# Patient Record
Sex: FEMALE | Race: ASIAN | NOT HISPANIC OR LATINO | Employment: FULL TIME | URBAN - METROPOLITAN AREA
[De-identification: names, ages, dates, MRNs, and addresses within clinical notes are randomized per-mention and may not be internally consistent; named-entity substitution may affect disease eponyms.]

---

## 2017-01-30 ENCOUNTER — HOSPITAL ENCOUNTER (OUTPATIENT)
Dept: LAB | Facility: MEDICAL CENTER | Age: 42
End: 2017-01-30
Attending: FAMILY MEDICINE
Payer: COMMERCIAL

## 2017-01-30 LAB
CHOLEST SERPL-MCNC: 209 MG/DL (ref 100–199)
HDLC SERPL-MCNC: 27 MG/DL
LDLC SERPL CALC-MCNC: ABNORMAL MG/DL
TRIGL SERPL-MCNC: 421 MG/DL (ref 0–149)

## 2017-01-30 PROCEDURE — 36415 COLL VENOUS BLD VENIPUNCTURE: CPT

## 2017-01-30 PROCEDURE — 80061 LIPID PANEL: CPT

## 2017-05-15 ENCOUNTER — HOSPITAL ENCOUNTER (OUTPATIENT)
Dept: LAB | Facility: MEDICAL CENTER | Age: 42
End: 2017-05-15
Attending: FAMILY MEDICINE
Payer: COMMERCIAL

## 2017-05-15 LAB
CHOLEST SERPL-MCNC: 320 MG/DL (ref 100–199)
HDLC SERPL-MCNC: ABNORMAL MG/DL
LDLC SERPL CALC-MCNC: ABNORMAL MG/DL
TRIGL SERPL-MCNC: 1339 MG/DL (ref 0–149)

## 2017-05-15 PROCEDURE — 36415 COLL VENOUS BLD VENIPUNCTURE: CPT

## 2017-05-15 PROCEDURE — 80061 LIPID PANEL: CPT

## 2017-05-18 ENCOUNTER — HOSPITAL ENCOUNTER (OUTPATIENT)
Dept: LAB | Facility: MEDICAL CENTER | Age: 42
End: 2017-05-18
Attending: FAMILY MEDICINE
Payer: COMMERCIAL

## 2017-05-18 LAB
EST. AVERAGE GLUCOSE BLD GHB EST-MCNC: 151 MG/DL
GLUCOSE SERPL-MCNC: 162 MG/DL (ref 65–99)
HBA1C MFR BLD: 6.9 % (ref 0–5.6)
TSH SERPL DL<=0.005 MIU/L-ACNC: 1.51 UIU/ML (ref 0.3–3.7)

## 2017-05-18 PROCEDURE — 82947 ASSAY GLUCOSE BLOOD QUANT: CPT

## 2017-05-18 PROCEDURE — 36415 COLL VENOUS BLD VENIPUNCTURE: CPT

## 2017-05-18 PROCEDURE — 83036 HEMOGLOBIN GLYCOSYLATED A1C: CPT

## 2017-05-18 PROCEDURE — 84443 ASSAY THYROID STIM HORMONE: CPT

## 2017-06-14 ENCOUNTER — OFFICE VISIT (OUTPATIENT)
Dept: URGENT CARE | Facility: PHYSICIAN GROUP | Age: 42
End: 2017-06-14
Payer: COMMERCIAL

## 2017-06-14 VITALS
WEIGHT: 144 LBS | TEMPERATURE: 98.4 F | OXYGEN SATURATION: 96 % | HEART RATE: 89 BPM | SYSTOLIC BLOOD PRESSURE: 122 MMHG | RESPIRATION RATE: 16 BRPM | DIASTOLIC BLOOD PRESSURE: 84 MMHG | BODY MASS INDEX: 23.96 KG/M2

## 2017-06-14 DIAGNOSIS — Z72.0 TOBACCO USE: ICD-10-CM

## 2017-06-14 DIAGNOSIS — J20.9 ACUTE BRONCHITIS, UNSPECIFIED ORGANISM: ICD-10-CM

## 2017-06-14 PROCEDURE — 99214 OFFICE O/P EST MOD 30 MIN: CPT | Performed by: NURSE PRACTITIONER

## 2017-06-14 RX ORDER — AZITHROMYCIN 250 MG/1
TABLET, FILM COATED ORAL
Qty: 6 TAB | Refills: 0 | Status: SHIPPED | OUTPATIENT
Start: 2017-06-14 | End: 2017-11-28

## 2017-06-14 RX ORDER — PROMETHAZINE HYDROCHLORIDE AND CODEINE PHOSPHATE 6.25; 1 MG/5ML; MG/5ML
5-10 SYRUP ORAL
Qty: 120 ML | Refills: 0 | Status: SHIPPED | OUTPATIENT
Start: 2017-06-14 | End: 2017-11-28

## 2017-06-14 ASSESSMENT — ENCOUNTER SYMPTOMS
CHILLS: 1
COUGH: 1
SORE THROAT: 1
MYALGIAS: 1
RHINORRHEA: 1
FEVER: 0

## 2017-06-14 NOTE — Clinical Note
June 14, 2017        Ita ANDRADE Alex  [address] [place] [address]        Ms. Johnson was seen in our clinic today and she is excused from work for today and tomorrow. Thank you.  If you have any questions or concerns, please don't hesitate to call.        Sincerely,        KAI Cartwright.    Electronically Signed

## 2017-06-14 NOTE — MR AVS SNAPSHOT
Ita Johnson   2017 9:15 AM   Office Visit   MRN: [id number]    Department:  [facility]   Dept Phone:  [phone]    Description:  Female : 1975   Provider:  VALENTINE Cartwright           Reason for Visit     Cough x4 days. Cough, fever, body aches, lack of appetite      Allergies as of 2017     No Known Allergies      You were diagnosed with     Acute bronchitis, unspecified organism   [7204249]       Tobacco use   [[id number]]         Vital Signs     Blood Pressure Pulse Temperature Respirations Weight Oxygen Saturation    122/84 mmHg 89 36.9 °C (98.4 °F) 16 65.318 kg (144 lb) 96%    Last Menstrual Period Breastfeeding? Smoking Status             2017 No Current Every Day Smoker         Basic Information     Date Of Birth Sex Race Ethnicity Preferred Language    1975 Female  Non- English      Your appointments     Aug 21, 2017  8:20 AM   New Patient with Raquel Bolanos M.D.   [facility] (--)    [address]  [address]  [facility] [address] [phone]           Please bring Photo ID, Insurance Cards, All Medication Bottles and copies of any legal documents (such as Living Will, Power of ) If speaking a language besides English please bring an adult . Please arrive 30 minutes prior for check in and registration. You will be receiving a confirmation call a few days before your appointment from our automated call confirmation system.              Health Maintenance        Date Due Completion Dates    IMM DTaP/Tdap/Td Vaccine (1 - Tdap) 1995 ---    PAP SMEAR 1997 ---    MAMMOGRAM 2016 ---            Current Immunizations     No immunizations on file.      Below and/or attached are the medications your provider expects you to take. Review all of your home medications and newly ordered medications with your provider and/or pharmacist. Follow medication instructions as directed by your provider and/or  pharmacist. Please keep your medication list with you and share with your provider. Update the information when medications are discontinued, doses are changed, or new medications (including over-the-counter products) are added; and carry medication information at all times in the event of emergency situations     Allergies:  No Known Allergies          Medications  Valid as of: June 14, 2017 -  9:31 AM    Generic Name Brand Name Tablet Size Instructions for use    Azithromycin (Tab) ZITHROMAX 250 MG Take as directed        Azithromycin (Tab) ZITHROMAX 250 MG Take as directed        Guaifenesin-Codeine (Solution) ROBITUSSIN -10 mg/5mL Take 5 mL by mouth every four hours as needed for Cough.        Promethazine-Codeine (Syrup) PHENERGAN-CODEINE 6.25-10 MG/5ML Take 5-10 mL by mouth every bedtime.        .                 Medicines prescribed today were sent to:     [facility] [address] [place] [address], [place] - [address] [facility] [employer]    [address] [place] [address]    Phone: [phone] Fax: [phone]    Open 24 Hours?: No      Medication refill instructions:       If your prescription bottle indicates you have medication refills left, it is not necessary to call your provider’s office. Please contact your pharmacy and they will refill your medication.    If your prescription bottle indicates you do not have any refills left, you may request refills at any time through one of the following ways: The online [employer] system (except Urgent Care), by calling your provider’s office, or by asking your pharmacy to contact your provider’s office with a refill request. Medication refills are processed only during regular business hours and may not be available until the next business day. Your provider may request additional information or to have a follow-up visit with you prior to refilling your medication.   *Please Note: Medication refills are assigned a new Rx number when refilled  electronically. Your pharmacy may indicate that no refills were authorized even though a new prescription for the same medication is available at the pharmacy. Please request the medicine by name with the pharmacy before contacting your provider for a refill.           MyChart Access Code: Activation code not generated  Current MyChart Status: Active          Quit Tobacco Information     Do you want to quit using tobacco?    Quitting tobacco decreases risks of cancer, heart and lung disease, increases life expectancy, improves sense of taste and smell, and increases spending money, among other benefits.    If you are thinking about quitting, we can help.  • [facility] Quit Tobacco Program: [phone]  o Program occurs weekly for four weeks and includes pharmacist consultation on products to support quitting smoking or chewing tobacco. A provider referral is needed for pharmacist consultation.  • Tobacco Users Help Hotline: 4-833-QUIT-NOW (499-7689) or https://[place].quitlogix.org/  o Free, confidential telephone and online coaching for [place] residents. Sessions are designed on a schedule that is convenient for you. Eligible clients receive free nicotine replacement therapy.  • Nationally: www.smokefree.gov  o Information and professional assistance to support both immediate and long-term needs as you become, and remain, a non-smoker. Smokefree.gov allows you to choose the help that best fits your needs.

## 2017-06-14 NOTE — PROGRESS NOTES
Subjective:      Ita Johnson is a 41 y.o. female who presents with Cough            Cough  This is a new problem. The current episode started in the past 7 days. The problem has been unchanged. The cough is non-productive. Associated symptoms include chills, myalgias, nasal congestion, postnasal drip, rhinorrhea and a sore throat. Pertinent negatives include no fever. She has tried OTC cough suppressant for the symptoms. The treatment provided mild relief. There is no history of asthma.   Allergies, medications and history reviewed by me today  Current every day smoker.    Review of Systems   Constitutional: Positive for chills. Negative for fever.   HENT: Positive for postnasal drip, rhinorrhea and sore throat.    Respiratory: Positive for cough.    Musculoskeletal: Positive for myalgias.          Objective:     /84 mmHg  Pulse 89  Temp(Src) 36.9 °C (98.4 °F)  Resp 16  Wt 65.318 kg (144 lb)  SpO2 96%  LMP 05/14/2017  Breastfeeding? No     Physical Exam   Constitutional: She is oriented to person, place, and time. She appears well-developed and well-nourished. No distress.   HENT:   Head: Normocephalic and atraumatic.   Right Ear: External ear and ear canal normal. Tympanic membrane is not injected and not perforated. No middle ear effusion.   Left Ear: External ear and ear canal normal. Tympanic membrane is not injected and not perforated.  No middle ear effusion.   Nose: Mucosal edema present.   Mouth/Throat: Posterior oropharyngeal erythema present. No oropharyngeal exudate.   Eyes: Conjunctivae are normal. Right eye exhibits no discharge. Left eye exhibits no discharge.   Neck: Normal range of motion. Neck supple.   Cardiovascular: Normal rate, regular rhythm and normal heart sounds.    No murmur heard.  Pulmonary/Chest: Effort normal and breath sounds normal. No respiratory distress. She has no wheezes. She has no rales.   Musculoskeletal: Normal range of motion.   Normal movement of all 4  extremities.   Lymphadenopathy:     She has no cervical adenopathy.        Right: No supraclavicular adenopathy present.        Left: No supraclavicular adenopathy present.   Neurological: She is alert and oriented to person, place, and time. Gait normal.   Skin: Skin is warm and dry.   Psychiatric: She has a normal mood and affect. Her behavior is normal. Thought content normal.   Nursing note and vitals reviewed.              Assessment/Plan:     1. Acute bronchitis, unspecified organism  azithromycin (ZITHROMAX) 250 MG Tab    promethazine-codeine (PHENERGAN-CODEINE) 6.25-10 MG/5ML Syrup   2. Tobacco use       Patient is cautioned on sedation potential of narcotic medication; no drinking, driving or operating heavy machinery while on this medication.  [place]  reviewed, no risk factors or red flags.  Differential diagnosis, natural history, supportive care, and indications for immediate follow-up discussed at length.

## 2017-08-18 ENCOUNTER — HOSPITAL ENCOUNTER (OUTPATIENT)
Dept: LAB | Facility: MEDICAL CENTER | Age: 42
End: 2017-08-18
Attending: FAMILY MEDICINE
Payer: COMMERCIAL

## 2017-08-18 LAB
EST. AVERAGE GLUCOSE BLD GHB EST-MCNC: 128 MG/DL
HBA1C MFR BLD: 6.1 % (ref 0–5.6)

## 2017-08-18 PROCEDURE — 36415 COLL VENOUS BLD VENIPUNCTURE: CPT

## 2017-08-18 PROCEDURE — 80061 LIPID PANEL: CPT

## 2017-08-18 PROCEDURE — 83036 HEMOGLOBIN GLYCOSYLATED A1C: CPT

## 2017-08-19 LAB
CHOLEST SERPL-MCNC: 69 MG/DL (ref 100–199)
HDLC SERPL-MCNC: 29 MG/DL
LDLC SERPL CALC-MCNC: 7 MG/DL
TRIGL SERPL-MCNC: 167 MG/DL (ref 0–149)

## 2017-08-21 ENCOUNTER — OFFICE VISIT (OUTPATIENT)
Dept: MEDICAL GROUP | Facility: PHYSICIAN GROUP | Age: 42
End: 2017-08-21
Payer: COMMERCIAL

## 2017-08-21 VITALS
HEART RATE: 80 BPM | SYSTOLIC BLOOD PRESSURE: 126 MMHG | WEIGHT: 144 LBS | HEIGHT: 65 IN | TEMPERATURE: 98.3 F | BODY MASS INDEX: 23.99 KG/M2 | OXYGEN SATURATION: 97 % | RESPIRATION RATE: 16 BRPM | DIASTOLIC BLOOD PRESSURE: 86 MMHG

## 2017-08-21 DIAGNOSIS — E11.9 DIABETES MELLITUS WITHOUT COMPLICATION (HCC): ICD-10-CM

## 2017-08-21 DIAGNOSIS — E55.9 VITAMIN D DEFICIENCY: ICD-10-CM

## 2017-08-21 DIAGNOSIS — F17.200 TOBACCO DEPENDENCE: ICD-10-CM

## 2017-08-21 DIAGNOSIS — E78.5 DYSLIPIDEMIA: ICD-10-CM

## 2017-08-21 DIAGNOSIS — Z23 NEED FOR PNEUMOCOCCAL VACCINATION: ICD-10-CM

## 2017-08-21 DIAGNOSIS — I10 ESSENTIAL HYPERTENSION: ICD-10-CM

## 2017-08-21 PROCEDURE — 90471 IMMUNIZATION ADMIN: CPT | Performed by: FAMILY MEDICINE

## 2017-08-21 PROCEDURE — 99214 OFFICE O/P EST MOD 30 MIN: CPT | Mod: 25 | Performed by: FAMILY MEDICINE

## 2017-08-21 PROCEDURE — 90732 PPSV23 VACC 2 YRS+ SUBQ/IM: CPT | Performed by: FAMILY MEDICINE

## 2017-08-21 RX ORDER — FENOFIBRATE 145 MG/1
145 TABLET, COATED ORAL DAILY
Qty: 30 TAB | Refills: 5 | Status: SHIPPED | OUTPATIENT
Start: 2017-08-21 | End: 2018-02-22 | Stop reason: SDUPTHER

## 2017-08-21 RX ORDER — METFORMIN HYDROCHLORIDE 500 MG/1
500 TABLET, EXTENDED RELEASE ORAL DAILY
Qty: 30 TAB | Refills: 5 | Status: SHIPPED | OUTPATIENT
Start: 2017-08-21 | End: 2018-03-26 | Stop reason: SDUPTHER

## 2017-08-21 RX ORDER — ATORVASTATIN CALCIUM 20 MG/1
20 TABLET, FILM COATED ORAL NIGHTLY
COMMUNITY
End: 2017-11-28

## 2017-08-21 RX ORDER — LOSARTAN POTASSIUM AND HYDROCHLOROTHIAZIDE 12.5; 1 MG/1; MG/1
1 TABLET ORAL DAILY
Qty: 30 TAB | Refills: 5 | Status: SHIPPED | OUTPATIENT
Start: 2017-08-21 | End: 2018-02-22 | Stop reason: SDUPTHER

## 2017-08-21 RX ORDER — LOSARTAN POTASSIUM AND HYDROCHLOROTHIAZIDE 12.5; 1 MG/1; MG/1
1 TABLET ORAL DAILY
COMMUNITY
End: 2017-08-21 | Stop reason: SDUPTHER

## 2017-08-21 RX ORDER — METFORMIN HYDROCHLORIDE 500 MG/1
500 TABLET, EXTENDED RELEASE ORAL DAILY
COMMUNITY
End: 2017-08-21 | Stop reason: SDUPTHER

## 2017-08-21 ASSESSMENT — PATIENT HEALTH QUESTIONNAIRE - PHQ9: CLINICAL INTERPRETATION OF PHQ2 SCORE: 0

## 2017-08-21 ASSESSMENT — PAIN SCALES - GENERAL: PAINLEVEL: NO PAIN

## 2017-08-21 NOTE — MR AVS SNAPSHOT
"Ita Johnson   2017 8:20 AM   Office Visit   MRN: [id number]    Department:  [facility]   Dept Phone:  [phone]    Description:  Female : 1975   Provider:  Raquel Bolanos M.D.           Reason for Visit     Diabetes Found out 3 months ago from Dr. Cantrell, Blood sugar 165 this morning    Establish Care     Results Labs      Allergies as of 2017     No Known Allergies      You were diagnosed with     Diabetes mellitus without complication (CMS-[facility])   [265106]       Dyslipidemia   [583141]       Essential hypertension   [8678757]       Vitamin D deficiency   [5157839]       Need for pneumococcal vaccination   [193096]         Vital Signs     Blood Pressure Pulse Temperature Respirations Height Weight    126/86 mmHg 80 36.8 °C (98.3 °F) 16 1.651 m (5' 5\") 65.318 kg (144 lb)    Body Mass Index Oxygen Saturation Last Menstrual Period Breastfeeding? Smoking Status       23.96 kg/m2 97% 07/10/2017 No Current Every Day Smoker       Basic Information     Date Of Birth Sex Race Ethnicity Preferred Language    1975 Female  Non- English      Your appointments     Sep 25, 2017  9:20 AM   Established Patient with Raquel Bolanos M.D.   [facility] (--)    [address]  [address]  [facility] [id number] [phone]           You will be receiving a confirmation call a few days before your appointment from our automated call confirmation system.            2017  9:20 AM   Established Patient with Raquel Bolanos M.D.   [facility] (--)    [address]  [address]  [facility] [address] [phone]           You will be receiving a confirmation call a few days before your appointment from our automated call confirmation system.              Problem List              ICD-10-CM Priority Class Noted - Resolved    DM type 2 (diabetes mellitus, type 2) (CMS-[facility]) E11.9   Unknown - Present    Dyslipidemia E78.5   Unknown - Present    HTN (hypertension) " I10   Unknown - Present    Vitamin D deficiency E55.9   Unknown - Present      Health Maintenance        Date Due Completion Dates    PAP SMEAR 8/21/1997 ---    MAMMOGRAM 8/21/2016 ---    IMM INFLUENZA (1) 9/1/2017 ---    IMM DTaP/Tdap/Td Vaccine (1 - Tdap) 8/21/2018 (Originally 8/21/1995) ---            Current Immunizations     Pneumococcal polysaccharide vaccine (PPSV-23)  Incomplete      Below and/or attached are the medications your provider expects you to take. Review all of your home medications and newly ordered medications with your provider and/or pharmacist. Follow medication instructions as directed by your provider and/or pharmacist. Please keep your medication list with you and share with your provider. Update the information when medications are discontinued, doses are changed, or new medications (including over-the-counter products) are added; and carry medication information at all times in the event of emergency situations     Allergies:  No Known Allergies          Medications  Valid as of: August 21, 2017 -  9:08 AM    Generic Name Brand Name Tablet Size Instructions for use    Atorvastatin Calcium (Tab) LIPITOR 20 MG Take 20 mg by mouth every evening.        Azithromycin (Tab) ZITHROMAX 250 MG Take as directed        Azithromycin (Tab) ZITHROMAX 250 MG Take as directed        Fenofibrate (Tab) TRICOR 145 MG Take 1 Tab by mouth every day.        Guaifenesin-Codeine (Solution) ROBITUSSIN -10 mg/5mL Take 5 mL by mouth every four hours as needed for Cough.        Losartan Potassium-HCTZ (Tab) HYZAAR 100-12.5 MG Take 1 Tab by mouth every day.        MetFORMIN HCl (TABLET SR 24 HR) GLUCOPHAGE  MG Take 1 Tab by mouth every day.        Promethazine-Codeine (Syrup) PHENERGAN-CODEINE 6.25-10 MG/5ML Take 5-10 mL by mouth every bedtime.        .                 Medicines prescribed today were sent to:     [facility], [place] - [address] [facility]     [address] [place] [address] [address] [place] [address]    Phone: [phone] Fax: [phone]    Open 24 Hours?: No      Medication refill instructions:       If your prescription bottle indicates you have medication refills left, it is not necessary to call your provider’s office. Please contact your pharmacy and they will refill your medication.    If your prescription bottle indicates you do not have any refills left, you may request refills at any time through one of the following ways: The online [employer] system (except Urgent Care), by calling your provider’s office, or by asking your pharmacy to contact your provider’s office with a refill request. Medication refills are processed only during regular business hours and may not be available until the next business day. Your provider may request additional information or to have a follow-up visit with you prior to refilling your medication.   *Please Note: Medication refills are assigned a new Rx number when refilled electronically. Your pharmacy may indicate that no refills were authorized even though a new prescription for the same medication is available at the pharmacy. Please request the medicine by name with the pharmacy before contacting your provider for a refill.        Your To Do List     Future Labs/Procedures Complete By Expires    CBC WITH DIFFERENTIAL  As directed 8/22/2018    COMP METABOLIC PANEL  As directed 8/22/2018    HEMOGLOBIN A1C  As directed 8/22/2018    LIPID PROFILE  As directed 8/22/2018    MICROALBUMIN CREAT RATIO URINE  As directed 8/22/2018    VITAMIN D,25 HYDROXY  As directed 8/22/2018         [employer] Access Code: Activation code not generated  Current [employer] Status: Active          Quit Tobacco Information     Do you want to quit using tobacco?    Quitting tobacco decreases risks of cancer, heart and lung disease, increases life expectancy, improves sense of taste and smell, and increases spending money, among other benefits.    If you are thinking  about quitting, we can help.  • [facility] Quit Tobacco Program: 772-118-1015  o Program occurs weekly for four weeks and includes pharmacist consultation on products to support quitting smoking or chewing tobacco. A provider referral is needed for pharmacist consultation.  • Tobacco Users Help Hotline: 0-800-QUIT-NOW (188-1004) or https://[place].quitlogix.org/  o Free, confidential telephone and online coaching for [place] residents. Sessions are designed on a schedule that is convenient for you. Eligible clients receive free nicotine replacement therapy.  • Nationally: www.smokefree.gov  o Information and professional assistance to support both immediate and long-term needs as you become, and remain, a non-smoker. Smokefree.gov allows you to choose the help that best fits your needs.

## 2017-08-21 NOTE — Clinical Note
[facility]  Raquel Bolanos M.D.  [address]  [place] [address]  Fax: [phone]   Authorization for Release/Disclosure of   Protected Health Information   Name: ITA JOHNSON : 1975 SSN: [id number]   Address: [address] [place] [address] [place] [address] Phone:    [phone] (home)    I authorize the entity listed below to release/disclose the PHI below to:   [facility]/Raquel Bolanos M.D. and Raquel Bolanos M.D.   Provider or Entity Name:    [facility]   Address   City, State, [facility]   Phone:      Fax:     Reason for request: continuity of care   Information to be released:    [  ] LAST COLONOSCOPY,  including any PATH REPORT and follow-up  [  ] LAST FIT/COLOGUARD RESULT [  ] LAST DEXA  [  ] LAST MAMMOGRAM  [  ] LAST PAP  [  ] LAST LABS [x  ] RETINA EXAM REPORT  [  ] IMMUNIZATION RECORDS  [  ] Release all info      [  ] Check here and initial the line next to each item to release ALL health information INCLUDING  _____ Care and treatment for drug and / or alcohol abuse  _____ HIV testing, infection status, or AIDS  _____ Genetic Testing    DATES OF SERVICE OR TIME PERIOD TO BE DISCLOSED: _____________  I understand and acknowledge that:  * This Authorization may be revoked at any time by you in writing, except if your health information has already been used or disclosed.  * Your health information that will be used or disclosed as a result of you signing this authorization could be re-disclosed by the recipient. If this occurs, your re-disclosed health information may no longer be protected by State or Federal laws.  * You may refuse to sign this Authorization. Your refusal will not affect your ability to obtain treatment.  * This Authorization becomes effective upon signing and will  on (date) __________.      If no date is indicated, this Authorization will  one (1) year from the signature date.    Name: Ita Johnson    Signature:   Date:     2017       PLEASE FAX  REQUESTED RECORDS BACK TO: [phone]

## 2017-08-22 NOTE — PROGRESS NOTES
Subjective:      Ita Johnson is a 42 y.o. female who presents with Diabetes; Establish Care; and Results            Diabetes        This is a 42-year-old [place] female who is here as a new patient to get established. She was previously under the care of Dr. Cantrell.    She said she was recently diagnosed with diabetes mellitus type 2 3 months ago and was started on metformin  mg 4 tablets daily which she has been taking regularly. She denies any hypoglycemia. Blood work was done before his visit. She says she has been watchful of her diet. She said she just had a retinal exam at [facility] with normal exam.    She is on atorvastatin 20 mg daily which she takes for dyslipidemia. Follow-up lab work was done before this visit.     She has hypertension for which she takes losartan/HCTZ with good control of her blood pressure.    She has history of vitamin D deficiency which was very low in 2014 and 11. She said she does not take any vitamin D supplementation.    She smokes one pack per day for the last 26 years.       I reviewed the following    Past Medical History   Diagnosis Date   • DM type 2 (diabetes mellitus, type 2) (CMS-HCC)    • Dyslipidemia    • HTN (hypertension)    • Vitamin D deficiency         History reviewed. No pertinent past surgical history.    No Known Allergies    Current Outpatient Prescriptions   Medication Sig Dispense Refill   • atorvastatin (LIPITOR) 20 MG Tab Take 20 mg by mouth every evening.     • fenofibrate (TRICOR) 145 MG Tab Take 1 Tab by mouth every day. 30 Tab 5   • metformin ER (GLUCOPHAGE XR) 500 MG TABLET SR 24 HR Take 1 Tab by mouth every day. 30 Tab 5   • losartan-hydrochlorothiazide (HYZAAR) 100-12.5 MG per tablet Take 1 Tab by mouth every day. 30 Tab 5   • azithromycin (ZITHROMAX) 250 MG Tab Take as directed 6 Tab 0   • promethazine-codeine (PHENERGAN-CODEINE) 6.25-10 MG/5ML Syrup Take 5-10 mL by mouth every bedtime. 120 mL 0   • azithromycin (ZITHROMAX) 250 MG Tab  Take as directed 6 Tab 0   • guaifenesin-codeine (CHERATUSSIN AC) Solution Take 5 mL by mouth every four hours as needed for Cough. 120 mL 0     No current facility-administered medications for this visit.        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   HEENT: Negative for ear pain, nosebleeds, congestion, sore throat and neck pain.    Eyes: Negative for blurred vision.   Respiratory: Negative for cough, sputum production, shortness of breath and wheezing.    Cardiovascular: Negative for chest pain, palpitations, orthopnea and leg swelling.   Gastrointestinal: Negative for heartburn, nausea, vomiting and abdominal pain.   Genitourinary: Negative for dysuria, urgency and frequency.   Musculoskeletal: Negative for myalgias, back pain and joint pain.   Skin: Negative for rash and itching.   Neurological: Negative for dizziness, tingling, tremors, sensory change, focal weakness and  "headaches.   Endo/Heme/Allergies: Does not bruise/bleed easily.   Psychiatric/Behavioral: Negative for depression, anxiety, or memory loss.     All other systems reviewed and are negative except as in HPI.       Objective:     /86 mmHg  Pulse 80  Temp(Src) 36.8 °C (98.3 °F)  Resp 16  Ht 1.651 m (5' 5\")  Wt 65.318 kg (144 lb)  BMI 23.96 kg/m2  SpO2 97%  LMP 07/10/2017  Breastfeeding? No     Physical Exam   Constitutional: She is oriented to person, place, and time. She appears well-developed and well-nourished. No distress.   HENT:   Head: Normocephalic and atraumatic.   Right Ear: External ear normal.   Left Ear: External ear normal.   Nose: Nose normal.   Mouth/Throat: Oropharynx is clear and moist. No oropharyngeal exudate.   Eyes: Conjunctivae and EOM are normal. Pupils are equal, round, and reactive to light. Right eye exhibits no discharge. Left eye exhibits no discharge. No scleral icterus.   Neck: Normal range of motion. Neck supple. No JVD present. No tracheal deviation present. No thyromegaly present.   Cardiovascular: Normal rate, regular rhythm, normal heart sounds and intact distal pulses.  Exam reveals no gallop and no friction rub.    No murmur heard.  Pulmonary/Chest: Effort normal and breath sounds normal. No stridor. No respiratory distress. She has no wheezes. She has no rales. She exhibits no tenderness.   Abdominal: Soft. Bowel sounds are normal. She exhibits no distension and no mass. There is no tenderness. There is no rebound and no guarding. No hernia.   Musculoskeletal: Normal range of motion. She exhibits no edema, tenderness or deformity.   Lymphadenopathy:     She has no cervical adenopathy.   Neurological: She is alert and oriented to person, place, and time. She has normal reflexes. She displays normal reflexes. No cranial nerve deficit. She exhibits normal muscle tone. Coordination normal.   Skin: Skin is warm and dry. No rash noted. She is not diaphoretic. No erythema. " No pallor.   Psychiatric: She has a normal mood and affect. Her behavior is normal. Judgment and thought content normal.   Nursing note and vitals reviewed.     ]Monofilament testing with a 10 gram force: sensation intact: intact bilaterally  Visual Inspection: Feet without maceration, ulcers, fissures.  Pedal pulses: intact bilaterally    Hospital Outpatient Visit on 08/18/2017   Component Date Value   • Cholesterol,Tot 08/18/2017 69*previously 320    • Triglycerides 08/18/2017 167*previously 1339    • HDL 08/18/2017 29*   • LDL 08/18/2017 7    • Glycohemoglobin 08/18/2017 6.1*previously 6.9    • Est Avg Glucose 08/18/2017 128              Assessment/Plan:     1. Diabetes mellitus without complication (CMS-[facility])  To start with her hemoglobin A1c when she was diagnosed with diabetes was 6.9. Hemoglobin A1c dropped to 6.1 on metformin XR total dose of 2000 mg daily. I don't think she needs this much of a dose. I will decrease the dose to 500 mg at night. With her diet, exercise and weight loss I think she can get this diabetes controlled even without the need for medication. I will reevaluate in 3 months. We will include urine micro-albumin with the next blood work.  - LIPID PROFILE; Future  - COMP METABOLIC PANEL; Future  - HEMOGLOBIN A1C; Future  - VITAMIN D,25 HYDROXY; Future  - MICROALBUMIN CREAT RATIO URINE; Future  - CBC WITH DIFFERENTIAL; Future  - metformin ER (GLUCOPHAGE XR) 500 MG TABLET SR 24 HR; Take 1 Tab by mouth every day.  Dispense: 30 Tab; Refill: 5    2. Dyslipidemia  Her total cholesterol dropped to very low level of 69 with LDL very low at 7. Triglycerides significantly improved from 1339 to 167. I will discontinue atorvastatin. I will put her on TriCor to managed dyslipidemia. If the LDL cholesterol increases to 70 or higher then we will restart atorvastatin.  - fenofibrate (TRICOR) 145 MG Tab; Take 1 Tab by mouth every day.  Dispense: 30 Tab; Refill: 5  - LIPID PROFILE; Future  - COMP METABOLIC  PANEL; Future  - HEMOGLOBIN A1C; Future  - VITAMIN D,25 HYDROXY; Future  - MICROALBUMIN CREAT RATIO URINE; Future  - CBC WITH DIFFERENTIAL; Future    3. Essential hypertension  Blood pressure under control and losartan/HCTZ.  - LIPID PROFILE; Future  - COMP METABOLIC PANEL; Future  - HEMOGLOBIN A1C; Future  - VITAMIN D,25 HYDROXY; Future  - MICROALBUMIN CREAT RATIO URINE; Future  - CBC WITH DIFFERENTIAL; Future  - losartan-hydrochlorothiazide (HYZAAR) 100-12.5 MG per tablet; Take 1 Tab by mouth every day.  Dispense: 30 Tab; Refill: 5    4. Vitamin D deficiency  I will check vitamin D level. We will treat depending on the level.  - LIPID PROFILE; Future  - COMP METABOLIC PANEL; Future  - HEMOGLOBIN A1C; Future  - VITAMIN D,25 HYDROXY; Future  - MICROALBUMIN CREAT RATIO URINE; Future  - CBC WITH DIFFERENTIAL; Future    5. Need for pneumococcal vaccination  We gave her Pneumovax this visit. She will need flu shot in the fall.  - PNEUMOCOCCAL POLYSACCHARIDE VACCINE 23-VALENT =>1YO SQ/IM    6. Tobacco dependence  Discussed complete cessation of cigarettes. Discussed her increased risk for heart disease with her multiple risk factors including diabetes, hypertension, dyslipidemia and cigarette smoking. She will work hard in quitting cold turkey. I will reevaluate next visit.      Please note that this dictation was created using voice recognition software. I have worked with consultants from the vendor as well as technical experts from [employer] to optimize the interface. I have made every reasonable attempt to correct obvious errors, but I expect that there are errors of grammar and possibly content I did not discover before finalizing the note.

## 2017-09-25 ENCOUNTER — HOSPITAL ENCOUNTER (OUTPATIENT)
Facility: MEDICAL CENTER | Age: 42
End: 2017-09-25
Attending: FAMILY MEDICINE
Payer: COMMERCIAL

## 2017-09-25 ENCOUNTER — OFFICE VISIT (OUTPATIENT)
Dept: MEDICAL GROUP | Facility: PHYSICIAN GROUP | Age: 42
End: 2017-09-25
Payer: COMMERCIAL

## 2017-09-25 VITALS
WEIGHT: 145.5 LBS | DIASTOLIC BLOOD PRESSURE: 90 MMHG | RESPIRATION RATE: 16 BRPM | HEART RATE: 91 BPM | OXYGEN SATURATION: 100 % | TEMPERATURE: 98.4 F | BODY MASS INDEX: 24.24 KG/M2 | SYSTOLIC BLOOD PRESSURE: 120 MMHG | HEIGHT: 65 IN

## 2017-09-25 DIAGNOSIS — Z01.419 ENCOUNTER FOR ROUTINE GYNECOLOGICAL EXAMINATION WITH PAPANICOLAOU SMEAR OF CERVIX: ICD-10-CM

## 2017-09-25 DIAGNOSIS — Z11.51 SCREENING FOR HUMAN PAPILLOMAVIRUS (HPV): ICD-10-CM

## 2017-09-25 DIAGNOSIS — Z23 NEED FOR IMMUNIZATION AGAINST INFLUENZA: ICD-10-CM

## 2017-09-25 PROCEDURE — 88175 CYTOPATH C/V AUTO FLUID REDO: CPT

## 2017-09-25 PROCEDURE — 99396 PREV VISIT EST AGE 40-64: CPT | Mod: 25 | Performed by: FAMILY MEDICINE

## 2017-09-25 PROCEDURE — 90686 IIV4 VACC NO PRSV 0.5 ML IM: CPT | Performed by: FAMILY MEDICINE

## 2017-09-25 PROCEDURE — 87624 HPV HI-RISK TYP POOLED RSLT: CPT

## 2017-09-25 PROCEDURE — 99000 SPECIMEN HANDLING OFFICE-LAB: CPT | Performed by: FAMILY MEDICINE

## 2017-09-25 PROCEDURE — 90471 IMMUNIZATION ADMIN: CPT | Performed by: FAMILY MEDICINE

## 2017-09-25 NOTE — LETTER
[facility]  Raquel Bolanos M.D.  [address]  [place] [address]  Fax: [phone]   Authorization for Release/Disclosure of   Protected Health Information   Name: ITACOMPA JOHNSON : 1975 SSN: [id number]   Address: [address] [place] [address] [place] [address] Phone:    [phone] (home)    I authorize the entity listed below to release/disclose the PHI below to:   [facility]/Raquel Bolanos M.D. and Raquel Bolanos M.D.   Provider or Entity Name:  [facility]   Address   [facility], [facility], [facility]   Phone:  [phone]    Fax:  [phone]   Reason for request: continuity of care   Information to be released:    [  ] LAST COLONOSCOPY,  including any PATH REPORT and follow-up  [  ] LAST FIT/COLOGUARD RESULT [  ] LAST DEXA  [ x ] LAST MAMMOGRAM  [  ] LAST PAP  [  ] LAST LABS [  ] RETINA EXAM REPORT  [  ] IMMUNIZATION RECORDS  [  ] Release all info      [  ] Check here and initial the line next to each item to release ALL health information INCLUDING  _____ Care and treatment for drug and / or alcohol abuse  _____ HIV testing, infection status, or AIDS  _____ Genetic Testing    DATES OF SERVICE OR TIME PERIOD TO BE DISCLOSED: _____________  I understand and acknowledge that:  * This Authorization may be revoked at any time by you in writing, except if your health information has already been used or disclosed.  * Your health information that will be used or disclosed as a result of you signing this authorization could be re-disclosed by the recipient. If this occurs, your re-disclosed health information may no longer be protected by State or Federal laws.  * You may refuse to sign this Authorization. Your refusal will not affect your ability to obtain treatment.  * This Authorization becomes effective upon signing and will  on (date) __________.      If no date is indicated, this Authorization will  one (1) year from the signature date.    Name: Itacompa Johnson    Signature:   Date:     2017       PLEASE  FAX REQUESTED RECORDS BACK TO: [phone]

## 2017-09-26 LAB
CYTOLOGY REG CYTOL: NORMAL
HPV HR 12 DNA CVX QL NAA+PROBE: NEGATIVE
HPV16 DNA SPEC QL NAA+PROBE: NEGATIVE
HPV18 DNA SPEC QL NAA+PROBE: NEGATIVE
SPECIMEN SOURCE: NORMAL

## 2017-09-26 NOTE — PROGRESS NOTES
Subjective:      Ita Johnson is a 42 y.o. female who presents with Gynecologic Exam            HPI     Patient is here for routine GYN exam/Pap smear. Her last Pap smear was 2 years ago done by previous physicians Dr. Cantrell. No history of abnormal Pap smear. No history of STDs. LMP 8/29/17. She had Pneumovax in 8/17. She just had a mammogram this month at [facility]. She got the letter that says it was normal. We don't have the official report.    I reviewed the following    Past Medical History:   Diagnosis Date   • DM type 2 (diabetes mellitus, type 2) (CMS-[facility])    • Dyslipidemia    • HTN (hypertension)    • Vitamin D deficiency         No past surgical history on file.    No Known Allergies    Current Outpatient Prescriptions   Medication Sig Dispense Refill   • fenofibrate (TRICOR) 145 MG Tab Take 1 Tab by mouth every day. 30 Tab 5   • metformin ER (GLUCOPHAGE XR) 500 MG TABLET SR 24 HR Take 1 Tab by mouth every day. 30 Tab 5   • losartan-hydrochlorothiazide (HYZAAR) 100-12.5 MG per tablet Take 1 Tab by mouth every day. 30 Tab 5   • atorvastatin (LIPITOR) 20 MG Tab Take 20 mg by mouth every evening.     • azithromycin (ZITHROMAX) 250 MG Tab Take as directed 6 Tab 0   • promethazine-codeine (PHENERGAN-CODEINE) 6.25-10 MG/5ML Syrup Take 5-10 mL by mouth every bedtime. 120 mL 0   • azithromycin (ZITHROMAX) 250 MG Tab Take as directed 6 Tab 0   • guaifenesin-codeine (CHERATUSSIN AC) Solution Take 5 mL by mouth every four hours as needed for Cough. 120 mL 0     No current facility-administered medications for this visit.         Family History   Problem Relation Age of Onset   • Stroke Mother    • Hypertension Mother    • Diabetes Father    • Hypertension Father    • Prostate cancer Father      with mets   • Hyperlipidemia Father    • Heart Failure Father    • Hypertension Sister    • Diabetes Sister    • Hyperlipidemia Sister    • Arthritis Sister      autoimmune   • Diabetes Brother    • Hypertension  "Brother        Social History     Social History   • Marital status:      Spouse name: N/A   • Number of children: 0   • Years of education: N/A     Occupational History   • Executive Host - [employer]       Social History Main Topics   • Smoking status: Current Every Day Smoker     Packs/day: 1.00     Years: 26.00     Types: Cigarettes   • Smokeless tobacco: Never Used   • Alcohol use 0.0 oz/week      Comment: occasional   • Drug use: No   • Sexual activity: Yes     Partners: Male     Other Topics Concern   • Not on file     Social History Narrative   • No narrative on file            ROS     Review of systems as per history of present illness, the rest are negative.       Objective:     /90   Pulse 91   Temp 36.9 °C (98.4 °F)   Resp 16   Ht 1.651 m (5' 5\")   Wt 66 kg (145 lb 8.1 oz)   LMP 09/03/2017   SpO2 100%   BMI 24.21 kg/m²      Physical Exam   Constitutional: She is oriented to person, place, and time. She appears well-developed and well-nourished. No distress.   HENT:   Head: Normocephalic and atraumatic.   Right Ear: External ear normal.   Left Ear: External ear normal.   Nose: Nose normal.   Mouth/Throat: Oropharynx is clear and moist. No oropharyngeal exudate.   Eyes: Conjunctivae and EOM are normal. Pupils are equal, round, and reactive to light. Right eye exhibits no discharge. Left eye exhibits no discharge. No scleral icterus.   Neck: Normal range of motion. Neck supple. No tracheal deviation present. No thyromegaly present.   Cardiovascular: Normal rate, regular rhythm and normal heart sounds.  Exam reveals no gallop.    No murmur heard.  Pulmonary/Chest: Effort normal and breath sounds normal. No stridor. No respiratory distress. She has no wheezes. She has no rales. Right breast exhibits no inverted nipple, no mass, no nipple discharge, no skin change and no tenderness. Left breast exhibits no inverted nipple, no mass, no nipple discharge, no skin change and no tenderness. " Breasts are symmetrical.   Abdominal: Soft. Bowel sounds are normal. She exhibits no distension and no mass. There is no tenderness. There is no rebound and no guarding. Hernia confirmed negative in the right inguinal area and confirmed negative in the left inguinal area.   Genitourinary: Vagina normal and uterus normal. No breast tenderness, discharge or bleeding. Pelvic exam was performed with patient supine. No labial fusion. There is no rash, tenderness, lesion or injury on the right labia. There is no rash, tenderness, lesion or injury on the left labia. Uterus is not deviated, not enlarged, not fixed and not tender. Cervix exhibits no motion tenderness, no discharge and no friability. Right adnexum displays no mass, no tenderness and no fullness. Left adnexum displays no mass, no tenderness and no fullness. No erythema, tenderness or bleeding in the vagina. No foreign body in the vagina. No signs of injury around the vagina. No vaginal discharge found.   Musculoskeletal: Normal range of motion. She exhibits no edema or tenderness.   Lymphadenopathy:     She has no cervical adenopathy.        Right: No inguinal adenopathy present.        Left: No inguinal adenopathy present.   Neurological: She is alert and oriented to person, place, and time. She displays normal reflexes. No cranial nerve deficit. She exhibits normal muscle tone. Coordination normal.   Skin: Skin is warm. No rash noted. She is not diaphoretic. No erythema. No pallor.   Psychiatric: She has a normal mood and affect. Her behavior is normal. Thought content normal.                    Assessment/Plan:     1. Encounter for routine gynecological examination with Papanicolaou smear of cervix  Pap smear was done. Complete physical exam was done. Specimen was packaged and sent to the lab. We will get official report of the mammogram that was done at [facility].  - THINPREP PAP WITH HPV; Future    2. Screening for human papillomavirus  (HPV)  Screening for HPV was done.  - THINPREP PAP WITH HPV; Future    3. Need for immunization against influenza  Flu shot was given.  - Flu Quad Inj >3 Year Pre-Filled PF    She will return for follow-up visit in November.      Please note that this dictation was created using voice recognition software. I have worked with consultants from the vendor as well as technical experts from [facility] to optimize the interface. I have made every reasonable attempt to correct obvious errors, but I expect that there are errors of grammar and possibly content I did not discover before finalizing the note.

## 2017-11-20 ENCOUNTER — HOSPITAL ENCOUNTER (OUTPATIENT)
Dept: LAB | Facility: MEDICAL CENTER | Age: 42
End: 2017-11-20
Attending: FAMILY MEDICINE
Payer: COMMERCIAL

## 2017-11-20 DIAGNOSIS — E55.9 VITAMIN D DEFICIENCY: ICD-10-CM

## 2017-11-20 DIAGNOSIS — E11.9 DIABETES MELLITUS WITHOUT COMPLICATION (HCC): ICD-10-CM

## 2017-11-20 DIAGNOSIS — I10 ESSENTIAL HYPERTENSION: ICD-10-CM

## 2017-11-20 DIAGNOSIS — E78.5 DYSLIPIDEMIA: ICD-10-CM

## 2017-11-20 LAB
25(OH)D3 SERPL-MCNC: 21 NG/ML (ref 30–100)
ALBUMIN SERPL BCP-MCNC: 4.1 G/DL (ref 3.2–4.9)
ALBUMIN/GLOB SERPL: 1.2 G/DL
ALP SERPL-CCNC: 38 U/L (ref 30–99)
ALT SERPL-CCNC: 24 U/L (ref 2–50)
ANION GAP SERPL CALC-SCNC: 10 MMOL/L (ref 0–11.9)
AST SERPL-CCNC: 21 U/L (ref 12–45)
BASOPHILS # BLD AUTO: 0.5 % (ref 0–1.8)
BASOPHILS # BLD: 0.04 K/UL (ref 0–0.12)
BILIRUB SERPL-MCNC: 0.3 MG/DL (ref 0.1–1.5)
BUN SERPL-MCNC: 20 MG/DL (ref 8–22)
CALCIUM SERPL-MCNC: 9.1 MG/DL (ref 8.5–10.5)
CHLORIDE SERPL-SCNC: 101 MMOL/L (ref 96–112)
CHOLEST SERPL-MCNC: 132 MG/DL (ref 100–199)
CO2 SERPL-SCNC: 25 MMOL/L (ref 20–33)
CREAT SERPL-MCNC: 0.82 MG/DL (ref 0.5–1.4)
CREAT UR-MCNC: 145.4 MG/DL
EOSINOPHIL # BLD AUTO: 0.28 K/UL (ref 0–0.51)
EOSINOPHIL NFR BLD: 3.7 % (ref 0–6.9)
ERYTHROCYTE [DISTWIDTH] IN BLOOD BY AUTOMATED COUNT: 48.8 FL (ref 35.9–50)
EST. AVERAGE GLUCOSE BLD GHB EST-MCNC: 137 MG/DL
GFR SERPL CREATININE-BSD FRML MDRD: >60 ML/MIN/1.73 M 2
GLOBULIN SER CALC-MCNC: 3.5 G/DL (ref 1.9–3.5)
GLUCOSE SERPL-MCNC: 107 MG/DL (ref 65–99)
HBA1C MFR BLD: 6.4 % (ref 0–5.6)
HCT VFR BLD AUTO: 44.5 % (ref 37–47)
HDLC SERPL-MCNC: 24 MG/DL
HGB BLD-MCNC: 14.4 G/DL (ref 12–16)
IMM GRANULOCYTES # BLD AUTO: 0.03 K/UL (ref 0–0.11)
IMM GRANULOCYTES NFR BLD AUTO: 0.4 % (ref 0–0.9)
LDLC SERPL CALC-MCNC: 52 MG/DL
LYMPHOCYTES # BLD AUTO: 3.85 K/UL (ref 1–4.8)
LYMPHOCYTES NFR BLD: 50.5 % (ref 22–41)
MCH RBC QN AUTO: 29.3 PG (ref 27–33)
MCHC RBC AUTO-ENTMCNC: 32.4 G/DL (ref 33.6–35)
MCV RBC AUTO: 90.4 FL (ref 81.4–97.8)
MICROALBUMIN UR-MCNC: 0.8 MG/DL
MICROALBUMIN/CREAT UR: 6 MG/G (ref 0–30)
MONOCYTES # BLD AUTO: 0.53 K/UL (ref 0–0.85)
MONOCYTES NFR BLD AUTO: 7 % (ref 0–13.4)
NEUTROPHILS # BLD AUTO: 2.89 K/UL (ref 2–7.15)
NEUTROPHILS NFR BLD: 37.9 % (ref 44–72)
NRBC # BLD AUTO: 0 K/UL
NRBC BLD AUTO-RTO: 0 /100 WBC
PLATELET # BLD AUTO: 417 K/UL (ref 164–446)
PMV BLD AUTO: 8.9 FL (ref 9–12.9)
POTASSIUM SERPL-SCNC: 3.6 MMOL/L (ref 3.6–5.5)
PROT SERPL-MCNC: 7.6 G/DL (ref 6–8.2)
RBC # BLD AUTO: 4.92 M/UL (ref 4.2–5.4)
SODIUM SERPL-SCNC: 136 MMOL/L (ref 135–145)
TRIGL SERPL-MCNC: 281 MG/DL (ref 0–149)
WBC # BLD AUTO: 7.6 K/UL (ref 4.8–10.8)

## 2017-11-20 PROCEDURE — 82043 UR ALBUMIN QUANTITATIVE: CPT

## 2017-11-20 PROCEDURE — 85025 COMPLETE CBC W/AUTO DIFF WBC: CPT

## 2017-11-20 PROCEDURE — 82306 VITAMIN D 25 HYDROXY: CPT

## 2017-11-20 PROCEDURE — 83036 HEMOGLOBIN GLYCOSYLATED A1C: CPT

## 2017-11-20 PROCEDURE — 80053 COMPREHEN METABOLIC PANEL: CPT

## 2017-11-20 PROCEDURE — 82570 ASSAY OF URINE CREATININE: CPT

## 2017-11-20 PROCEDURE — 36415 COLL VENOUS BLD VENIPUNCTURE: CPT

## 2017-11-20 PROCEDURE — 80061 LIPID PANEL: CPT

## 2017-11-27 ENCOUNTER — OFFICE VISIT (OUTPATIENT)
Dept: MEDICAL GROUP | Facility: PHYSICIAN GROUP | Age: 42
End: 2017-11-27
Payer: COMMERCIAL

## 2017-11-27 VITALS
HEART RATE: 79 BPM | OXYGEN SATURATION: 98 % | DIASTOLIC BLOOD PRESSURE: 80 MMHG | TEMPERATURE: 98.1 F | WEIGHT: 150.13 LBS | BODY MASS INDEX: 25.01 KG/M2 | HEIGHT: 65 IN | SYSTOLIC BLOOD PRESSURE: 130 MMHG

## 2017-11-27 DIAGNOSIS — E55.9 VITAMIN D DEFICIENCY: ICD-10-CM

## 2017-11-27 DIAGNOSIS — E78.5 DYSLIPIDEMIA: ICD-10-CM

## 2017-11-27 DIAGNOSIS — E11.9 DIABETES MELLITUS WITHOUT COMPLICATION (HCC): ICD-10-CM

## 2017-11-27 DIAGNOSIS — I10 ESSENTIAL HYPERTENSION: ICD-10-CM

## 2017-11-27 PROCEDURE — 99214 OFFICE O/P EST MOD 30 MIN: CPT | Performed by: FAMILY MEDICINE

## 2017-11-28 NOTE — PROGRESS NOTES
"Subjective:      Ita Mays is a 42 y.o. female who presents with Follow-Up (arms numbness and ibis)            HPI     Patient returns for follow-up of her medical problems.    In terms of her diabetes mellitus type 2 she is taking metformin  mg one tablet daily only with dinner. Blood work was done before this visit.    For her dyslipidemia, we discontinued atorvastatin because her LDL cholesterol was already very low at single digit at 7. She is only on fenofibrate. Blood work was done before his visit.    She continues to take losartan/hydrochlorothiazide for hypertension with good control of her blood pressure.    For her vitamin D deficiency, she takes vitamin D supplementation 1000 international units daily.    Past medical history, past surgical history, family history reviewed-no changes    Social history reviewed-no changes    Allergies reviewed-no changes    Medications reviewed-no changes    ROS     Review of systems as per history of present illness, the rest are negative.       Objective:     /80   Pulse 79   Temp 36.7 °C (98.1 °F)   Ht 1.651 m (5' 5\")   Wt 68.1 kg (150 lb 2.1 oz)   SpO2 98%   BMI 24.98 kg/m²      Physical Exam      Examined alert, awake, oriented, not in distress    Neck-supple, no lymphadenopathy, no thyromegaly  Lungs-clear to auscultation, no rales, no wheezes  Heart-regular rate and rhythm, no murmur  Extremities-no edema, clubbing, cyanosis  Monofilament testing with a 10 gram force: sensation intact: intact bilaterally  Visual Inspection: Feet without maceration, ulcers, fissures.  Pedal pulses: intact bilaterally        Results for ITA MAYS (MRN [id number]) as of 11/28/2017 06:38   Ref. Range 8/18/2017 07:07 11/20/2017 07:27 11/20/2017 07:28   Sodium Latest Ref Range: 135 - 145 mmol/L   136   Potassium Latest Ref Range: 3.6 - 5.5 mmol/L   3.6   Chloride Latest Ref Range: 96 - 112 mmol/L   101   Co2 Latest Ref Range: 20 - 33 mmol/L   25   Anion Gap " Latest Ref Range: 0.0 - 11.9    10.0   Glucose Latest Ref Range: 65 - 99 mg/dL   107 (H)   Bun Latest Ref Range: 8 - 22 mg/dL   20   Creatinine Latest Ref Range: 0.50 - 1.40 mg/dL   0.82   GFR If  Latest Ref Range: >60 mL/min/1.73 m 2   >60   GFR If Non  Latest Ref Range: >60 mL/min/1.73 m 2   >60   Calcium Latest Ref Range: 8.5 - 10.5 mg/dL   9.1   AST(SGOT) Latest Ref Range: 12 - 45 U/L   21   ALT(SGPT) Latest Ref Range: 2 - 50 U/L   24   Alkaline Phosphatase Latest Ref Range: 30 - 99 U/L   38   Total Bilirubin Latest Ref Range: 0.1 - 1.5 mg/dL   0.3   Albumin Latest Ref Range: 3.2 - 4.9 g/dL   4.1   Total Protein Latest Ref Range: 6.0 - 8.2 g/dL   7.6   Globulin Latest Ref Range: 1.9 - 3.5 g/dL   3.5   A-G Ratio Latest Units: g/dL   1.2   Glycohemoglobin Latest Ref Range: 0.0 - 5.6 % 6.1 (H)  6.4 (H)   Estim. Avg Glu Latest Units: mg/dL 128  137   Cholesterol,Tot Latest Ref Range: 100 - 199 mg/dL 69 (L)  132   Triglycerides Latest Ref Range: 0 - 149 mg/dL 167 (H)  281 (H)   HDL Latest Ref Range: >=40 mg/dL 29 (A)  24 (A)   LDL Latest Ref Range: <100 mg/dL 7  52   Micro Alb Creat Ratio Latest Ref Range: 0 - 30 mg/g  6    Creatinine, Urine Latest Units: mg/dL  145.40    Microalbumin, Urine Random Latest Units: mg/dL  0.8    Results for MICHAEL MAYS (MRN [id number]) as of 11/28/2017 06:38   Ref. Range 7/20/2016 06:55 11/20/2017 07:28   WBC Latest Ref Range: 4.8 - 10.8 K/uL 8.2 7.6   RBC Latest Ref Range: 4.20 - 5.40 M/uL 5.15 4.92   Hemoglobin Latest Ref Range: 12.0 - 16.0 g/dL 15.1 14.4   Hematocrit Latest Ref Range: 37.0 - 47.0 % 46.8 44.5   MCV Latest Ref Range: 81.4 - 97.8 fL 90.9 90.4   MCH Latest Ref Range: 27.0 - 33.0 pg 29.3 29.3   MCHC Latest Ref Range: 33.6 - 35.0 g/dL 32.3 (L) 32.4 (L)   RDW Latest Ref Range: 35.9 - 50.0 fL 45.9 48.8   Platelet Count Latest Ref Range: 164 - 446 K/uL 327 417   MPV Latest Ref Range: 9.0 - 12.9 fL 9.4 8.9 (L)   Neutrophils-Polys Latest Ref  Range: 44.00 - 72.00 % 48.90 37.90 (L)   Neutrophils (Absolute) Latest Ref Range: 2.00 - 7.15 K/uL 4.02 2.89   Lymphocytes Latest Ref Range: 22.00 - 41.00 % 39.20 50.50 (H)   Lymphs (Absolute) Latest Ref Range: 1.00 - 4.80 K/uL 3.23 3.85   Monocytes Latest Ref Range: 0.00 - 13.40 % 7.90 7.00   Monos (Absolute) Latest Ref Range: 0.00 - 0.85 K/uL 0.65 0.53   Eosinophils Latest Ref Range: 0.00 - 6.90 % 3.00 3.70   Eos (Absolute) Latest Ref Range: 0.00 - 0.51 K/uL 0.25 0.28   Basophils Latest Ref Range: 0.00 - 1.80 % 0.60 0.50   Baso (Absolute) Latest Ref Range: 0.00 - 0.12 K/uL 0.05 0.04   Immature Granulocytes Latest Ref Range: 0.00 - 0.90 % 0.40 0.40   Immature Granulocytes (abs) Latest Ref Range: 0.00 - 0.11 K/uL 0.03 0.03   Nucleated RBC Latest Units: /100 WBC 0.00 0.00   NRBC (Absolute) Latest Units: K/uL 0.00 0.00   Results for MICHAEL MAYS (MRN [id number]) as of 11/28/2017 06:38   Ref. Range 11/20/2017 07:28   25-Hydroxy   Vitamin D 25 Latest Ref Range: 30 - 100 ng/mL 21 (L)     Assessment/Plan:     1. Diabetes mellitus without complication (CMS-[facility])  This is well controlled on low-dose metformin.  - Diabetic Monofilament Lower Extremity Exam  - LIPID PROFILE; Future  - COMP METABOLIC PANEL; Future  - HEMOGLOBIN A1C; Future  - VITAMIN D,25 HYDROXY; Future    2. Dyslipidemia  LDL is not very low anymore without the atorvastatin. Triglycerides increased from before. Advised to watch the carbohydrates and sweets. She still has low HDL and she needs to increase her activity with cardio exercises to make this better. Continue fenofibrate.  - LIPID PROFILE; Future  - COMP METABOLIC PANEL; Future  - HEMOGLOBIN A1C; Future  - VITAMIN D,25 HYDROXY; Future    3. Essential hypertension  Controlled on her medication.  - LIPID PROFILE; Future  - COMP METABOLIC PANEL; Future  - HEMOGLOBIN A1C; Future  - VITAMIN D,25 HYDROXY; Future    4. Vitamin D deficiency  Not adequately replaced. Increase vitamin D supplementation to 2000  international units daily. Recheck vitamin D level next visit.  - LIPID PROFILE; Future  - COMP METABOLIC PANEL; Future  - HEMOGLOBIN A1C; Future  - VITAMIN D,25 HYDROXY; Future      Please note that this dictation was created using voice recognition software. I have worked with consultants from the vendor as well as technical experts from [facility]  [employer] to optimize the interface. I have made every reasonable attempt to correct obvious errors, but I expect that there are errors of grammar and possibly content I did not discover before finalizing the note.

## 2018-02-22 DIAGNOSIS — I10 ESSENTIAL HYPERTENSION: ICD-10-CM

## 2018-02-22 DIAGNOSIS — E78.5 DYSLIPIDEMIA: ICD-10-CM

## 2018-02-22 RX ORDER — FENOFIBRATE 145 MG/1
TABLET, COATED ORAL
Qty: 30 TAB | Refills: 5 | Status: SHIPPED | OUTPATIENT
Start: 2018-02-22 | End: 2018-08-13 | Stop reason: SDUPTHER

## 2018-02-22 RX ORDER — LOSARTAN POTASSIUM AND HYDROCHLOROTHIAZIDE 12.5; 1 MG/1; MG/1
TABLET ORAL
Qty: 30 TAB | Refills: 5 | Status: SHIPPED | OUTPATIENT
Start: 2018-02-22 | End: 2018-08-13 | Stop reason: SDUPTHER

## 2018-03-19 ENCOUNTER — APPOINTMENT (OUTPATIENT)
Dept: MEDICAL GROUP | Facility: PHYSICIAN GROUP | Age: 43
End: 2018-03-19
Payer: COMMERCIAL

## 2018-03-26 DIAGNOSIS — E11.9 DIABETES MELLITUS WITHOUT COMPLICATION (HCC): ICD-10-CM

## 2018-03-26 RX ORDER — METFORMIN HYDROCHLORIDE 500 MG/1
TABLET, EXTENDED RELEASE ORAL
Qty: 30 TAB | Refills: 11 | Status: SHIPPED | OUTPATIENT
Start: 2018-03-26 | End: 2019-03-25 | Stop reason: SDUPTHER

## 2018-04-09 ENCOUNTER — HOSPITAL ENCOUNTER (OUTPATIENT)
Dept: LAB | Facility: MEDICAL CENTER | Age: 43
End: 2018-04-09
Attending: FAMILY MEDICINE
Payer: COMMERCIAL

## 2018-04-09 DIAGNOSIS — E78.5 DYSLIPIDEMIA: ICD-10-CM

## 2018-04-09 DIAGNOSIS — E55.9 VITAMIN D DEFICIENCY: ICD-10-CM

## 2018-04-09 DIAGNOSIS — E11.9 DIABETES MELLITUS WITHOUT COMPLICATION (HCC): ICD-10-CM

## 2018-04-09 DIAGNOSIS — I10 ESSENTIAL HYPERTENSION: ICD-10-CM

## 2018-04-09 LAB
ALBUMIN SERPL BCP-MCNC: 4.3 G/DL (ref 3.2–4.9)
ALBUMIN/GLOB SERPL: 1.2 G/DL
ALP SERPL-CCNC: 42 U/L (ref 30–99)
ALT SERPL-CCNC: 25 U/L (ref 2–50)
ANION GAP SERPL CALC-SCNC: 6 MMOL/L (ref 0–11.9)
AST SERPL-CCNC: 18 U/L (ref 12–45)
BILIRUB SERPL-MCNC: 0.4 MG/DL (ref 0.1–1.5)
BUN SERPL-MCNC: 17 MG/DL (ref 8–22)
CALCIUM SERPL-MCNC: 9.2 MG/DL (ref 8.5–10.5)
CHLORIDE SERPL-SCNC: 103 MMOL/L (ref 96–112)
CHOLEST SERPL-MCNC: 134 MG/DL (ref 100–199)
CO2 SERPL-SCNC: 25 MMOL/L (ref 20–33)
CREAT SERPL-MCNC: 0.74 MG/DL (ref 0.5–1.4)
EST. AVERAGE GLUCOSE BLD GHB EST-MCNC: 137 MG/DL
GLOBULIN SER CALC-MCNC: 3.6 G/DL (ref 1.9–3.5)
GLUCOSE SERPL-MCNC: 135 MG/DL (ref 65–99)
HBA1C MFR BLD: 6.4 % (ref 0–5.6)
HDLC SERPL-MCNC: 26 MG/DL
LDLC SERPL CALC-MCNC: 50 MG/DL
POTASSIUM SERPL-SCNC: 3.4 MMOL/L (ref 3.6–5.5)
PROT SERPL-MCNC: 7.9 G/DL (ref 6–8.2)
SODIUM SERPL-SCNC: 134 MMOL/L (ref 135–145)
TRIGL SERPL-MCNC: 288 MG/DL (ref 0–149)

## 2018-04-09 PROCEDURE — 82306 VITAMIN D 25 HYDROXY: CPT

## 2018-04-09 PROCEDURE — 83036 HEMOGLOBIN GLYCOSYLATED A1C: CPT

## 2018-04-09 PROCEDURE — 80053 COMPREHEN METABOLIC PANEL: CPT

## 2018-04-09 PROCEDURE — 36415 COLL VENOUS BLD VENIPUNCTURE: CPT

## 2018-04-09 PROCEDURE — 80061 LIPID PANEL: CPT

## 2018-04-10 LAB — 25(OH)D3 SERPL-MCNC: 63 NG/ML (ref 30–100)

## 2018-04-11 ENCOUNTER — OFFICE VISIT (OUTPATIENT)
Dept: MEDICAL GROUP | Facility: PHYSICIAN GROUP | Age: 43
End: 2018-04-11
Payer: COMMERCIAL

## 2018-04-11 VITALS
HEART RATE: 92 BPM | HEIGHT: 65 IN | WEIGHT: 154.1 LBS | OXYGEN SATURATION: 95 % | SYSTOLIC BLOOD PRESSURE: 130 MMHG | BODY MASS INDEX: 25.67 KG/M2 | DIASTOLIC BLOOD PRESSURE: 90 MMHG | TEMPERATURE: 98 F

## 2018-04-11 DIAGNOSIS — E11.9 DIABETES MELLITUS WITHOUT COMPLICATION (HCC): ICD-10-CM

## 2018-04-11 DIAGNOSIS — E55.9 VITAMIN D DEFICIENCY: ICD-10-CM

## 2018-04-11 DIAGNOSIS — E78.5 DYSLIPIDEMIA: ICD-10-CM

## 2018-04-11 DIAGNOSIS — I10 ESSENTIAL HYPERTENSION: ICD-10-CM

## 2018-04-11 DIAGNOSIS — R05.3 CHRONIC COUGH: ICD-10-CM

## 2018-04-11 PROCEDURE — 99214 OFFICE O/P EST MOD 30 MIN: CPT | Performed by: FAMILY MEDICINE

## 2018-04-11 RX ORDER — AZITHROMYCIN 250 MG/1
TABLET, FILM COATED ORAL
Qty: 6 TAB | Refills: 0 | Status: SHIPPED | OUTPATIENT
Start: 2018-04-11 | End: 2018-08-14

## 2018-04-12 NOTE — PROGRESS NOTES
"Subjective:      Ita Johnson is a 42 y.o. female who presents with Diabetes (Follow up) and Medication Refill (fenofibrate, metformin, hyzaar)            HPI     Patient is here for follow-up of her medical problems.    In terms of her diabetes mellitus type 2, she continues to take metformin xr 500 mg one tablet daily only. Babies has been controlled on low dose metformin. Blood work was done before his visit.    In terms of her dyslipidemia, she continues to take TriCor specifically for hypertriglyceridemia. She also has low HDL. LDL has been running low below 70. She is tolerating the medication without side effects.    Her blood pressure was previously controlled on losartan/HCTZ. Today the diastolic blood pressure is elevated.    She continues to take vitamin D supplementation for vitamin D deficiency.    She says she has been coughing with dry cough except in the morning there is phlegm which is yellowish in color. This is been ongoing since December 2017. She denies any shortness of breath. There is no nasal congestion, runny nose, postnasal drip, GERD symptoms. She denies any fever or chills. Patient continues to smoke one pack per day for the last 26 years.    Past medical history, past surgical history, family history reviewed-no changes    Social history reviewed-no changes    Allergies reviewed-no changes    Medications reviewed-no changes        ROS    As per history of present illness, the rest are negative.     Objective:     /90   Pulse 92   Temp 36.7 °C (98 °F)   Ht 1.651 m (5' 5\")   Wt 69.9 kg (154 lb 1.6 oz)   SpO2 95%   BMI 25.64 kg/m²      Physical Exam     Examined alert, awake, oriented, not in distress    Ears-tympanic membranes intact bilaterally without evidence of infection  Nose-no discharge, no obstruction  Throat-no erythema, tonsils not enlarged, no exudates  Neck-supple, no lymphadenopathy, no thyromegaly  Lungs-clear to auscultation, no rales, no " wheezes  Heart-regular rate and rhythm, no murmur  Extremities-no edema, clubbing, cyanosis     Hospital Outpatient Visit on 04/09/2018   Component Date Value   • Cholesterol,Tot 04/09/2018 134    • Triglycerides 04/09/2018 288*previously 281    • HDL 04/09/2018 26*previously 24    • LDL 04/09/2018 50 previously 52    • Sodium 04/09/2018 134*   • Potassium 04/09/2018 3.4*   • Chloride 04/09/2018 103    • Co2 04/09/2018 25    • Anion Gap 04/09/2018 6.0    • Glucose 04/09/2018 135*   • Bun 04/09/2018 17    • Creatinine 04/09/2018 0.74    • Calcium 04/09/2018 9.2    • AST(SGOT) 04/09/2018 18    • ALT(SGPT) 04/09/2018 25    • Alkaline Phosphatase 04/09/2018 42    • Total Bilirubin 04/09/2018 0.4    • Albumin 04/09/2018 4.3    • Total Protein 04/09/2018 7.9    • Globulin 04/09/2018 3.6*   • A-G Ratio 04/09/2018 1.2    • Glycohemoglobin 04/09/2018 6.4*previously 6.4    • Est Avg Glucose 04/09/2018 137    • 25-Hydroxy   Vitamin D 25 04/09/2018 63    • GFR If  04/09/2018 >60    • GFR If Non  [employer]meri* 04/09/2018 >60         Assessment/Plan:     1. Diabetes mellitus without complication (CMS-HCC)  This is still well controlled on low-dose metformin. Continue medication.  - HEMOGLOBIN A1C; Future  - COMP METABOLIC PANEL; Future  - LIPID PROFILE; Future    2. Dyslipidemia  Triglycerides are still high in the 200s. Advised to watch her diet more closely in terms of avoidance of sweets and decreasing carbs. HDL still runs in the 20s. Advised to increase activity with regular exercises to get this better. LDL at target. Continue fenofibrate.  - HEMOGLOBIN A1C; Future  - COMP METABOLIC PANEL; Future  - LIPID PROFILE; Future    3. Essential hypertension  Blood pressure specifically the diastolic blood pressure is slightly high today. Continue medication. Monitor blood pressure at home and keep a record. Advised to watch the salt intake, exercise regularly and keep a healthy weight. I will recheck in 3 months. We  will add another agent if not adequately controlled at that time.  - HEMOGLOBIN A1C; Future  - COMP METABOLIC PANEL; Future  - LIPID PROFILE; Future    4. Vitamin D deficiency  Vitamin D is normal to continue the same dose of vitamin D supplementation    5. Chronic cough  I will get a chest x-ray because of the chronic cough and history of chronic cigarette use. I will treat for possible URI/bronchitis with Zithromax Z-Jered as directed. Further recommendation will depend on results.    - azithromycin (ZITHROMAX) 250 MG Tab; Take 2 tabs today then 1 tab daily for 4 days.  Dispense: 6 Tab; Refill: 0  - DX-CHEST-2 VIEWS; Future      Please note that this dictation was created using voice recognition software. I have worked with consultants from the vendor as well as technical experts from [employer] to optimize the interface. I have made every reasonable attempt to correct obvious errors, but I expect that there are errors of grammar and possibly content I did not discover before finalizing the note.

## 2018-07-16 ENCOUNTER — APPOINTMENT (OUTPATIENT)
Dept: MEDICAL GROUP | Facility: PHYSICIAN GROUP | Age: 43
End: 2018-07-16
Payer: COMMERCIAL

## 2018-07-30 ENCOUNTER — APPOINTMENT (OUTPATIENT)
Dept: MEDICAL GROUP | Facility: PHYSICIAN GROUP | Age: 43
End: 2018-07-30
Payer: COMMERCIAL

## 2018-08-10 ENCOUNTER — HOSPITAL ENCOUNTER (OUTPATIENT)
Dept: LAB | Facility: MEDICAL CENTER | Age: 43
End: 2018-08-10
Attending: FAMILY MEDICINE
Payer: COMMERCIAL

## 2018-08-10 DIAGNOSIS — I10 ESSENTIAL HYPERTENSION: ICD-10-CM

## 2018-08-10 DIAGNOSIS — E11.9 DIABETES MELLITUS WITHOUT COMPLICATION (HCC): ICD-10-CM

## 2018-08-10 DIAGNOSIS — E78.5 DYSLIPIDEMIA: ICD-10-CM

## 2018-08-10 LAB
ALBUMIN SERPL BCP-MCNC: 4.5 G/DL (ref 3.2–4.9)
ALBUMIN/GLOB SERPL: 1.5 G/DL
ALP SERPL-CCNC: 35 U/L (ref 30–99)
ALT SERPL-CCNC: 23 U/L (ref 2–50)
ANION GAP SERPL CALC-SCNC: 6 MMOL/L (ref 0–11.9)
AST SERPL-CCNC: 19 U/L (ref 12–45)
BILIRUB SERPL-MCNC: 0.6 MG/DL (ref 0.1–1.5)
BUN SERPL-MCNC: 16 MG/DL (ref 8–22)
CALCIUM SERPL-MCNC: 9 MG/DL (ref 8.5–10.5)
CHLORIDE SERPL-SCNC: 103 MMOL/L (ref 96–112)
CHOLEST SERPL-MCNC: 121 MG/DL (ref 100–199)
CO2 SERPL-SCNC: 27 MMOL/L (ref 20–33)
CREAT SERPL-MCNC: 0.76 MG/DL (ref 0.5–1.4)
EST. AVERAGE GLUCOSE BLD GHB EST-MCNC: 146 MG/DL
GLOBULIN SER CALC-MCNC: 3.1 G/DL (ref 1.9–3.5)
GLUCOSE SERPL-MCNC: 110 MG/DL (ref 65–99)
HBA1C MFR BLD: 6.7 % (ref 0–5.6)
HDLC SERPL-MCNC: 24 MG/DL
LDLC SERPL CALC-MCNC: 43 MG/DL
POTASSIUM SERPL-SCNC: 3.5 MMOL/L (ref 3.6–5.5)
PROT SERPL-MCNC: 7.6 G/DL (ref 6–8.2)
SODIUM SERPL-SCNC: 136 MMOL/L (ref 135–145)
TRIGL SERPL-MCNC: 268 MG/DL (ref 0–149)

## 2018-08-10 PROCEDURE — 36415 COLL VENOUS BLD VENIPUNCTURE: CPT

## 2018-08-10 PROCEDURE — 83036 HEMOGLOBIN GLYCOSYLATED A1C: CPT

## 2018-08-10 PROCEDURE — 80061 LIPID PANEL: CPT

## 2018-08-10 PROCEDURE — 80053 COMPREHEN METABOLIC PANEL: CPT

## 2018-08-13 ENCOUNTER — OFFICE VISIT (OUTPATIENT)
Dept: MEDICAL GROUP | Facility: PHYSICIAN GROUP | Age: 43
End: 2018-08-13
Payer: COMMERCIAL

## 2018-08-13 VITALS
WEIGHT: 151.9 LBS | DIASTOLIC BLOOD PRESSURE: 70 MMHG | BODY MASS INDEX: 25.31 KG/M2 | SYSTOLIC BLOOD PRESSURE: 130 MMHG | HEIGHT: 65 IN | HEART RATE: 80 BPM | TEMPERATURE: 97.4 F | OXYGEN SATURATION: 97 %

## 2018-08-13 DIAGNOSIS — I10 ESSENTIAL HYPERTENSION: ICD-10-CM

## 2018-08-13 DIAGNOSIS — E11.9 DIABETES MELLITUS WITHOUT COMPLICATION (HCC): ICD-10-CM

## 2018-08-13 DIAGNOSIS — E01.0 THYROMEGALY: ICD-10-CM

## 2018-08-13 DIAGNOSIS — E55.9 VITAMIN D DEFICIENCY: ICD-10-CM

## 2018-08-13 DIAGNOSIS — E78.5 DYSLIPIDEMIA: ICD-10-CM

## 2018-08-13 PROCEDURE — 99214 OFFICE O/P EST MOD 30 MIN: CPT | Performed by: FAMILY MEDICINE

## 2018-08-13 RX ORDER — FENOFIBRATE 145 MG/1
TABLET, COATED ORAL
Qty: 30 TAB | Refills: 5 | Status: SHIPPED | OUTPATIENT
Start: 2018-08-13 | End: 2019-05-06 | Stop reason: SDUPTHER

## 2018-08-13 RX ORDER — LOSARTAN POTASSIUM AND HYDROCHLOROTHIAZIDE 12.5; 1 MG/1; MG/1
TABLET ORAL
Qty: 30 TAB | Refills: 5 | Status: SHIPPED | OUTPATIENT
Start: 2018-08-13 | End: 2019-05-06 | Stop reason: SDUPTHER

## 2018-08-13 ASSESSMENT — PATIENT HEALTH QUESTIONNAIRE - PHQ9: CLINICAL INTERPRETATION OF PHQ2 SCORE: 0

## 2018-08-13 NOTE — LETTER
[facility]  Raquel Bolanos M.D.  [address]  [place] [address]  Fax: [phone]   Authorization for Release/Disclosure of   Protected Health Information   Name: ITA JOHNSON : 1975 SSN: [id number]   Address: [address] [place] [address] [place] [address] Phone:    [phone] (home)    I authorize the entity listed below to release/disclose the PHI below to:   [facility]/Raquel Bolanos M.D. and Raquel Bolanos M.D.   Provider or Entity Name:    Dr. Lizabeth Olvera    Address   City, [facility]   Phone:      Fax:     Reason for request: continuity of care   Information to be released:    [  ] LAST COLONOSCOPY,  including any PATH REPORT and follow-up  [  ] LAST FIT/COLOGUARD RESULT [  ] LAST DEXA  [  ] LAST MAMMOGRAM  [  ] LAST PAP  [  ] LAST LABS [x  ] RETINA EXAM REPORT  [  ] IMMUNIZATION RECORDS  [  ] Release all info      [  ] Check here and initial the line next to each item to release ALL health information INCLUDING  _____ Care and treatment for drug and / or alcohol abuse  _____ HIV testing, infection status, or AIDS  _____ Genetic Testing    DATES OF SERVICE OR TIME PERIOD TO BE DISCLOSED: _____________  I understand and acknowledge that:  * This Authorization may be revoked at any time by you in writing, except if your health information has already been used or disclosed.  * Your health information that will be used or disclosed as a result of you signing this authorization could be re-disclosed by the recipient. If this occurs, your re-disclosed health information may no longer be protected by State or Federal laws.  * You may refuse to sign this Authorization. Your refusal will not affect your ability to obtain treatment.  * This Authorization becomes effective upon signing and will  on (date) __________.      If no date is indicated, this Authorization will  one (1) year from the signature date.    Name: Ita Johnson    Signature:   Date:     2018       PLEASE FAX  REQUESTED RECORDS BACK TO: [phone]

## 2018-08-14 NOTE — PROGRESS NOTES
"Subjective:      Ita Johnson is a 42 y.o. female who presents with Cough            HPI     Is here for follow-up of her medical problems.    For her diabetes mellitus type 2, she continues to take metformin milligrams 1 tablet daily only.  She denies side effects.  Blood work was done before this visit.    In terms of the dyslipidemia, she continues to take TriCor for elevated triglycerides also has low HDL running in the 20s.  Fortunately LDL runs below 70 even without statin.    In terms of her hypertension, this is under control on losartan/HCT without side effects.    We continue to follow her for vitamin D deficiency and she takes vitamin D supplementation regularly.    Last time I saw her 4 months ago she was complaining of chronic cough.  I gave her Z-Jered to treat for bacterial URI and she said the cough resolved.  I sent her for chest x-ray.  She did not do any more since the cough was already completely gone.    Past medical history, past surgical history, family history reviewed-no changes    Social history reviewed-no changes    Allergies reviewed-no changes    Medications reviewed-no changes    ROS     As per HPI, the rest are negative.       Objective:     /70   Pulse 80   Temp 36.3 °C (97.4 °F)   Ht 1.651 m (5' 5\")   Wt 68.9 kg (151 lb 14.4 oz)   SpO2 97%   BMI 25.28 kg/m²      Physical Exam     Examined alert, awake, oriented, not in distress    Neck-supple, no lymphadenopathy, positive slight thyromegaly without palpable nodules or tenderness  Lungs-clear to auscultation, no rales, no wheezes  Heart-regular rate and rhythm, no murmur  Extremities-no edema, clubbing, cyanosis       Hospital Outpatient Visit on 08/10/2018   Component Date Value   • Glycohemoglobin 08/10/2018 6.7*   • Est Avg Glucose 08/10/2018 146    • Sodium 08/10/2018 136    • Potassium 08/10/2018 3.5*   • Chloride 08/10/2018 103    • Co2 08/10/2018 27    • Anion Gap 08/10/2018 6.0    • Glucose 08/10/2018 110*   • " Bun 08/10/2018 16    • Creatinine 08/10/2018 0.76    • Calcium 08/10/2018 9.0    • AST(SGOT) 08/10/2018 19    • ALT(SGPT) 08/10/2018 23    • Alkaline Phosphatase 08/10/2018 35    • Total Bilirubin 08/10/2018 0.6    • Albumin 08/10/2018 4.5    • Total Protein 08/10/2018 7.6    • Globulin 08/10/2018 3.1    • A-G Ratio 08/10/2018 1.5    • Cholesterol,Tot 08/10/2018 121    • Triglycerides 08/10/2018 268*   • HDL 08/10/2018 24*   • LDL 08/10/2018 43    • GFR If  08/10/2018 >60    • GFR If Non  Ameri* 08/10/2018 >60         Assessment/Plan:     1. Diabetes mellitus without complication (HCC)  Controlled on her metformin.  She will continue current dose.  She will continue to watch her diet.  She said she already had her eye exam done earlier this year and I will get the records from her eye doctor.  - LIPID PROFILE; Future  - COMP METABOLIC PANEL; Future  - HEMOGLOBIN A1C; Future  - CBC WITH DIFFERENTIAL; Future  - MICROALBUMIN CREAT RATIO URINE; Future  - VITAMIN D,25 HYDROXY; Future    2. Dyslipidemia  Triglycerides are still high in the 200s, HDL still low in the 20s but LDL is at target below 70.  Continue TriCor.  Advised to exercise regularly with cardio exercises 30 minutes a day 4-5 days a week to make the HDL better.  Advised to avoid sweets and decrease the carbs to improve the triglycerides.  - LIPID PROFILE; Future  - COMP METABOLIC PANEL; Future  - HEMOGLOBIN A1C; Future  - CBC WITH DIFFERENTIAL; Future  - MICROALBUMIN CREAT RATIO URINE; Future  - VITAMIN D,25 HYDROXY; Future  - fenofibrate (TRICOR) 145 MG Tab; TAKE 1 TABLET BY MOUTH ONCE DAILY  Dispense: 30 Tab; Refill: 5    3. Essential hypertension  Controlled on her medication.  Continue the same medication.  - LIPID PROFILE; Future  - COMP METABOLIC PANEL; Future  - HEMOGLOBIN A1C; Future  - CBC WITH DIFFERENTIAL; Future  - MICROALBUMIN CREAT RATIO URINE; Future  - VITAMIN D,25 HYDROXY; Future  - losartan-hydrochlorothiazide  (HYZAAR) 100-12.5 MG per tablet; TAKE 1 TABLET BY MOUTH ONCE DAILY  Dispense: 30 Tab; Refill: 5    4. Vitamin D deficiency  Last vitamin D level is normal in April 2018.  Continue current dose of vitamin D supplementation.  - LIPID PROFILE; Future  - COMP METABOLIC PANEL; Future  - HEMOGLOBIN A1C; Future  - CBC WITH DIFFERENTIAL; Future  - MICROALBUMIN CREAT RATIO URINE; Future  - VITAMIN D,25 HYDROXY; Future    5. Thyromegaly  She has thyromegaly on exam.  I will check TSH and I will also send her for thyroid ultrasound.  - TSH; Future  - US-SOFT TISSUES OF HEAD - NECK; Future      Please note that this dictation was created using voice recognition software. I have worked with consultants from the vendor as well as technical experts from [facility]  [email or web address] to optimize the interface. I have made every reasonable attempt to correct obvious errors, but I expect that there are errors of grammar and possibly content I did not discover before finalizing the note.

## 2018-12-10 ENCOUNTER — APPOINTMENT (OUTPATIENT)
Dept: MEDICAL GROUP | Facility: PHYSICIAN GROUP | Age: 43
End: 2018-12-10
Payer: COMMERCIAL

## 2018-12-21 ENCOUNTER — HOSPITAL ENCOUNTER (OUTPATIENT)
Dept: LAB | Facility: MEDICAL CENTER | Age: 43
End: 2018-12-21
Attending: FAMILY MEDICINE
Payer: COMMERCIAL

## 2018-12-21 DIAGNOSIS — E01.0 THYROMEGALY: ICD-10-CM

## 2018-12-21 DIAGNOSIS — I10 ESSENTIAL HYPERTENSION: ICD-10-CM

## 2018-12-21 DIAGNOSIS — E55.9 VITAMIN D DEFICIENCY: ICD-10-CM

## 2018-12-21 DIAGNOSIS — E78.5 DYSLIPIDEMIA: ICD-10-CM

## 2018-12-21 DIAGNOSIS — E11.9 DIABETES MELLITUS WITHOUT COMPLICATION (HCC): ICD-10-CM

## 2018-12-21 LAB
25(OH)D3 SERPL-MCNC: 18 NG/ML (ref 30–100)
ALBUMIN SERPL BCP-MCNC: 4.2 G/DL (ref 3.2–4.9)
ALBUMIN/GLOB SERPL: 1.2 G/DL
ALP SERPL-CCNC: 44 U/L (ref 30–99)
ALT SERPL-CCNC: 28 U/L (ref 2–50)
ANION GAP SERPL CALC-SCNC: 10 MMOL/L (ref 0–11.9)
AST SERPL-CCNC: 17 U/L (ref 12–45)
BASOPHILS # BLD AUTO: 0.6 % (ref 0–1.8)
BASOPHILS # BLD: 0.05 K/UL (ref 0–0.12)
BILIRUB SERPL-MCNC: 0.4 MG/DL (ref 0.1–1.5)
BUN SERPL-MCNC: 16 MG/DL (ref 8–22)
CALCIUM SERPL-MCNC: 9.7 MG/DL (ref 8.5–10.5)
CHLORIDE SERPL-SCNC: 103 MMOL/L (ref 96–112)
CHOLEST SERPL-MCNC: 119 MG/DL (ref 100–199)
CO2 SERPL-SCNC: 23 MMOL/L (ref 20–33)
CREAT SERPL-MCNC: 0.65 MG/DL (ref 0.5–1.4)
CREAT UR-MCNC: 206.8 MG/DL
EOSINOPHIL # BLD AUTO: 0.29 K/UL (ref 0–0.51)
EOSINOPHIL NFR BLD: 3.5 % (ref 0–6.9)
ERYTHROCYTE [DISTWIDTH] IN BLOOD BY AUTOMATED COUNT: 47 FL (ref 35.9–50)
GLOBULIN SER CALC-MCNC: 3.5 G/DL (ref 1.9–3.5)
GLUCOSE SERPL-MCNC: 140 MG/DL (ref 65–99)
HCT VFR BLD AUTO: 44.4 % (ref 37–47)
HDLC SERPL-MCNC: 23 MG/DL
HGB BLD-MCNC: 14.3 G/DL (ref 12–16)
IMM GRANULOCYTES # BLD AUTO: 0.04 K/UL (ref 0–0.11)
IMM GRANULOCYTES NFR BLD AUTO: 0.5 % (ref 0–0.9)
LDLC SERPL CALC-MCNC: 56 MG/DL
LYMPHOCYTES # BLD AUTO: 2.85 K/UL (ref 1–4.8)
LYMPHOCYTES NFR BLD: 34.6 % (ref 22–41)
MCH RBC QN AUTO: 29.1 PG (ref 27–33)
MCHC RBC AUTO-ENTMCNC: 32.2 G/DL (ref 33.6–35)
MCV RBC AUTO: 90.4 FL (ref 81.4–97.8)
MICROALBUMIN UR-MCNC: 1.5 MG/DL
MICROALBUMIN/CREAT UR: 7 MG/G (ref 0–30)
MONOCYTES # BLD AUTO: 0.74 K/UL (ref 0–0.85)
MONOCYTES NFR BLD AUTO: 9 % (ref 0–13.4)
NEUTROPHILS # BLD AUTO: 4.27 K/UL (ref 2–7.15)
NEUTROPHILS NFR BLD: 51.8 % (ref 44–72)
NRBC # BLD AUTO: 0 K/UL
NRBC BLD-RTO: 0 /100 WBC
PLATELET # BLD AUTO: 400 K/UL (ref 164–446)
PMV BLD AUTO: 9.3 FL (ref 9–12.9)
POTASSIUM SERPL-SCNC: 3.7 MMOL/L (ref 3.6–5.5)
PROT SERPL-MCNC: 7.7 G/DL (ref 6–8.2)
RBC # BLD AUTO: 4.91 M/UL (ref 4.2–5.4)
SODIUM SERPL-SCNC: 136 MMOL/L (ref 135–145)
TRIGL SERPL-MCNC: 198 MG/DL (ref 0–149)
TSH SERPL DL<=0.005 MIU/L-ACNC: 1.4 UIU/ML (ref 0.38–5.33)
WBC # BLD AUTO: 8.2 K/UL (ref 4.8–10.8)

## 2018-12-21 PROCEDURE — 82570 ASSAY OF URINE CREATININE: CPT

## 2018-12-21 PROCEDURE — 83036 HEMOGLOBIN GLYCOSYLATED A1C: CPT

## 2018-12-21 PROCEDURE — 82306 VITAMIN D 25 HYDROXY: CPT

## 2018-12-21 PROCEDURE — 80053 COMPREHEN METABOLIC PANEL: CPT

## 2018-12-21 PROCEDURE — 36415 COLL VENOUS BLD VENIPUNCTURE: CPT

## 2018-12-21 PROCEDURE — 82043 UR ALBUMIN QUANTITATIVE: CPT

## 2018-12-21 PROCEDURE — 80061 LIPID PANEL: CPT

## 2018-12-21 PROCEDURE — 84443 ASSAY THYROID STIM HORMONE: CPT

## 2018-12-21 PROCEDURE — 85025 COMPLETE CBC W/AUTO DIFF WBC: CPT

## 2018-12-22 LAB
EST. AVERAGE GLUCOSE BLD GHB EST-MCNC: 151 MG/DL
HBA1C MFR BLD: 6.9 % (ref 0–5.6)

## 2018-12-24 ENCOUNTER — OFFICE VISIT (OUTPATIENT)
Dept: MEDICAL GROUP | Facility: PHYSICIAN GROUP | Age: 43
End: 2018-12-24
Payer: COMMERCIAL

## 2018-12-24 VITALS
OXYGEN SATURATION: 96 % | DIASTOLIC BLOOD PRESSURE: 70 MMHG | WEIGHT: 157.19 LBS | HEART RATE: 85 BPM | SYSTOLIC BLOOD PRESSURE: 100 MMHG | HEIGHT: 65 IN | TEMPERATURE: 97.7 F | BODY MASS INDEX: 26.19 KG/M2

## 2018-12-24 DIAGNOSIS — I10 ESSENTIAL HYPERTENSION: ICD-10-CM

## 2018-12-24 DIAGNOSIS — Z12.39 SCREENING FOR BREAST CANCER: ICD-10-CM

## 2018-12-24 DIAGNOSIS — G89.29 CHRONIC RIGHT SHOULDER PAIN: ICD-10-CM

## 2018-12-24 DIAGNOSIS — E55.9 VITAMIN D DEFICIENCY: ICD-10-CM

## 2018-12-24 DIAGNOSIS — E11.9 DIABETES MELLITUS WITHOUT COMPLICATION (HCC): ICD-10-CM

## 2018-12-24 DIAGNOSIS — Z23 NEED FOR VACCINATION: ICD-10-CM

## 2018-12-24 DIAGNOSIS — E78.5 DYSLIPIDEMIA: ICD-10-CM

## 2018-12-24 DIAGNOSIS — M25.511 CHRONIC RIGHT SHOULDER PAIN: ICD-10-CM

## 2018-12-24 PROCEDURE — 90686 IIV4 VACC NO PRSV 0.5 ML IM: CPT | Performed by: FAMILY MEDICINE

## 2018-12-24 PROCEDURE — 90715 TDAP VACCINE 7 YRS/> IM: CPT | Performed by: FAMILY MEDICINE

## 2018-12-24 PROCEDURE — 90471 IMMUNIZATION ADMIN: CPT | Performed by: FAMILY MEDICINE

## 2018-12-24 PROCEDURE — 99214 OFFICE O/P EST MOD 30 MIN: CPT | Mod: 25 | Performed by: FAMILY MEDICINE

## 2018-12-24 PROCEDURE — 90472 IMMUNIZATION ADMIN EACH ADD: CPT | Performed by: FAMILY MEDICINE

## 2018-12-24 NOTE — LETTER
[facility]  Raquel Bolanos M.D.  [address]  [place] [address]  Fax: [phone]   Authorization for Release/Disclosure of   Protected Health Information   Name: ITA JOHNSON : 1975 SSN: [id number]   Address: [address] [place] [address] [place] [address] Phone:    [phone] (home)    I authorize the entity listed below to release/disclose the PHI below to:   [facility]/Raquel Bolanos M.D. and Raquel Bolanos M.D.   Provider or Entity Name:    Lizabeth OlveraRAH   Address   City, [facility]   Phone:  [phone]    Fax:     Reason for request: continuity of care   Information to be released:    [  ] LAST COLONOSCOPY,  including any PATH REPORT and follow-up  [  ] LAST FIT/COLOGUARD RESULT [  ] LAST DEXA  [  ] LAST MAMMOGRAM  [  ] LAST PAP  [  ] LAST LABS [ x ] RETINA EXAM REPORT  [  ] IMMUNIZATION RECORDS  [  ] Release all info      [  ] Check here and initial the line next to each item to release ALL health information INCLUDING  _____ Care and treatment for drug and / or alcohol abuse  _____ HIV testing, infection status, or AIDS  _____ Genetic Testing    DATES OF SERVICE OR TIME PERIOD TO BE DISCLOSED: _____________  I understand and acknowledge that:  * This Authorization may be revoked at any time by you in writing, except if your health information has already been used or disclosed.  * Your health information that will be used or disclosed as a result of you signing this authorization could be re-disclosed by the recipient. If this occurs, your re-disclosed health information may no longer be protected by State or Federal laws.  * You may refuse to sign this Authorization. Your refusal will not affect your ability to obtain treatment.  * This Authorization becomes effective upon signing and will  on (date) __________.      If no date is indicated, this Authorization will  one (1) year from the signature date.    Name: Ita Johnson    Signature:   Date:     2018            PLEASE FAX REQUESTED RECORDS BACK TO: [phone]

## 2018-12-24 NOTE — PROGRESS NOTES
Subjective:      Ita Johnson is a 43 y.o. female who presents with Hypertension        HPI:    Patient returns for follow-up of her medical problems.    Patient is here to follow up on hypertension. She is currently taking losartan-HCTZ. Blood pressure today is improved at 100/70. Patient does not report any new or associated concerns regarding hypertension.    She has a history of diabetes for which she is taking metformin 500 mg once per day. A1C today is 6.9. Patient does not report any new or associated concerns regarding diabetes. Patient states she had an eye exam this year by Dr. Lizabeth Olvera. Will obtain records.    At last visit, patient's thyroid felt enlarged. Ultrasound evaluation was ordered, however, patient has not had this done yet. Patient states she canceled her initial appointment and has not received a call back to reschedule. Labs show thyroid functioning is within normal limits.    Recent labs show triglycerides decreased from 268 to 198. HDL level is at 23.Patient takes Tricor for dyslipidemia.  Patient does not report any new or associated concerns regarding dyslipidemia.     Patient's vitamin D levels have dropped significantly. She has been taking 2000 units of vitamin D.  She was previously taking 4000 international units daily.    Patient notes having right shoulder pain onset almost 4 months ago. Patient denies any known trauma to the area. She reports exacerbation of the pain with lifting. She notes having some limited range of motion to the shoulder. She only goes to the gym very occasionally. She has not been taking any medications for the pain.    She is due for a mammogram. She will schedule for a mammogram.      Past medical history, past surgical history, family history reviewed-no changes    Social history reviewed-no changes    Allergies reviewed-no changes    Medications reviewed-no changes     ROS:  As per the HPI as shown above, the rest are negative.       Objective:  "    /70 (BP Location: Left arm, Patient Position: Sitting, BP Cuff Size: Adult)   Pulse 85   Temp 36.5 °C (97.7 °F) (Temporal)   Ht 1.651 m (5' 5\")   Wt 71.3 kg (157 lb 3 oz)   SpO2 96%   BMI 26.16 kg/m²     Physical Exam    Examined alert, awake, oriented, not in distress    Neck-supple, no lymphadenopathy, positive slight thyromegaly without any palpable nodules  Lungs-clear to auscultation, no rales, no wheezes  Heart-regular rate and rhythm, no murmur  Extremities-no edema, clubbing, cyanosis, right shoulder exam-no deformity, positive tenderness AC joint, no tenderness subacromial space, mild limitation on forward elevation 160 degrees, abduction 160 degrees, mild limitation of internal and external rotation.    Monofilament testing with a 10 gram force: sensation intact: intact bilaterally  Visual Inspection: Feet without maceration, ulcers, fissures.  Pedal pulses: intact bilaterally      Labs:  Hospital Outpatient Visit on 12/21/2018   Component Date Value Ref Range Status   • Cholesterol,Tot 12/21/2018 119  100 - 199 mg/dL Final   • Triglycerides 12/21/2018 198* 0 - 149 mg/dL Final   • HDL 12/21/2018 23* >=40 mg/dL Final   • LDL 12/21/2018 56  <100 mg/dL Final   • Sodium 12/21/2018 136  135 - 145 mmol/L Final   • Potassium 12/21/2018 3.7  3.6 - 5.5 mmol/L Final   • Chloride 12/21/2018 103  96 - 112 mmol/L Final   • Co2 12/21/2018 23  20 - 33 mmol/L Final   • Anion Gap 12/21/2018 10.0  0.0 - 11.9 Final   • Glucose 12/21/2018 140* 65 - 99 mg/dL Final   • Bun 12/21/2018 16  8 - 22 mg/dL Final   • Creatinine 12/21/2018 0.65  0.50 - 1.40 mg/dL Final   • Calcium 12/21/2018 9.7  8.5 - 10.5 mg/dL Final   • AST(SGOT) 12/21/2018 17  12 - 45 U/L Final   • ALT(SGPT) 12/21/2018 28  2 - 50 U/L Final   • Alkaline Phosphatase 12/21/2018 44  30 - 99 U/L Final   • Total Bilirubin 12/21/2018 0.4  0.1 - 1.5 mg/dL Final   • Albumin 12/21/2018 4.2  3.2 - 4.9 g/dL Final   • Total Protein 12/21/2018 7.7  6.0 - 8.2 g/dL " Final   • Globulin 12/21/2018 3.5  1.9 - 3.5 g/dL Final   • A-G Ratio 12/21/2018 1.2  g/dL Final   • Glycohemoglobin 12/21/2018 6.9* 0.0 - 5.6 % Final    Comment: Increased risk for diabetes:  5.7 -6.4%  Diabetes:  >6.4%  Glycemic control for adults with diabetes:  <7.0%  The above interpretations are per ADA guidelines.  Diagnosis  of diabetes mellitus on the basis of elevated Hemoglobin A1c  should be confirmed by repeating the Hb A1c test.     • Est Avg Glucose 12/21/2018 151  mg/dL Final    Comment: The eAG calculation is based on the A1c-Derived Daily Glucose  (ADAG) study.  See the ADA's website for additional information.     • WBC 12/21/2018 8.2  4.8 - 10.8 K/uL Final   • RBC 12/21/2018 4.91  4.20 - 5.40 M/uL Final   • Hemoglobin 12/21/2018 14.3  12.0 - 16.0 g/dL Final   • Hematocrit 12/21/2018 44.4  37.0 - 47.0 % Final   • MCV 12/21/2018 90.4  81.4 - 97.8 fL Final   • MCH 12/21/2018 29.1  27.0 - 33.0 pg Final   • MCHC 12/21/2018 32.2* 33.6 - 35.0 g/dL Final   • RDW 12/21/2018 47.0  35.9 - 50.0 fL Final   • Platelet Count 12/21/2018 400  164 - 446 K/uL Final   • MPV 12/21/2018 9.3  9.0 - 12.9 fL Final   • Neutrophils-Polys 12/21/2018 51.80  44.00 - 72.00 % Final   • Lymphocytes 12/21/2018 34.60  22.00 - 41.00 % Final   • Monocytes 12/21/2018 9.00  0.00 - 13.40 % Final   • Eosinophils 12/21/2018 3.50  0.00 - 6.90 % Final   • Basophils 12/21/2018 0.60  0.00 - 1.80 % Final   • Immature Granulocytes 12/21/2018 0.50  0.00 - 0.90 % Final   • Nucleated RBC 12/21/2018 0.00  /100 WBC Final   • Neutrophils (Absolute) 12/21/2018 4.27  2.00 - 7.15 K/uL Final    Includes immature neutrophils, if present.   • Lymphs (Absolute) 12/21/2018 2.85  1.00 - 4.80 K/uL Final   • Monos (Absolute) 12/21/2018 0.74  0.00 - 0.85 K/uL Final   • Eos (Absolute) 12/21/2018 0.29  0.00 - 0.51 K/uL Final   • Baso (Absolute) 12/21/2018 0.05  0.00 - 0.12 K/uL Final   • Immature Granulocytes (abs) 12/21/2018 0.04  0.00 - 0.11 K/uL Final   • NRBC  (Absolute) 12/21/2018 0.00  K/uL Final   • Creatinine, Urine 12/21/2018 206.80  mg/dL Final   • Microalbumin, Urine Random 12/21/2018 1.5  mg/dL Final   • Micro Alb Creat Ratio 12/21/2018 7  0 - 30 mg/g Final   • 25-Hydroxy   Vitamin D 25 12/21/2018 18* 30 - 100 ng/mL Final    Comment: Adult Ranges:   <20 ng/mL - Deficiency  20-29 ng/mL - Insufficiency   ng/mL - Sufficiency  The [employer] Centaur Vitamin D Assay is standardized to the  [facility] reference measurement procedures, a  reference method for the Vitamin D Standardization Program  (VDSP).  The VDSP aligns patient results among 25 (OH)  Vitamin D methods.     • TSH 12/21/2018 1.400  0.380 - 5.330 uIU/mL Final    Comment: Please note new reference ranges effective 12/14/2017 10:00 AM  Pregnant Females, 1st Trimester  0.050-3.700  Pregnant Females, 2nd Trimester  0.310-4.350  Pregnant Females, 3rd Trimester  0.410-5.180     • GFR If  12/21/2018 >60  >60 mL/min/1.73 m 2 Final   • GFR If Non  12/21/2018 >60  >60 mL/min/1.73 m 2 Final             Assessment/Plan:   1. Need for vaccination  - Patient is due for influenza and tdap vaccinations.  - Influenza Vaccine Quad Injection >3Y (PF)  - Tdap Vaccine =>6YO IM    2. Diabetes mellitus without complication (HCC)  - A1C is 6.9 on 12/21/18.  This is under control.  - Patient is taking metformin. She will continue current plan of care and medications.  - Patient advised on diet and exercise. Advised her to decreased intake of carbs and sugary foods    3. Dyslipidemia  - Patient is on Tricor. Triglycerides decreased from 268  to 198 on 12/21/18. She will continue current plan of care and medications.  HDL is still low in the 20s.  LDL at target below 70.  Advised to continue to avoid sweets, decrease the carbs, increase activity with regular exercise and continue to avoid fatty foods.    4. Essential hypertension  - Blood pressure is controlled today at 100/70  - Patient is on  losartan-HCTZ. Potassium is 3.7 on recent labs  -Continue medication.    5. Vitamin D deficiency  - Patient is only taking 2000 units vitamin D. Labs show significant reduced vitamin D levels.  - Patient instructed to double dose of vitamin D to 4000 units    6. Chronic right shoulder pain  - Patient reports right shoulder pain onset 4 months ago. She denies any recent trauma.  Further recommendation will depend on results.  - Ordering xray to be completed at [facility]  - DX-SHOULDER 2+ RIGHT; Future    7. Screening for breast cancer  - Patient is due to mammogram. Instructed her to schedule the mammogram, will order mammogram.   - MA-SCREEN MAMMO W/CAD-BILAT; Future      8.  Thyromegaly  - Patient did not get thyroid ultrasound done which was ordered at last visit. Informed her thyroid functioning on labs are within normal limits, however, we still need to get the ultrasound due to enlarged thyroid gland to rule out any masses.  Order from prior visit is still good. Encouraged her to get ultrasound done.      IWale (Carolinibe), am scribing for, and in the presence of, Raquel Bolanos MD     Electronically signed by: Wale Lane (Scribe), 12/24/2018    IRaquel MD personally performed the services described in this documentation, as scribed by Wale Lane in my presence, and it is both accurate and complete.

## 2019-01-10 ENCOUNTER — HOSPITAL ENCOUNTER (OUTPATIENT)
Dept: RADIOLOGY | Facility: MEDICAL CENTER | Age: 44
End: 2019-01-10

## 2019-01-21 ENCOUNTER — HOSPITAL ENCOUNTER (OUTPATIENT)
Dept: RADIOLOGY | Facility: MEDICAL CENTER | Age: 44
End: 2019-01-21
Attending: FAMILY MEDICINE
Payer: COMMERCIAL

## 2019-01-21 DIAGNOSIS — G89.29 CHRONIC RIGHT SHOULDER PAIN: ICD-10-CM

## 2019-01-21 DIAGNOSIS — Z12.39 SCREENING FOR BREAST CANCER: ICD-10-CM

## 2019-01-21 DIAGNOSIS — M25.511 CHRONIC RIGHT SHOULDER PAIN: ICD-10-CM

## 2019-01-21 PROCEDURE — 73030 X-RAY EXAM OF SHOULDER: CPT | Mod: RT

## 2019-01-21 PROCEDURE — 77067 SCR MAMMO BI INCL CAD: CPT

## 2019-01-22 ENCOUNTER — HOSPITAL ENCOUNTER (OUTPATIENT)
Dept: LAB | Facility: MEDICAL CENTER | Age: 44
End: 2019-01-22
Attending: FAMILY MEDICINE
Payer: COMMERCIAL

## 2019-01-22 ENCOUNTER — OFFICE VISIT (OUTPATIENT)
Dept: MEDICAL GROUP | Facility: PHYSICIAN GROUP | Age: 44
End: 2019-01-22
Payer: COMMERCIAL

## 2019-01-22 VITALS
OXYGEN SATURATION: 97 % | WEIGHT: 159.24 LBS | HEART RATE: 88 BPM | BODY MASS INDEX: 26.53 KG/M2 | HEIGHT: 65 IN | DIASTOLIC BLOOD PRESSURE: 80 MMHG | SYSTOLIC BLOOD PRESSURE: 130 MMHG | TEMPERATURE: 98.3 F

## 2019-01-22 DIAGNOSIS — L29.9 PRURITUS: ICD-10-CM

## 2019-01-22 DIAGNOSIS — R92.8 ABNORMAL MAMMOGRAM OF LEFT BREAST: ICD-10-CM

## 2019-01-22 DIAGNOSIS — M79.89 FINGER SWELLING: ICD-10-CM

## 2019-01-22 DIAGNOSIS — M65.20 CALCIFIC TENDINITIS: ICD-10-CM

## 2019-01-22 LAB
ALBUMIN SERPL BCP-MCNC: 4.5 G/DL (ref 3.2–4.9)
ALBUMIN/GLOB SERPL: 1.3 G/DL
ALP SERPL-CCNC: 52 U/L (ref 30–99)
ALT SERPL-CCNC: 33 U/L (ref 2–50)
ANION GAP SERPL CALC-SCNC: 6 MMOL/L (ref 0–11.9)
ANISOCYTOSIS BLD QL SMEAR: ABNORMAL
AST SERPL-CCNC: 18 U/L (ref 12–45)
BASOPHILS # BLD AUTO: 0.8 % (ref 0–1.8)
BASOPHILS # BLD: 0.1 K/UL (ref 0–0.12)
BILIRUB SERPL-MCNC: 0.3 MG/DL (ref 0.1–1.5)
BUN SERPL-MCNC: 19 MG/DL (ref 8–22)
CALCIUM SERPL-MCNC: 10.4 MG/DL (ref 8.5–10.5)
CHLORIDE SERPL-SCNC: 102 MMOL/L (ref 96–112)
CO2 SERPL-SCNC: 23 MMOL/L (ref 20–33)
CREAT SERPL-MCNC: 0.71 MG/DL (ref 0.5–1.4)
CRP SERPL HS-MCNC: 0.17 MG/DL (ref 0–0.75)
EOSINOPHIL # BLD AUTO: 0.33 K/UL (ref 0–0.51)
EOSINOPHIL NFR BLD: 2.6 % (ref 0–6.9)
ERYTHROCYTE [DISTWIDTH] IN BLOOD BY AUTOMATED COUNT: 44.8 FL (ref 35.9–50)
ERYTHROCYTE [SEDIMENTATION RATE] IN BLOOD BY WESTERGREN METHOD: 10 MM/HOUR (ref 0–20)
GLOBULIN SER CALC-MCNC: 3.5 G/DL (ref 1.9–3.5)
GLUCOSE SERPL-MCNC: 106 MG/DL (ref 65–99)
HCT VFR BLD AUTO: 45.5 % (ref 37–47)
HGB BLD-MCNC: 14.8 G/DL (ref 12–16)
LYMPHOCYTES # BLD AUTO: 4.89 K/UL (ref 1–4.8)
LYMPHOCYTES NFR BLD: 38.8 % (ref 22–41)
MACROCYTES BLD QL SMEAR: ABNORMAL
MANUAL DIFF BLD: NORMAL
MCH RBC QN AUTO: 29 PG (ref 27–33)
MCHC RBC AUTO-ENTMCNC: 32.5 G/DL (ref 33.6–35)
MCV RBC AUTO: 89.2 FL (ref 81.4–97.8)
MICROCYTES BLD QL SMEAR: ABNORMAL
MONOCYTES # BLD AUTO: 0.87 K/UL (ref 0–0.85)
MONOCYTES NFR BLD AUTO: 6.9 % (ref 0–13.4)
MORPHOLOGY BLD-IMP: NORMAL
NEUTROPHILS # BLD AUTO: 6.41 K/UL (ref 2–7.15)
NEUTROPHILS NFR BLD: 50.9 % (ref 44–72)
NRBC # BLD AUTO: 0 K/UL
NRBC BLD-RTO: 0 /100 WBC
PLATELET # BLD AUTO: 435 K/UL (ref 164–446)
PLATELET BLD QL SMEAR: NORMAL
PMV BLD AUTO: 9.2 FL (ref 9–12.9)
POTASSIUM SERPL-SCNC: 3.4 MMOL/L (ref 3.6–5.5)
PROT SERPL-MCNC: 8 G/DL (ref 6–8.2)
RBC # BLD AUTO: 5.1 M/UL (ref 4.2–5.4)
RBC BLD AUTO: PRESENT
RHEUMATOID FACT SER IA-ACNC: 34 IU/ML (ref 0–14)
SODIUM SERPL-SCNC: 131 MMOL/L (ref 135–145)
WBC # BLD AUTO: 12.6 K/UL (ref 4.8–10.8)

## 2019-01-22 PROCEDURE — 86431 RHEUMATOID FACTOR QUANT: CPT

## 2019-01-22 PROCEDURE — 80053 COMPREHEN METABOLIC PANEL: CPT

## 2019-01-22 PROCEDURE — 85652 RBC SED RATE AUTOMATED: CPT

## 2019-01-22 PROCEDURE — 86038 ANTINUCLEAR ANTIBODIES: CPT

## 2019-01-22 PROCEDURE — 99214 OFFICE O/P EST MOD 30 MIN: CPT | Mod: 25 | Performed by: FAMILY MEDICINE

## 2019-01-22 PROCEDURE — 36415 COLL VENOUS BLD VENIPUNCTURE: CPT

## 2019-01-22 PROCEDURE — 85027 COMPLETE CBC AUTOMATED: CPT

## 2019-01-22 PROCEDURE — 86140 C-REACTIVE PROTEIN: CPT

## 2019-01-22 PROCEDURE — 86200 CCP ANTIBODY: CPT

## 2019-01-22 PROCEDURE — 20610 DRAIN/INJ JOINT/BURSA W/O US: CPT | Mod: RT | Performed by: FAMILY MEDICINE

## 2019-01-22 PROCEDURE — 85007 BL SMEAR W/DIFF WBC COUNT: CPT

## 2019-01-22 RX ORDER — TRIAMCINOLONE ACETONIDE 40 MG/ML
80 INJECTION, SUSPENSION INTRA-ARTICULAR; INTRAMUSCULAR ONCE
Status: COMPLETED | OUTPATIENT
Start: 2019-01-22 | End: 2019-01-22

## 2019-01-22 RX ADMIN — TRIAMCINOLONE ACETONIDE 80 MG: 40 INJECTION, SUSPENSION INTRA-ARTICULAR; INTRAMUSCULAR at 16:34

## 2019-01-22 ASSESSMENT — PATIENT HEALTH QUESTIONNAIRE - PHQ9: CLINICAL INTERPRETATION OF PHQ2 SCORE: 0

## 2019-01-22 NOTE — PROGRESS NOTES
Subjective:      Ita Johnson is a 43 y.o. female who presents with Rash (face all over body)        HPI:  Established patient, here today, complaining acutely of a waxing and waning diffuse body itching and rash onset 1/10/19.  Patient reports that her symptoms intermittently begin as a diffuse itching sensation to her face, chest or arms. If she does not scratch at her itching sensation, a rash will not appear, but when she starts to scratch.  She gets redness of the face, chest and neck after she showers but without any itching.  When the patient wakes up in the morning, she experiences no itching or rash. These symptoms gradually progress throughout the day, worse in the evening time.   Patient states that prior to her symptoms beginning, she was at a massage spa. She developed the itching first, then the massage therapist, when she uncovered her back and noticed several diffuse raised red bumps. These are described as itchy, but not really painful rash. The rash began on her back, and spread to her chest and arms. She has not experienced any rash or itching along her lower extremities. Patient reports no new recent environmental exposures such as change to cosmetics, detergents, body soap, lotion, floorings, pets, or chemical exposures at work. Patient has 3 [place]ians(1male 2 female) that have been living with her for 8 years. She has also been experiencing intermittent mild lip itching associated with these episodes of itching, but has no complaints of shortness of breath. Patient attempted to take OTC allergy medication, Zyrtec, with no relief of her symptoms. She reports no frequent use of NSAIDs.  She has not taken any new over-the-counter medications or supplements.    Patient is also experiencing bilateral hand swelling that has been present for 4 months without pain.  This is noted at the end of a workday.  She said her rings get very tight at the end of the day due to the swelling.    She had  "x-ray done of the right shoulder to evaluate for chronic shoulder pain.  She had mild limitation of range of movement on exam.  The x-ray showed calcific tendinitis and cystic changes in the acromion otherwise no abnormalities.    Past medical history, past surgical history, family history reviewed-no changes    Social history reviewed-no changes    Allergies reviewed-no changes    Medications reviewed-no changes      ROS:  As per the HPI as shown above, the rest are negative.       Objective:     /80 (BP Location: Left arm, Patient Position: Sitting, BP Cuff Size: Adult)   Pulse 88   Temp 36.8 °C (98.3 °F) (Temporal)   Ht 1.651 m (5' 5\")   Wt 72.2 kg (159 lb 3.8 oz)   SpO2 97%   BMI 26.50 kg/m²     Physical Exam  Examined alert, awake, oriented, not in distress  Skin: Linear scratch marks on the chest otherwise no rashes  Neck-supple, no lymphadenopathy, no thyromegaly  Lungs-clear to auscultation, no rales, no wheezes  Heart-regular rate and rhythm, no murmur  Extremities-she has swelling of the front lunges both hands but no swelling of the interphalangeal joints and the MCP joints, no tenderness on palpation of the finger joints, normal wrist joints, intact neurovascular status       Assessment/Plan:   1. Pruritus  Patient has been experiencing diffuse body itching and rash for the last 10 days or so. She denies any new environmental factors, and symptoms were not improved with a single dose of allergy medication. I informed the patient that because there is no obvious origin of her symptoms, I would like to order blood work for more detailed evaluation, such as for autoimmune disorders, liver disease, other metabolic or hematologic problem. Patient understands and agrees with treatment plan. Patient also advised to take Zyrtec daily to see if this prolonged regimen can help manage her itching rash.  Further recommendation will depend on results.  - CBC WITH DIFFERENTIAL; Future  - COMP METABOLIC " PANEL; Future  - TONIA IGG ISMAEL W/RFLX TO TNOIA IGG IFA; Future  - RHEUMATOID ARTHRITIS FACTOR; Future  - WESTERGREN SED RATE; Future  - CRP QUANTITIVE (NON-CARDIAC); Future  - CCP ANTIBODY; Future    2. Finger swelling  Patient has been experiencing intermittent finger swelling for the last 4 months, resolved in the morning time, progressively worsening into the evening. She reports no pain along her hands with this swelling. Secondary to the newly onset rash and itching as outlined above, I informed the patient that I would evaluate for obvious origins of this swelling such as autoimmune disease, as well as with xray of the hands. Patient understands and agrees with treatment plan.  - CBC WITH DIFFERENTIAL; Future  - COMP METABOLIC PANEL; Future  - TONIA IGG ISMAEL W/RFLX TO TONIA IGG IFA; Future  - RHEUMATOID ARTHRITIS FACTOR; Future  - WESTERGREN SED RATE; Future  - CRP QUANTITIVE (NON-CARDIAC); Future  - CCP ANTIBODY; Future  - DX-JOINT SURVEY-HANDS SINGLE VIEW; Future    3. Calcific tendinitis  Patient has been experiencing non-traumatic right shoulder pain for the last 6 months, and recently completed a right shoulder xray that showed calcific tendinitis.. I will complete a steroid joint injection here today to see if this helps resolve some of her discomfort.  Landmarks were identified.  I cleansed the area with Betadine sticks x3.  I injected into the subacromial space 4 cc of 2% lidocaine, 2 cc of 40 mg/mL of Kenalog.  Patient tolerated procedure well.  Post injection instructions given    Rosemary HERNANDES (Judy), am scribing for, and in the presence of, Raquel Bolanos MD     Electronically signed by: Rosemary Elias (Judy), 1/22/2019    IRaquel MD personally performed the services described in this documentation, as scribed by Rosemary Elias in my presence, and it is both accurate and complete.

## 2019-01-24 LAB
CCP IGG SERPL-ACNC: 3 UNITS (ref 0–19)
NUCLEAR IGG SER QL IA: NORMAL

## 2019-01-25 DIAGNOSIS — D72.829 LEUKOCYTOSIS, UNSPECIFIED TYPE: ICD-10-CM

## 2019-01-28 ENCOUNTER — HOSPITAL ENCOUNTER (OUTPATIENT)
Dept: RADIOLOGY | Facility: MEDICAL CENTER | Age: 44
End: 2019-01-28
Attending: FAMILY MEDICINE
Payer: COMMERCIAL

## 2019-01-28 DIAGNOSIS — M79.89 FINGER SWELLING: ICD-10-CM

## 2019-01-28 DIAGNOSIS — R92.8 ABNORMAL MAMMOGRAM: ICD-10-CM

## 2019-01-28 DIAGNOSIS — R92.8 ABNORMAL MAMMOGRAM OF LEFT BREAST: ICD-10-CM

## 2019-01-28 PROCEDURE — 77077 JOINT SURVEY SINGLE VIEW: CPT

## 2019-01-28 PROCEDURE — 76642 ULTRASOUND BREAST LIMITED: CPT | Mod: LT

## 2019-01-28 PROCEDURE — 77065 DX MAMMO INCL CAD UNI: CPT | Mod: LT

## 2019-02-04 ENCOUNTER — HOSPITAL ENCOUNTER (OUTPATIENT)
Dept: RADIOLOGY | Facility: MEDICAL CENTER | Age: 44
End: 2019-02-04
Attending: FAMILY MEDICINE
Payer: COMMERCIAL

## 2019-02-04 DIAGNOSIS — E01.0 THYROMEGALY: ICD-10-CM

## 2019-02-04 PROCEDURE — 76536 US EXAM OF HEAD AND NECK: CPT

## 2019-02-20 ENCOUNTER — OFFICE VISIT (OUTPATIENT)
Dept: MEDICAL GROUP | Facility: PHYSICIAN GROUP | Age: 44
End: 2019-02-20
Payer: COMMERCIAL

## 2019-02-20 ENCOUNTER — HOSPITAL ENCOUNTER (OUTPATIENT)
Facility: MEDICAL CENTER | Age: 44
End: 2019-02-20
Attending: FAMILY MEDICINE
Payer: COMMERCIAL

## 2019-02-20 VITALS
SYSTOLIC BLOOD PRESSURE: 120 MMHG | BODY MASS INDEX: 26.34 KG/M2 | WEIGHT: 158.07 LBS | TEMPERATURE: 97.5 F | DIASTOLIC BLOOD PRESSURE: 80 MMHG | HEIGHT: 65 IN | HEART RATE: 94 BPM | OXYGEN SATURATION: 96 %

## 2019-02-20 DIAGNOSIS — L70.9 ACNE, UNSPECIFIED ACNE TYPE: ICD-10-CM

## 2019-02-20 DIAGNOSIS — M79.89 FINGER SWELLING: ICD-10-CM

## 2019-02-20 DIAGNOSIS — N92.1 METRORRHAGIA: ICD-10-CM

## 2019-02-20 DIAGNOSIS — L29.9 PRURITUS: ICD-10-CM

## 2019-02-20 DIAGNOSIS — M75.31 CALCIFIC TENDINITIS OF RIGHT SHOULDER: ICD-10-CM

## 2019-02-20 PROCEDURE — 87591 N.GONORRHOEAE DNA AMP PROB: CPT

## 2019-02-20 PROCEDURE — 99000 SPECIMEN HANDLING OFFICE-LAB: CPT | Performed by: FAMILY MEDICINE

## 2019-02-20 PROCEDURE — 87480 CANDIDA DNA DIR PROBE: CPT

## 2019-02-20 PROCEDURE — 87660 TRICHOMONAS VAGIN DIR PROBE: CPT

## 2019-02-20 PROCEDURE — 99214 OFFICE O/P EST MOD 30 MIN: CPT | Performed by: FAMILY MEDICINE

## 2019-02-20 PROCEDURE — 87510 GARDNER VAG DNA DIR PROBE: CPT

## 2019-02-20 PROCEDURE — 87491 CHLMYD TRACH DNA AMP PROBE: CPT

## 2019-02-20 RX ORDER — CLINDAMYCIN PHOSPHATE 10 MG/G
GEL TOPICAL
Qty: 1 TUBE | Refills: 1 | Status: SHIPPED | OUTPATIENT
Start: 2019-02-20 | End: 2019-08-12 | Stop reason: SDUPTHER

## 2019-02-20 NOTE — PROGRESS NOTES
Subjective:      Ita Johnson is a 43 y.o. female who presents with Menstrual Problem        HPI:  Patient presents today with concerns for an ongoing menstrual period, face break out and a follow-up of issues related to her last visit.    Ongoing menstrual period  Patient presents today with concerns for an ongoing menstrual period beginning on ; this is approximately one week after her period usually begins. She states it began as normal with spotting on the . It continued to be normal bleeding on the . It was lighter on the  day, which is typical for her. This then continued for about a week. Following this, she began having heavy bleeding which has continued to today. She also developed cramping this week which has been somewhat alleviated by taking Advil.  She is  0 para 0.  She has been  to the same partner for several years.  No history of STDs.  Last Pap smear was in  and was normal.  No prior problems with her menstrual periods.    Face break out  She said she has been breaking out on her face especially in the jaw area.  She said she never had problems with acne before.  She could not specify how long ago this started.  This could be steroid-induced acne because we gave her steroid injection a month ago.    Calcific Tendinitis  On her last visit, patient received an injection into her right shoulder due to her calcific tendinitis. Her pain was alleviated for a couple weeks, but she believes it has gradually began to return. The pain is still very minimal and mostly only noticeable at the very end of ranging her shoulder. There is otherwise a full ROM. No recent trauma.    Finger swelling  She continues to have mild swelling and minimal pain in her fingers typically at the end of the day. There is typically no swelling in the morning. She adds she uses her hands frequently at work to type, write, and text. No arthritis was seen on her past xrays. Her lab work  "was also normal with no evidence of inflammation.  She had a mild elevation of the rheumatoid factor but her CCP antibody was normal.  Sister has seronegative rheumatoid arthritis.    Pruritus  Patient has been taking Claritin once daily which has entirely alleviated this issue.        Past medical history, past surgical history, family history reviewed-no changes    Social history reviewed-no changes    Allergies reviewed-no changes    Medications reviewed-no changes      ROS:  As per the HPI as shown above, the rest are negative.       Objective:     /80 (BP Location: Left arm, Patient Position: Sitting, BP Cuff Size: Adult)   Pulse 94   Temp 36.4 °C (97.5 °F) (Temporal)   Ht 1.651 m (5' 5\")   Wt 71.7 kg (158 lb 1.1 oz)   SpO2 96%   BMI 26.30 kg/m²     Physical Exam     Examined alert, awake, oriented, not in distress    Neck-supple, no lymphadenopathy, no thyromegaly  Lungs-clear to auscultation, no rales, no wheezes  Heart-regular rate and rhythm, no murmur  Extremities-no edema, clubbing, cyanosis, right shoulder exam-full range of movement, no deformities, no tenderness, puffiness noted of the fingers of both hands without any tenderness of the interphalangeal and MCP joints, no erythema skin of the fingers, full range of movement of the finger joints  Skin- erythematous papules without pustules scattered in the mandibular and submandibular areas and the chin  Pelvic exam-introitus nulliparous, no vaginal or vulvar lesions, BUS normal, cervix with minimal bleeding, no cervical lesions, no CMT, uterus not enlarged, no adnexal masses or tenderness       Labs:  Hospital Outpatient Visit on 01/22/2019   Component Date Value Ref Range Status   • WBC 01/22/2019 12.6* 4.8 - 10.8 K/uL Final   • RBC 01/22/2019 5.10  4.20 - 5.40 M/uL Final   • Hemoglobin 01/22/2019 14.8  12.0 - 16.0 g/dL Final   • Hematocrit 01/22/2019 45.5  37.0 - 47.0 % Final   • MCV 01/22/2019 89.2  81.4 - 97.8 fL Final   • MCH 01/22/2019 " 29.0  27.0 - 33.0 pg Final   • MCHC 01/22/2019 32.5* 33.6 - 35.0 g/dL Final   • RDW 01/22/2019 44.8  35.9 - 50.0 fL Final   • Platelet Count 01/22/2019 435  164 - 446 K/uL Final   • MPV 01/22/2019 9.2  9.0 - 12.9 fL Final   • Nucleated RBC 01/22/2019 0.00  /100 WBC Final   • NRBC (Absolute) 01/22/2019 0.00  K/uL Final   • Neutrophils-Polys 01/22/2019 50.90  44.00 - 72.00 % Final   • Lymphocytes 01/22/2019 38.80  22.00 - 41.00 % Final   • Monocytes 01/22/2019 6.90  0.00 - 13.40 % Final   • Eosinophils 01/22/2019 2.60  0.00 - 6.90 % Final   • Basophils 01/22/2019 0.80  0.00 - 1.80 % Final   • Neutrophils (Absolute) 01/22/2019 6.41  2.00 - 7.15 K/uL Final    Includes immature neutrophils, if present.   • Lymphs (Absolute) 01/22/2019 4.89* 1.00 - 4.80 K/uL Final   • Monos (Absolute) 01/22/2019 0.87* 0.00 - 0.85 K/uL Final   • Eos (Absolute) 01/22/2019 0.33  0.00 - 0.51 K/uL Final   • Baso (Absolute) 01/22/2019 0.10  0.00 - 0.12 K/uL Final   • Anisocytosis 01/22/2019 1+   Final   • Macrocytosis 01/22/2019 1+   Final   • Microcytosis 01/22/2019 1+   Final   • Sodium 01/22/2019 131* 135 - 145 mmol/L Final   • Potassium 01/22/2019 3.4* 3.6 - 5.5 mmol/L Final   • Chloride 01/22/2019 102  96 - 112 mmol/L Final   • Co2 01/22/2019 23  20 - 33 mmol/L Final   • Anion Gap 01/22/2019 6.0  0.0 - 11.9 Final   • Glucose 01/22/2019 106* 65 - 99 mg/dL Final   • Bun 01/22/2019 19  8 - 22 mg/dL Final   • Creatinine 01/22/2019 0.71  0.50 - 1.40 mg/dL Final   • Calcium 01/22/2019 10.4  8.5 - 10.5 mg/dL Final   • AST(SGOT) 01/22/2019 18  12 - 45 U/L Final   • ALT(SGPT) 01/22/2019 33  2 - 50 U/L Final   • Alkaline Phosphatase 01/22/2019 52  30 - 99 U/L Final   • Total Bilirubin 01/22/2019 0.3  0.1 - 1.5 mg/dL Final   • Albumin 01/22/2019 4.5  3.2 - 4.9 g/dL Final   • Total Protein 01/22/2019 8.0  6.0 - 8.2 g/dL Final   • Globulin 01/22/2019 3.5  1.9 - 3.5 g/dL Final   • A-G Ratio 01/22/2019 1.3  g/dL Final   • Antinuclear Antibody 01/22/2019  None Detected  None Detected Final    Comment: If suspicion of connective tissue disease is strong and TONIA EIA is  negative, consider testing for TONIA by IFA ([id number]).  INTERPRETIVE INFORMATION: Anti-Nuclear Antibodies (TONIA), IgG by  ISMAEL  Anti-Nuclear Antibodies (TONIA), IgG by ISMAEL: TONIA specimens are  screened using enzyme-linked immunosorbent assay (ISMAEL)  methodology. All ISMAEL results reported as Detected are further  tested by indirect fluorescent assay (IFA) using HEp-2 substrate  with an IgG-specific conjugate. The TONIA ISMAEL screen is designed  to detect antibodies against dsDNA, histone, SS-A (Ro), SS-B (La),  Guerrero, snRNP/Sm, Scl-70, Ida-1, centromere, and an extract of lysed  HEp-2 cells. TONIA ISMAEL assays have been reported to have lower  sensitivities than TONIA IFA for systemic autoimmune rheumatic  diseases (SARD).  Negative results do not necessarily rule out SARD.  Performed by [facility],  [address] [place] [id number] [phone]  [email or web address], Jj Sherman MD - Lab. Director     • Rheumatoid Factor -Neph- 01/22/2019 34* 0 - 14 IU/mL Final   • Sed Rate Westergren 01/22/2019 10  0 - 20 mm/hour Final   • Stat C-Reactive Protein 01/22/2019 0.17  0.00 - 0.75 mg/dL Final   • Ccp Antibodies 01/22/2019 3  0 - 19 Units Final    Comment: INTERPRETIVE INFORMATION: Cyclic Citrullinated Peptide Antibody,  IgG  19 Units or less ................... Negative  20-39 Units ........................ Weak Positive  40-59 Units ........................ Moderate Positive  60 Units or greater ................ Strong Positive  Anti-cyclic citrullinated peptide (anti-CCP), IgG antibodies are  present in about 69-83 percent of patients with rheumatoid  arthritis (RA) and have specificities of 93-95 percent. These  autoantibodies may be present in the preclinical phase of disease,  are associated with future RA development, and may predict  radiographic joint destruction. Patients with weak positive  results  should be monitored and testing repeated.  Performed by [facility],  [address], [facility] [place] [address] [phone]  [email or web address], Jj Sherman MD - Lab. Director     • GFR If  01/22/2019 >60  >60 mL/min/1.73 m 2 Final   • GFR If Non  01/22/2019 >60  >60 mL/min/1.73 m 2 Final   • Manual Diff Status 01/22/2019 PERFORMED   Final   • Peripheral Smear Review 01/22/2019 see below   Final    Comment: Due to instrument suspect flags, further review of peripheral smear is  indicated on this patient sample. This review may or may not result in  abnormal findings.     • Plt Estimation 01/22/2019 Normal   Final   • RBC Morphology 01/22/2019 Present   Final              Assessment/Plan:   1. Calcific tendinitis of right shoulder  She states her pain is minimal and very slightly returning, but overall, it is improved compared to before the injection. We cannot inject her shoulder again as this can only be done every 3 months. I will refer the patient to physical therapy for this issue, and she is to take OTC medications for pain.    2. Finger swelling  This issue remains mild and most likely related to her occupation. Imaging was negative for this issue. Labs did not show any evidence for inflammation or autoimmune disease. We will continue to follow her for this problem.  Positive rheumatoid factor probably false positive because the CCP antibody was negative.  We will keep an eye on this and we may have to redo her autoimmune studies because of potential that this may evolve into an autoimmune/inflammatory arthritis especially because of family history of seronegative rheumatoid arthritis.    3. Pruritus  We have done blood work to rule out metabolic or hematologic problems with negative results.  Entirely improved by taking Claritin once daily.  She does have elevation of the WBC count with mild elevation of the lymphocytes.  We will recheck the CBC today.    4. Acne, unspecified acne  type  The breakout in the face is consistent with acne.  This could be steroid induced acne.  We will give a trial of clindamycin phosphate gel apply to affected areas twice daily.  - clindamycin (CLEOCIN T) 1 % Gel; Apply to affected areas twice daily  Dispense: 1 Tube; Refill: 1    5. Metrorrhagia  We will do workup.  We will do transvaginal pelvic ultrasound, we will check blood work to rule out thyroid related problem.  We will check CBC to make sure she did not become anemic from the prolonged bleeding.  Cultures were done to rule out infection.  Consider referral to GYN for further evaluation and treatment after we get the results.  If the bleeding continues more than 4 weeks and ultrasound comes back without significant problem consider giving OCP to stop the bleeding.  - US-PELVIC TRANSVAGINAL ONLY; Future  - TSH; Future  - CBC WITH DIFFERENTIAL; Future  - VAGINAL PATHOGENS DNA PANEL; Future  - CHLAMYDIA/GC PCR URINE OR SWAB; Future      Jose Alejandro HERNANDES (Judy), am scribing for, and in the presence of, Raquel Bolanos MD     Electronically signed by: Jose Alejandro Mcbride (Staceye), 2/20/2019    IRaquel MD personally performed the services described in this documentation, as scribed by Jose Alejandro Mcbride in my presence, and it is both accurate and complete.

## 2019-02-21 PROBLEM — M75.31 CALCIFIC TENDINITIS OF RIGHT SHOULDER: Status: ACTIVE | Noted: 2019-02-21

## 2019-02-21 LAB
C TRACH DNA SPEC QL NAA+PROBE: NEGATIVE
CANDIDA DNA VAG QL PROBE+SIG AMP: NEGATIVE
G VAGINALIS DNA VAG QL PROBE+SIG AMP: NEGATIVE
N GONORRHOEA DNA SPEC QL NAA+PROBE: NEGATIVE
SPECIMEN SOURCE: NORMAL
T VAGINALIS DNA VAG QL PROBE+SIG AMP: NEGATIVE

## 2019-02-25 ENCOUNTER — HOSPITAL ENCOUNTER (OUTPATIENT)
Dept: LAB | Facility: MEDICAL CENTER | Age: 44
End: 2019-02-25
Attending: FAMILY MEDICINE
Payer: COMMERCIAL

## 2019-02-25 DIAGNOSIS — N92.1 METRORRHAGIA: ICD-10-CM

## 2019-02-25 LAB
BASOPHILS # BLD AUTO: 0.7 % (ref 0–1.8)
BASOPHILS # BLD: 0.07 K/UL (ref 0–0.12)
EOSINOPHIL # BLD AUTO: 0.15 K/UL (ref 0–0.51)
EOSINOPHIL NFR BLD: 1.6 % (ref 0–6.9)
ERYTHROCYTE [DISTWIDTH] IN BLOOD BY AUTOMATED COUNT: 47.5 FL (ref 35.9–50)
HCT VFR BLD AUTO: 42.7 % (ref 37–47)
HGB BLD-MCNC: 13.8 G/DL (ref 12–16)
IMM GRANULOCYTES # BLD AUTO: 0.04 K/UL (ref 0–0.11)
IMM GRANULOCYTES NFR BLD AUTO: 0.4 % (ref 0–0.9)
LYMPHOCYTES # BLD AUTO: 3.8 K/UL (ref 1–4.8)
LYMPHOCYTES NFR BLD: 40.2 % (ref 22–41)
MCH RBC QN AUTO: 29.5 PG (ref 27–33)
MCHC RBC AUTO-ENTMCNC: 32.3 G/DL (ref 33.6–35)
MCV RBC AUTO: 91.2 FL (ref 81.4–97.8)
MONOCYTES # BLD AUTO: 0.65 K/UL (ref 0–0.85)
MONOCYTES NFR BLD AUTO: 6.9 % (ref 0–13.4)
NEUTROPHILS # BLD AUTO: 4.75 K/UL (ref 2–7.15)
NEUTROPHILS NFR BLD: 50.2 % (ref 44–72)
NRBC # BLD AUTO: 0 K/UL
NRBC BLD-RTO: 0 /100 WBC
PLATELET # BLD AUTO: 458 K/UL (ref 164–446)
PMV BLD AUTO: 8.9 FL (ref 9–12.9)
RBC # BLD AUTO: 4.68 M/UL (ref 4.2–5.4)
TSH SERPL DL<=0.005 MIU/L-ACNC: 1.25 UIU/ML (ref 0.38–5.33)
WBC # BLD AUTO: 9.5 K/UL (ref 4.8–10.8)

## 2019-02-25 PROCEDURE — 85025 COMPLETE CBC W/AUTO DIFF WBC: CPT

## 2019-02-25 PROCEDURE — 36415 COLL VENOUS BLD VENIPUNCTURE: CPT

## 2019-02-25 PROCEDURE — 84443 ASSAY THYROID STIM HORMONE: CPT

## 2019-03-04 ENCOUNTER — HOSPITAL ENCOUNTER (OUTPATIENT)
Dept: RADIOLOGY | Facility: MEDICAL CENTER | Age: 44
End: 2019-03-04
Attending: FAMILY MEDICINE
Payer: COMMERCIAL

## 2019-03-04 DIAGNOSIS — N92.1 METRORRHAGIA: ICD-10-CM

## 2019-03-04 PROCEDURE — 76830 TRANSVAGINAL US NON-OB: CPT

## 2019-03-25 DIAGNOSIS — E11.9 DIABETES MELLITUS WITHOUT COMPLICATION (HCC): ICD-10-CM

## 2019-03-25 RX ORDER — METFORMIN HYDROCHLORIDE 500 MG/1
TABLET, EXTENDED RELEASE ORAL
Qty: 90 TAB | Refills: 3 | Status: SHIPPED | OUTPATIENT
Start: 2019-03-25 | End: 2019-05-06 | Stop reason: SDUPTHER

## 2019-04-29 ENCOUNTER — APPOINTMENT (OUTPATIENT)
Dept: MEDICAL GROUP | Facility: PHYSICIAN GROUP | Age: 44
End: 2019-04-29
Payer: COMMERCIAL

## 2019-05-03 ENCOUNTER — HOSPITAL ENCOUNTER (OUTPATIENT)
Dept: LAB | Facility: MEDICAL CENTER | Age: 44
End: 2019-05-03
Attending: FAMILY MEDICINE
Payer: COMMERCIAL

## 2019-05-03 DIAGNOSIS — E55.9 VITAMIN D DEFICIENCY: ICD-10-CM

## 2019-05-03 DIAGNOSIS — E11.9 DIABETES MELLITUS WITHOUT COMPLICATION (HCC): ICD-10-CM

## 2019-05-03 DIAGNOSIS — D72.829 LEUKOCYTOSIS, UNSPECIFIED TYPE: ICD-10-CM

## 2019-05-03 DIAGNOSIS — E78.5 DYSLIPIDEMIA: ICD-10-CM

## 2019-05-03 DIAGNOSIS — I10 ESSENTIAL HYPERTENSION: ICD-10-CM

## 2019-05-03 LAB
25(OH)D3 SERPL-MCNC: 47 NG/ML (ref 30–100)
ALBUMIN SERPL BCP-MCNC: 4.2 G/DL (ref 3.2–4.9)
ALBUMIN/GLOB SERPL: 1.2 G/DL
ALP SERPL-CCNC: 41 U/L (ref 30–99)
ALT SERPL-CCNC: 32 U/L (ref 2–50)
ANION GAP SERPL CALC-SCNC: 7 MMOL/L (ref 0–11.9)
AST SERPL-CCNC: 20 U/L (ref 12–45)
BASOPHILS # BLD AUTO: 0.6 % (ref 0–1.8)
BASOPHILS # BLD: 0.05 K/UL (ref 0–0.12)
BILIRUB SERPL-MCNC: 0.5 MG/DL (ref 0.1–1.5)
BUN SERPL-MCNC: 17 MG/DL (ref 8–22)
CALCIUM SERPL-MCNC: 9.4 MG/DL (ref 8.5–10.5)
CHLORIDE SERPL-SCNC: 101 MMOL/L (ref 96–112)
CHOLEST SERPL-MCNC: 170 MG/DL (ref 100–199)
CO2 SERPL-SCNC: 28 MMOL/L (ref 20–33)
CREAT SERPL-MCNC: 0.72 MG/DL (ref 0.5–1.4)
EOSINOPHIL # BLD AUTO: 0.26 K/UL (ref 0–0.51)
EOSINOPHIL NFR BLD: 3.3 % (ref 0–6.9)
ERYTHROCYTE [DISTWIDTH] IN BLOOD BY AUTOMATED COUNT: 47.6 FL (ref 35.9–50)
EST. AVERAGE GLUCOSE BLD GHB EST-MCNC: 166 MG/DL
FASTING STATUS PATIENT QL REPORTED: NORMAL
GLOBULIN SER CALC-MCNC: 3.4 G/DL (ref 1.9–3.5)
GLUCOSE SERPL-MCNC: 165 MG/DL (ref 65–99)
HBA1C MFR BLD: 7.4 % (ref 0–5.6)
HCT VFR BLD AUTO: 45.1 % (ref 37–47)
HDLC SERPL-MCNC: 23 MG/DL
HGB BLD-MCNC: 14.7 G/DL (ref 12–16)
IMM GRANULOCYTES # BLD AUTO: 0.03 K/UL (ref 0–0.11)
IMM GRANULOCYTES NFR BLD AUTO: 0.4 % (ref 0–0.9)
LDLC SERPL CALC-MCNC: ABNORMAL MG/DL
LYMPHOCYTES # BLD AUTO: 2.68 K/UL (ref 1–4.8)
LYMPHOCYTES NFR BLD: 34.3 % (ref 22–41)
MCH RBC QN AUTO: 28.5 PG (ref 27–33)
MCHC RBC AUTO-ENTMCNC: 32.6 G/DL (ref 33.6–35)
MCV RBC AUTO: 87.6 FL (ref 81.4–97.8)
MONOCYTES # BLD AUTO: 0.7 K/UL (ref 0–0.85)
MONOCYTES NFR BLD AUTO: 9 % (ref 0–13.4)
NEUTROPHILS # BLD AUTO: 4.1 K/UL (ref 2–7.15)
NEUTROPHILS NFR BLD: 52.4 % (ref 44–72)
NRBC # BLD AUTO: 0 K/UL
NRBC BLD-RTO: 0 /100 WBC
PLATELET # BLD AUTO: 410 K/UL (ref 164–446)
PMV BLD AUTO: 9.4 FL (ref 9–12.9)
POTASSIUM SERPL-SCNC: 4.2 MMOL/L (ref 3.6–5.5)
PROT SERPL-MCNC: 7.6 G/DL (ref 6–8.2)
RBC # BLD AUTO: 5.15 M/UL (ref 4.2–5.4)
SODIUM SERPL-SCNC: 136 MMOL/L (ref 135–145)
TRIGL SERPL-MCNC: 493 MG/DL (ref 0–149)
WBC # BLD AUTO: 7.8 K/UL (ref 4.8–10.8)

## 2019-05-03 PROCEDURE — 83036 HEMOGLOBIN GLYCOSYLATED A1C: CPT

## 2019-05-03 PROCEDURE — 36415 COLL VENOUS BLD VENIPUNCTURE: CPT

## 2019-05-03 PROCEDURE — 80053 COMPREHEN METABOLIC PANEL: CPT

## 2019-05-03 PROCEDURE — 80061 LIPID PANEL: CPT

## 2019-05-03 PROCEDURE — 82306 VITAMIN D 25 HYDROXY: CPT

## 2019-05-03 PROCEDURE — 85025 COMPLETE CBC W/AUTO DIFF WBC: CPT

## 2019-05-06 ENCOUNTER — OFFICE VISIT (OUTPATIENT)
Dept: MEDICAL GROUP | Facility: PHYSICIAN GROUP | Age: 44
End: 2019-05-06
Payer: COMMERCIAL

## 2019-05-06 VITALS
WEIGHT: 157.85 LBS | BODY MASS INDEX: 26.3 KG/M2 | HEART RATE: 96 BPM | DIASTOLIC BLOOD PRESSURE: 70 MMHG | OXYGEN SATURATION: 98 % | TEMPERATURE: 98.7 F | SYSTOLIC BLOOD PRESSURE: 110 MMHG | HEIGHT: 65 IN

## 2019-05-06 DIAGNOSIS — E78.5 DYSLIPIDEMIA: ICD-10-CM

## 2019-05-06 DIAGNOSIS — I10 ESSENTIAL HYPERTENSION: ICD-10-CM

## 2019-05-06 DIAGNOSIS — E55.9 VITAMIN D DEFICIENCY: ICD-10-CM

## 2019-05-06 PROCEDURE — 99214 OFFICE O/P EST MOD 30 MIN: CPT | Performed by: FAMILY MEDICINE

## 2019-05-06 RX ORDER — FENOFIBRATE 145 MG/1
TABLET, COATED ORAL
Qty: 90 TAB | Refills: 1 | Status: SHIPPED | OUTPATIENT
Start: 2019-05-06 | End: 2019-12-16 | Stop reason: SDUPTHER

## 2019-05-06 RX ORDER — LOSARTAN POTASSIUM AND HYDROCHLOROTHIAZIDE 12.5; 1 MG/1; MG/1
TABLET ORAL
Qty: 90 TAB | Refills: 1 | Status: SHIPPED | OUTPATIENT
Start: 2019-05-06 | End: 2019-12-16 | Stop reason: SDUPTHER

## 2019-05-06 RX ORDER — METFORMIN HYDROCHLORIDE 500 MG/1
1000 TABLET, EXTENDED RELEASE ORAL EVERY EVENING
Qty: 180 TAB | Refills: 1 | Status: SHIPPED | OUTPATIENT
Start: 2019-05-06 | End: 2019-12-16 | Stop reason: SDUPTHER

## 2019-05-06 NOTE — PROGRESS NOTES
"Subjective:      Ita Johnson is a 43 y.o. female who presents for follow-up.    HPI:    Patient presents today for follow-up of her chronic medical problems.    Hypertension  She takes Losartan/HCTZ 100-12.5 mg for her hypertension without any side effects. Blood pressure levels in the office are within goal.     Diabetes Mellitus  She continues to take Metformin  mg 1 tablet daily for diabetes mellitus type 2 without any problems or side effects. She is due for another eye and foot exam. Blood work was completed prior to this visit.     Dyslipidemia  She has been taking Fenofibrate 145 mg as prescribed for her dyslipidemia without myalgias or other side effects. Blood work was done before this visit.    Vitamin D deficiency  She takes 4000 IU's of OTC Vitamin D supplementation. Blood work was completed prior to this visit.      Past medical history, past surgical history, family history reviewed-no changes    Social history reviewed-no changes    Allergies reviewed-no changes    Medications reviewed-no changes       ROS:  As per the HPI as shown above, the rest are negative.       Objective:     /70 (BP Location: Left arm, Patient Position: Sitting, BP Cuff Size: Adult)   Pulse 96   Temp 37.1 °C (98.7 °F) (Temporal)   Ht 1.651 m (5' 5\")   Wt 71.6 kg (157 lb 13.6 oz)   SpO2 98%   BMI 26.27 kg/m²     Physical Exam    Examined alert, awake, oriented, not in distress    Neck-supple, no lymphadenopathy, slight thyromegaly which is consistent with prior findings  Lungs-clear to auscultation, no rales, no wheezes  Heart-regular rate and rhythm, no murmur  Extremities-no edema, clubbing, cyanosis     Monofilament testing with a 10 gram force: sensation intact: intact bilaterally  Visual Inspection: Feet without maceration, ulcers, fissures.  Pedal pulses: intact bilaterally     Labs:  Hospital Outpatient Visit on 05/03/2019   Component Date Value Ref Range Status   • Cholesterol,Tot 05/03/2019 170  " 100 - 199 mg/dL Final   • Triglycerides 05/03/2019 493* 0 - 149 mg/dL Final   • HDL 05/03/2019 23* >=40 mg/dL Final   • LDL 05/03/2019 see below  <100 mg/dL Final    Comment: The calculated LDL value is invalid due to the triglyceride  value of >400 mg/dL.     • Sodium 05/03/2019 136  135 - 145 mmol/L Final   • Potassium 05/03/2019 4.2  3.6 - 5.5 mmol/L Final   • Chloride 05/03/2019 101  96 - 112 mmol/L Final   • Co2 05/03/2019 28  20 - 33 mmol/L Final   • Anion Gap 05/03/2019 7.0  0.0 - 11.9 Final   • Glucose 05/03/2019 165* 65 - 99 mg/dL Final   • Bun 05/03/2019 17  8 - 22 mg/dL Final   • Creatinine 05/03/2019 0.72  0.50 - 1.40 mg/dL Final   • Calcium 05/03/2019 9.4  8.5 - 10.5 mg/dL Final   • AST(SGOT) 05/03/2019 20  12 - 45 U/L Final   • ALT(SGPT) 05/03/2019 32  2 - 50 U/L Final   • Alkaline Phosphatase 05/03/2019 41  30 - 99 U/L Final   • Total Bilirubin 05/03/2019 0.5  0.1 - 1.5 mg/dL Final   • Albumin 05/03/2019 4.2  3.2 - 4.9 g/dL Final   • Total Protein 05/03/2019 7.6  6.0 - 8.2 g/dL Final   • Globulin 05/03/2019 3.4  1.9 - 3.5 g/dL Final   • A-G Ratio 05/03/2019 1.2  g/dL Final   • Glycohemoglobin 05/03/2019 7.4* 0.0 - 5.6 % Final    Comment: Increased risk for diabetes:  5.7 -6.4%  Diabetes:  >6.4%  Glycemic control for adults with diabetes:  <7.0%  The above interpretations are per ADA guidelines.  Diagnosis  of diabetes mellitus on the basis of elevated Hemoglobin A1c  should be confirmed by repeating the Hb A1c test.     • Est Avg Glucose 05/03/2019 166  mg/dL Final    Comment: The eAG calculation is based on the A1c-Derived Daily Glucose  (ADAG) study.  See the ADA's website for additional information.     • 25-Hydroxy   Vitamin D 25 05/03/2019 47  30 - 100 ng/mL Final    Comment: Adult Ranges:   <20 ng/mL - Deficiency  20-29 ng/mL - Insufficiency   ng/mL - Sufficiency  The [employer] Vitamin D Assay is standardized to the  [facility] reference measurement procedures, a  reference  method for the Vitamin D Standardization Program  (VDSP).  The VDSP aligns patient results among 25 (OH)  Vitamin D methods.     • WBC 05/03/2019 7.8  4.8 - 10.8 K/uL Final   • RBC 05/03/2019 5.15  4.20 - 5.40 M/uL Final   • Hemoglobin 05/03/2019 14.7  12.0 - 16.0 g/dL Final   • Hematocrit 05/03/2019 45.1  37.0 - 47.0 % Final   • MCV 05/03/2019 87.6  81.4 - 97.8 fL Final   • MCH 05/03/2019 28.5  27.0 - 33.0 pg Final   • MCHC 05/03/2019 32.6* 33.6 - 35.0 g/dL Final   • RDW 05/03/2019 47.6  35.9 - 50.0 fL Final   • Platelet Count 05/03/2019 410  164 - 446 K/uL Final   • MPV 05/03/2019 9.4  9.0 - 12.9 fL Final   • Neutrophils-Polys 05/03/2019 52.40  44.00 - 72.00 % Final   • Lymphocytes 05/03/2019 34.30  22.00 - 41.00 % Final   • Monocytes 05/03/2019 9.00  0.00 - 13.40 % Final   • Eosinophils 05/03/2019 3.30  0.00 - 6.90 % Final   • Basophils 05/03/2019 0.60  0.00 - 1.80 % Final   • Immature Granulocytes 05/03/2019 0.40  0.00 - 0.90 % Final   • Nucleated RBC 05/03/2019 0.00  /100 WBC Final   • Neutrophils (Absolute) 05/03/2019 4.10  2.00 - 7.15 K/uL Final    Includes immature neutrophils, if present.   • Lymphs (Absolute) 05/03/2019 2.68  1.00 - 4.80 K/uL Final   • Monos (Absolute) 05/03/2019 0.70  0.00 - 0.85 K/uL Final   • Eos (Absolute) 05/03/2019 0.26  0.00 - 0.51 K/uL Final   • Baso (Absolute) 05/03/2019 0.05  0.00 - 0.12 K/uL Final   • Immature Granulocytes (abs) 05/03/2019 0.03  0.00 - 0.11 K/uL Final   • NRBC (Absolute) 05/03/2019 0.00  K/uL Final   • Fasting Status 05/03/2019 Fasting   Final   • GFR If  05/03/2019 >60  >60 mL/min/1.73 m 2 Final   • GFR If Non  05/03/2019 >60  >60 mL/min/1.73 m 2 Final        Assessment/Plan:     1. Uncontrolled diabetes mellitus type 2 without complications (HCC)  Blood glucose levels are greatly increased from 106 to 165. A1C has also increased from 6.9 to 7.4 which is greater than her goal of below 7.0. Due to this recent increase, I will be  doubling her Metformin XR dosage to 500 mg 2 tablets every evening. I advised her to make diet changes to improve her glucose levels. We discussed the plate method, and she was provided with an information sheet. Advised her to avoid sweets, decrease her carbohydrate intake, exercise regularly and keep a healthy weight. Monofilament exam was performed today which was within normal limits.  - metFORMIN ER (GLUCOPHAGE XR) 500 MG TABLET SR 24 HR; Take 2 Tabs by mouth every evening.  Dispense: 180 Tab; Refill: 1  - Diabetic Monofilament Lower Extremity Exam  - Lipid Profile; Future  - Comp Metabolic Panel; Future  - HEMOGLOBIN A1C; Future    2. Essential hypertension  Blood pressure is stable and within goal on current medications. We will continue the current plan of care. I have advised her to avoid salty foods and exercise regularly. Her weight has decreased by 1 pound since her last visit.  - losartan-hydrochlorothiazide (HYZAAR) 100-12.5 MG per tablet; TAKE 1 TABLET BY MOUTH ONCE DAILY  Dispense: 90 Tab; Refill: 1  - Lipid Profile; Future  - Comp Metabolic Panel; Future  - HEMOGLOBIN A1C; Future    3. Dyslipidemia  Lipid panel shows a significant increase in her triglycerides from 198 to 493; this is expected as her diabetes is uncontrolled. Her total cholesterol remains within goal at 170. HDL remains below target at 23. LDL could not be calculated due to her triglyceride levels, but she has historically had LDL levels below 60 from 2015. We are unable to increase her Fenofibrate dosage at it is already at maximum. She is to start taking OTC fish oil supplements 1000 mg BID. I have also advised the patient to increase her exercise regimen and avoid fatty foods. Labs have been ordered for the next follow up visit.   - fenofibrate (TRICOR) 145 MG Tab; TAKE 1 TABLET BY MOUTH ONCE DAILY  Dispense: 90 Tab; Refill: 1  - Lipid Profile; Future  - Comp Metabolic Panel; Future  - HEMOGLOBIN A1C; Future    4. Vitamin D  deficiency  Patient's Vitamin D level is within the target range at 47. Advised to continue current OTC supplementation of 4000 IU's daily.       IJose Alejandro (Scribe), am scribing for, and in the presence of, Raquel Bolanos MD     Electronically signed by: Jose Alejandro Mcbride (Scribe), 5/6/2019    IRaquel MD personally performed the services described in this documentation, as scribed by Jose Alejandro Mcbride in my presence, and it is both accurate and complete.

## 2019-08-05 ENCOUNTER — HOSPITAL ENCOUNTER (OUTPATIENT)
Dept: LAB | Facility: MEDICAL CENTER | Age: 44
End: 2019-08-05
Attending: FAMILY MEDICINE
Payer: COMMERCIAL

## 2019-08-05 DIAGNOSIS — E78.5 DYSLIPIDEMIA: ICD-10-CM

## 2019-08-05 DIAGNOSIS — I10 ESSENTIAL HYPERTENSION: ICD-10-CM

## 2019-08-05 LAB
ALBUMIN SERPL BCP-MCNC: 4.1 G/DL (ref 3.2–4.9)
ALBUMIN/GLOB SERPL: 1.2 G/DL
ALP SERPL-CCNC: 40 U/L (ref 30–99)
ALT SERPL-CCNC: 21 U/L (ref 2–50)
ANION GAP SERPL CALC-SCNC: 12 MMOL/L (ref 0–11.9)
AST SERPL-CCNC: 17 U/L (ref 12–45)
BILIRUB SERPL-MCNC: 0.4 MG/DL (ref 0.1–1.5)
BUN SERPL-MCNC: 18 MG/DL (ref 8–22)
CALCIUM SERPL-MCNC: 9 MG/DL (ref 8.5–10.5)
CHLORIDE SERPL-SCNC: 104 MMOL/L (ref 96–112)
CHOLEST SERPL-MCNC: 125 MG/DL (ref 100–199)
CO2 SERPL-SCNC: 24 MMOL/L (ref 20–33)
CREAT SERPL-MCNC: 0.7 MG/DL (ref 0.5–1.4)
EST. AVERAGE GLUCOSE BLD GHB EST-MCNC: 143 MG/DL
GLOBULIN SER CALC-MCNC: 3.5 G/DL (ref 1.9–3.5)
GLUCOSE SERPL-MCNC: 120 MG/DL (ref 65–99)
HBA1C MFR BLD: 6.6 % (ref 0–5.6)
HDLC SERPL-MCNC: 21 MG/DL
LDLC SERPL CALC-MCNC: 47 MG/DL
POTASSIUM SERPL-SCNC: 3.8 MMOL/L (ref 3.6–5.5)
PROT SERPL-MCNC: 7.6 G/DL (ref 6–8.2)
SODIUM SERPL-SCNC: 140 MMOL/L (ref 135–145)
TRIGL SERPL-MCNC: 284 MG/DL (ref 0–149)

## 2019-08-05 PROCEDURE — 83036 HEMOGLOBIN GLYCOSYLATED A1C: CPT

## 2019-08-05 PROCEDURE — 36415 COLL VENOUS BLD VENIPUNCTURE: CPT

## 2019-08-05 PROCEDURE — 80061 LIPID PANEL: CPT

## 2019-08-05 PROCEDURE — 80053 COMPREHEN METABOLIC PANEL: CPT

## 2019-08-12 ENCOUNTER — OFFICE VISIT (OUTPATIENT)
Dept: MEDICAL GROUP | Facility: PHYSICIAN GROUP | Age: 44
End: 2019-08-12
Payer: COMMERCIAL

## 2019-08-12 VITALS
HEIGHT: 65 IN | OXYGEN SATURATION: 97 % | SYSTOLIC BLOOD PRESSURE: 110 MMHG | WEIGHT: 154.1 LBS | TEMPERATURE: 97.9 F | DIASTOLIC BLOOD PRESSURE: 70 MMHG | BODY MASS INDEX: 25.67 KG/M2 | HEART RATE: 93 BPM

## 2019-08-12 DIAGNOSIS — I10 ESSENTIAL HYPERTENSION: ICD-10-CM

## 2019-08-12 DIAGNOSIS — E55.9 VITAMIN D DEFICIENCY: ICD-10-CM

## 2019-08-12 DIAGNOSIS — E11.9 DIABETES MELLITUS WITHOUT COMPLICATION (HCC): ICD-10-CM

## 2019-08-12 DIAGNOSIS — L70.9 ACNE, UNSPECIFIED ACNE TYPE: ICD-10-CM

## 2019-08-12 DIAGNOSIS — Z92.29 HISTORY OF HEPATITIS B VACCINATION: ICD-10-CM

## 2019-08-12 DIAGNOSIS — E78.5 DYSLIPIDEMIA: ICD-10-CM

## 2019-08-12 PROCEDURE — 99214 OFFICE O/P EST MOD 30 MIN: CPT | Performed by: FAMILY MEDICINE

## 2019-08-12 RX ORDER — CLINDAMYCIN PHOSPHATE 10 MG/G
GEL TOPICAL
Qty: 1 TUBE | Refills: 1 | Status: SHIPPED | OUTPATIENT
Start: 2019-08-12 | End: 2019-12-16

## 2019-08-12 NOTE — PROGRESS NOTES
Subjective:      Ita Johnson is a 43 y.o. female who presents with Diabetes        HPI:  The patient presents for a 3 month follow up on her chronic medical problems.     Acne, Unspecified  The patient is still breaking out in her jaw area. This was a new issue in February 2019 after receiving a steroid injection. At that time I prescribed her Clindamycin 1% gel, which she has been using as directed. She is requesting I prescribe her a different facial cream for her acne breakouts, because she does not think the clindamycin cream is working. She states her acne has never fully resolved since it began.     Diabetes Mellitus  The patient is currently taking two Metformin  mg tabs nightly without side effects. We increased her dose from 500 mg to 1000 mg because her A1c was uncontrolled at 7.4. Her A1c on 8/5/19 was 6.6 with a blood glucose of 120. The patient had a retinal exam in June 2019, which showed no retinopathy. She has lost 3 lbs since her last visit in May, and states she is no longer drinking wine at night. She has been eating protein fruit smoothies for breakfast and dinner, and is eating salad with protein for lunch. She checks her blood sugar infrequently at home, and it ranges from 160-180 after work before she eats dinner.  When she checks her blood sugar after a meal it is in the low 200s.  She does not check it in the morning after fasting overnight.     Essential Hypertension  The patient is currently taking Losartan-HCTZ 100-12.5 mg daily without side effects. her blood pressure today is within range at 110/70.     Dyslipidemia   We added fish oil tablets 1000 mg 1 tablet twice a day to her fenofibrate 145 mg daily because her triglycerides increased to 495, 3 months ago.  She has been taking both medications without side effects. Lipid panel from 8/5/19 shows improved triglycerides from 495 to 284 and HDL is still low at 21.  LDL is at target at 47.    Vitamin D Deficiency  The patient  "is currently taking 4000 IU of vitamin D daily. Vitamin D was 47 and at goal in May.     Health Maintenance  The patient thinks she had a Hepatitis B vaccine when she migrated to [place] from the [facility] when she was young, however she is unsure and does not have the records. I recommended we test for Hepatitis B immunity through blood work, given her diagnosis of diabetes.       Past medical history, past surgical history, family history reviewed-no changes    Social history reviewed-no changes    Allergies reviewed-no changes    Medications reviewed-no changes      ROS:  As per the HPI as shown above, the rest are negative.       Objective:     /70 (BP Location: Left arm, Patient Position: Sitting, BP Cuff Size: Adult)   Pulse 93   Temp 36.6 °C (97.9 °F) (Temporal)   Ht 1.651 m (5' 5\")   Wt 69.9 kg (154 lb 1.6 oz)   SpO2 97%   BMI 25.64 kg/m²     Physical Exam  Examined alert, awake, oriented, not in distress    Neck-supple, no lymphadenopathy, no thyromegaly  Lungs-clear to auscultation, no rales, no wheezes  Heart-regular rate and rhythm, no murmur  Extremities-no edema, clubbing, cyanosis  Skin- erythematous papules consistent with acne on the right mandibular area       Labs:  Hospital Outpatient Visit on 08/05/2019   Component Date Value Ref Range Status   • Glycohemoglobin 08/05/2019 6.6* 0.0 - 5.6 % Final    Comment: Increased risk for diabetes:  5.7 -6.4%  Diabetes:  >6.4%  Glycemic control for adults with diabetes:  <7.0%  The above interpretations are per ADA guidelines.  Diagnosis  of diabetes mellitus on the basis of elevated Hemoglobin A1c  should be confirmed by repeating the Hb A1c test.     • Est Avg Glucose 08/05/2019 143  mg/dL Final    Comment: The eAG calculation is based on the A1c-Derived Daily Glucose  (ADAG) study.  See the ADA's website for additional information.     • Sodium 08/05/2019 140  135 - 145 mmol/L Final   • Potassium 08/05/2019 3.8  3.6 - 5.5 mmol/L Final   • " Chloride 08/05/2019 104  96 - 112 mmol/L Final   • Co2 08/05/2019 24  20 - 33 mmol/L Final   • Anion Gap 08/05/2019 12.0* 0.0 - 11.9 Final   • Glucose 08/05/2019 120* 65 - 99 mg/dL Final   • Bun 08/05/2019 18  8 - 22 mg/dL Final   • Creatinine 08/05/2019 0.70  0.50 - 1.40 mg/dL Final   • Calcium 08/05/2019 9.0  8.5 - 10.5 mg/dL Final   • AST(SGOT) 08/05/2019 17  12 - 45 U/L Final   • ALT(SGPT) 08/05/2019 21  2 - 50 U/L Final   • Alkaline Phosphatase 08/05/2019 40  30 - 99 U/L Final   • Total Bilirubin 08/05/2019 0.4  0.1 - 1.5 mg/dL Final   • Albumin 08/05/2019 4.1  3.2 - 4.9 g/dL Final   • Total Protein 08/05/2019 7.6  6.0 - 8.2 g/dL Final   • Globulin 08/05/2019 3.5  1.9 - 3.5 g/dL Final   • A-G Ratio 08/05/2019 1.2  g/dL Final   • Cholesterol,Tot 08/05/2019 125  100 - 199 mg/dL Final   • Triglycerides 08/05/2019 284* 0 - 149 mg/dL Final   • HDL 08/05/2019 21* >=40 mg/dL Final   • LDL 08/05/2019 47  <100 mg/dL Final   • GFR If  08/05/2019 >60  >60 mL/min/1.73 m 2 Final   • GFR If Non  08/05/2019 >60  >60 mL/min/1.73 m 2 Final        Assessment/Plan:     1. Diabetes mellitus without complication (HCC)  The patient's A1lc was uncontrolled at 7.4 in May, so we increased her Metformin XR from 500 mg to 1000 mg nightly. Her A1c is now controlled at 6.6, and her blood glucose has improved from 165 to 120. Liver and kidneys are functioning well. I advised to make diet changes to improve the glucose levels. Advised to avoid sweets, decrease the carbs, exercise regularly and keep a healthy weight. I instructed her to begin checking her blood sugar first thing in the morning before she eats, and that the goal is <150.   - Lipid Profile; Future  - Comp Metabolic Panel; Future  - HEMOGLOBIN A1C; Future  - HEP B SURFACE ANTIGEN; Future  - HEP B SURFACE AB; Future  - MICROALBUMIN CREAT RATIO URINE; Future    2. Essential hypertension  Well controlled on Losartan-HCTZ. Continue taking as  prescribed.   - Lipid Profile; Future  - Comp Metabolic Panel; Future  - HEMOGLOBIN A1C; Future  - HEP B SURFACE ANTIGEN; Future  - HEP B SURFACE AB; Future  - MICROALBUMIN CREAT RATIO URINE; Future    3. Dyslipidemia  The patient's LDL has greatly improved from 493 in May to 284 after adding Fenofibrate and Fish Oil to her daily regimen. Her HDL is very low at 21. I instructed her to increase her level of exercise and to eat more fish and vegetables. Continue taking Fenofibrate and Fish Oil as prescribed.   - Lipid Profile; Future  - Comp Metabolic Panel; Future  - HEMOGLOBIN A1C; Future  - HEP B SURFACE ANTIGEN; Future  - HEP B SURFACE AB; Future  - MICROALBUMIN CREAT RATIO URINE; Future    4. Vitamin D deficiency  Well controlled on 4000 IU of vitamin D. Continue taking as directed.   - Lipid Profile; Future  - Comp Metabolic Panel; Future  - HEMOGLOBIN A1C; Future  - HEP B SURFACE ANTIGEN; Future  - HEP B SURFACE AB; Future  - MICROALBUMIN CREAT RATIO URINE; Future    5. History of hepatitis B vaccination  The patient thinks she received the Hepatitis B vaccine when she immigrated here from the [facility], however she does not have the records. We will check her immunity with blood work. If she does not have enough immunity we will vaccinate her, due to her history of diabetes.   - Lipid Profile; Future  - Comp Metabolic Panel; Future  - HEMOGLOBIN A1C; Future  - HEP B SURFACE ANTIGEN; Future  - HEP B SURFACE AB; Future  - MICROALBUMIN CREAT RATIO URINE; Future    6. Acne, unspecified acne type  I instructed her to try mixing the clindamycin gel with benzoyl peroxide gel and applying it together. If it does not clear up her acne she will contact me and I will try her on another medication probably topical retinoid.   - clindamycin (CLEOCIN T) 1 % Gel; Apply to affected areas twice daily  Dispense: 1 Tube; Refill: 1  - Benzoyl Peroxide 5.5 % Gel; 1 Application by Apply externally route 2 times a day.  Dispense:  1 Bottle; Refill: 2      Follow up with me in 4 months.       I, Twyla Hurtado (Scribe), am scribing for, and in the presence of, Raquel Bolanos MD     Electronically signed by: Twyla Hurtado (Scribe), 8/12/2019    IRaquel MD personally performed the services described in this documentation, as scribed by Twyla Hurtado in my presence, and it is both accurate and complete.

## 2019-09-02 DIAGNOSIS — I10 ESSENTIAL HYPERTENSION: ICD-10-CM

## 2019-09-02 DIAGNOSIS — E78.5 DYSLIPIDEMIA: ICD-10-CM

## 2019-09-03 RX ORDER — FENOFIBRATE 145 MG/1
TABLET, COATED ORAL
Qty: 30 TAB | Refills: 5 | Status: SHIPPED | OUTPATIENT
Start: 2019-09-03 | End: 2019-12-16

## 2019-09-03 RX ORDER — LOSARTAN POTASSIUM AND HYDROCHLOROTHIAZIDE 12.5; 1 MG/1; MG/1
TABLET ORAL
Qty: 30 TAB | Refills: 5 | Status: SHIPPED | OUTPATIENT
Start: 2019-09-03 | End: 2019-12-16

## 2019-12-11 ENCOUNTER — HOSPITAL ENCOUNTER (OUTPATIENT)
Dept: LAB | Facility: MEDICAL CENTER | Age: 44
End: 2019-12-11
Attending: FAMILY MEDICINE
Payer: COMMERCIAL

## 2019-12-11 DIAGNOSIS — E55.9 VITAMIN D DEFICIENCY: ICD-10-CM

## 2019-12-11 DIAGNOSIS — I10 ESSENTIAL HYPERTENSION: ICD-10-CM

## 2019-12-11 DIAGNOSIS — E78.5 DYSLIPIDEMIA: ICD-10-CM

## 2019-12-11 DIAGNOSIS — Z92.29 HISTORY OF HEPATITIS B VACCINATION: ICD-10-CM

## 2019-12-11 DIAGNOSIS — E11.9 DIABETES MELLITUS WITHOUT COMPLICATION (HCC): ICD-10-CM

## 2019-12-11 LAB
ALBUMIN SERPL BCP-MCNC: 4.4 G/DL (ref 3.2–4.9)
ALBUMIN/GLOB SERPL: 1.3 G/DL
ALP SERPL-CCNC: 44 U/L (ref 30–99)
ALT SERPL-CCNC: 27 U/L (ref 2–50)
ANION GAP SERPL CALC-SCNC: 9 MMOL/L (ref 0–11.9)
AST SERPL-CCNC: 18 U/L (ref 12–45)
BILIRUB SERPL-MCNC: 0.2 MG/DL (ref 0.1–1.5)
BUN SERPL-MCNC: 19 MG/DL (ref 8–22)
CALCIUM SERPL-MCNC: 9.8 MG/DL (ref 8.5–10.5)
CHLORIDE SERPL-SCNC: 103 MMOL/L (ref 96–112)
CHOLEST SERPL-MCNC: 114 MG/DL (ref 100–199)
CO2 SERPL-SCNC: 26 MMOL/L (ref 20–33)
CREAT SERPL-MCNC: 0.74 MG/DL (ref 0.5–1.4)
CREAT UR-MCNC: 119.6 MG/DL
EST. AVERAGE GLUCOSE BLD GHB EST-MCNC: 146 MG/DL
GLOBULIN SER CALC-MCNC: 3.4 G/DL (ref 1.9–3.5)
GLUCOSE SERPL-MCNC: 130 MG/DL (ref 65–99)
HBA1C MFR BLD: 6.7 % (ref 0–5.6)
HBV SURFACE AB SERPL IA-ACNC: <3.1 MIU/ML (ref 0–10)
HBV SURFACE AG SER QL: REACTIVE
HDLC SERPL-MCNC: 26 MG/DL
LDLC SERPL CALC-MCNC: 43 MG/DL
MICROALBUMIN UR-MCNC: 0.7 MG/DL
MICROALBUMIN/CREAT UR: 6 MG/G (ref 0–30)
POTASSIUM SERPL-SCNC: 4 MMOL/L (ref 3.6–5.5)
PROT SERPL-MCNC: 7.8 G/DL (ref 6–8.2)
SODIUM SERPL-SCNC: 138 MMOL/L (ref 135–145)
TRIGL SERPL-MCNC: 223 MG/DL (ref 0–149)

## 2019-12-11 PROCEDURE — 87341 HEP B SURFACE AG NEUTRLZJ IA: CPT

## 2019-12-11 PROCEDURE — 82043 UR ALBUMIN QUANTITATIVE: CPT

## 2019-12-11 PROCEDURE — 87340 HEPATITIS B SURFACE AG IA: CPT

## 2019-12-11 PROCEDURE — 80053 COMPREHEN METABOLIC PANEL: CPT

## 2019-12-11 PROCEDURE — 82570 ASSAY OF URINE CREATININE: CPT

## 2019-12-11 PROCEDURE — 86706 HEP B SURFACE ANTIBODY: CPT

## 2019-12-11 PROCEDURE — 80061 LIPID PANEL: CPT

## 2019-12-11 PROCEDURE — 83036 HEMOGLOBIN GLYCOSYLATED A1C: CPT

## 2019-12-11 PROCEDURE — 36415 COLL VENOUS BLD VENIPUNCTURE: CPT

## 2019-12-13 LAB — HBV SURFACE AG SERPL QL NT: POSITIVE

## 2019-12-16 ENCOUNTER — OFFICE VISIT (OUTPATIENT)
Dept: MEDICAL GROUP | Facility: PHYSICIAN GROUP | Age: 44
End: 2019-12-16
Payer: COMMERCIAL

## 2019-12-16 ENCOUNTER — HOSPITAL ENCOUNTER (OUTPATIENT)
Dept: LAB | Facility: MEDICAL CENTER | Age: 44
End: 2019-12-16
Attending: FAMILY MEDICINE
Payer: COMMERCIAL

## 2019-12-16 VITALS
HEART RATE: 85 BPM | HEIGHT: 65 IN | TEMPERATURE: 97.9 F | WEIGHT: 154.1 LBS | SYSTOLIC BLOOD PRESSURE: 110 MMHG | BODY MASS INDEX: 25.67 KG/M2 | OXYGEN SATURATION: 97 % | DIASTOLIC BLOOD PRESSURE: 80 MMHG

## 2019-12-16 DIAGNOSIS — E11.9 DIABETES MELLITUS WITHOUT COMPLICATION (HCC): ICD-10-CM

## 2019-12-16 DIAGNOSIS — R76.8 HEPATITIS B SURFACE ANTIGEN POSITIVE: ICD-10-CM

## 2019-12-16 DIAGNOSIS — I10 ESSENTIAL HYPERTENSION: ICD-10-CM

## 2019-12-16 DIAGNOSIS — E78.5 DYSLIPIDEMIA: ICD-10-CM

## 2019-12-16 DIAGNOSIS — E04.2 MULTIPLE THYROID NODULES: ICD-10-CM

## 2019-12-16 DIAGNOSIS — Z23 NEED FOR IMMUNIZATION AGAINST INFLUENZA: ICD-10-CM

## 2019-12-16 DIAGNOSIS — L70.9 ACNE, UNSPECIFIED ACNE TYPE: ICD-10-CM

## 2019-12-16 LAB — HBV CORE AB SERPL QL IA: REACTIVE

## 2019-12-16 PROCEDURE — 90471 IMMUNIZATION ADMIN: CPT | Performed by: FAMILY MEDICINE

## 2019-12-16 PROCEDURE — 36415 COLL VENOUS BLD VENIPUNCTURE: CPT

## 2019-12-16 PROCEDURE — 87517 HEPATITIS B DNA QUANT: CPT

## 2019-12-16 PROCEDURE — 90686 IIV4 VACC NO PRSV 0.5 ML IM: CPT | Performed by: FAMILY MEDICINE

## 2019-12-16 PROCEDURE — 86704 HEP B CORE ANTIBODY TOTAL: CPT

## 2019-12-16 PROCEDURE — 99214 OFFICE O/P EST MOD 30 MIN: CPT | Mod: 25 | Performed by: FAMILY MEDICINE

## 2019-12-16 PROCEDURE — 87350 HEPATITIS BE AG IA: CPT

## 2019-12-16 RX ORDER — LOSARTAN POTASSIUM AND HYDROCHLOROTHIAZIDE 12.5; 1 MG/1; MG/1
TABLET ORAL
Qty: 90 TAB | Refills: 1 | Status: SHIPPED | OUTPATIENT
Start: 2019-12-16 | End: 2020-09-09

## 2019-12-16 RX ORDER — METFORMIN HYDROCHLORIDE 500 MG/1
1000 TABLET, EXTENDED RELEASE ORAL EVERY EVENING
Qty: 180 TAB | Refills: 1 | Status: SHIPPED | OUTPATIENT
Start: 2019-12-16 | End: 2020-06-09

## 2019-12-16 RX ORDER — FENOFIBRATE 145 MG/1
TABLET, COATED ORAL
Qty: 90 TAB | Refills: 1 | Status: SHIPPED | OUTPATIENT
Start: 2019-12-16 | End: 2020-09-09

## 2019-12-16 NOTE — PROGRESS NOTES
"Subjective:      Ita Johnson is a 44 y.o. female who presents with Diabetes      HPI:    The patient is here for follow up on her chronic medical problems.    Diabetes mellitus without complication (HCC)  She continues to take Metformin  mg 2 tablets QHS for diabetes mellitus type 2 without any problems or side effects. Patient is UTD on her retinal screening exam. Blood work was completed prior to this visit.      Essential Hypertension  She takes Losartan-HCTZ 100-12.5 mg for her hypertension without any side effects. Blood pressure in the office today is within goal at 110/80.      Dyslipidemia  She has been taking Fenofibrate 145 mg and fish oil 1000 mg as prescribed for her dyslipidemia without side effects. We added the fish oil 2 office visits ago and saw marked improvement in her triglycerides from 493 to 284 upon follow-up labs. Blood work was again done before this visit.     Multiple thyroid nodules  Patient has a history of thyromegaly. We have previously worked this up as well with an ultrasound in 2/2019. This returned showing small nodules bilaterally each less than 4 mm in diameter as well as borderline thyromegaly. Patient has been euthyroid.     Hepatitis B surface antigen positive  Patient completed Hepatitis B testing prior to her visit today as she was unsure about her immunization status.     Acne, unspecified acne type  I previously saw the patient for an acne break out on her jaw area. She was first using Clindamycin 1% gel, which was not successful, so we added Benzoyl peroxide. Patient states today that this was not effective. She has been using a \"whitening soap\" that she purchased online which she believes has been working well.     Health Maintenance  Patient is a requesting a flu shot today.        Past medical history, past surgical history, family history reviewed-no changes    Social history reviewed-no changes    Allergies reviewed-no changes    Medications reviewed-no " "changes      ROS:  As per the HPI as shown above, the rest are negative.       Objective:     /80 (BP Location: Left arm, Patient Position: Sitting, BP Cuff Size: Adult)   Pulse 85   Temp 36.6 °C (97.9 °F) (Temporal)   Ht 1.651 m (5' 5\")   Wt 69.9 kg (154 lb 1.6 oz)   SpO2 97%   BMI 25.64 kg/m²     Physical Exam    Examined alert, awake, oriented, not in distress    Neck-supple, no lymphadenopathy, thyromegaly but no palpable nodules  Lungs-clear to auscultation, no rales, no wheezes  Heart-regular rate and rhythm, no murmur  Extremities-no edema, clubbing, cyanosis  Skin- previous erythematous papules on her face have already cleared     Labs:  Hospital Outpatient Visit on 12/11/2019   Component Date Value Ref Range Status   • Creatinine, Urine 12/11/2019 119.60  mg/dL Final   • Microalbumin, Urine Random 12/11/2019 0.7  mg/dL Final   • Micro Alb Creat Ratio 12/11/2019 6  0 - 30 mg/g Final   • Hep B Surface Antibody Quant 12/11/2019 <3.10  0.00 - 10.00 mIU/mL Final    Comment: Reference Interval: anti-HBs  9.99 mIU/mL or less..........Negative  10.00 mIU/mL or greater......Positive  The anti-HBs is less than 10 mIU/mL and is therefore negative.  There is no evidence of recovery from Hepatitis B infection or  evidence of antibody response to HBV vaccination.  An anti-HBs  result greater than or equal to 10 mIU/mL implies immunity.  For post-vaccination antibody testing guidelines for  the general public, refer to MMWR December 23, 2005/Vol. 54  (No.16);1-23, and for healthcare workers, refer to MMWR December 20, 2013/Vol.62(No. 10);1-19.     • Hepatitis B Surface Antigen 12/11/2019 Reactive* Negative Final    Comment: This is a presumptive result of positive.  This test has been  sent out for confirmation.  It has been reported that certain assays will not detect all HBV  mutants.  If acute or chronic HBV infection is suspected and the  HBsAg result is negative, it is recommended that other " serological  markers be tested to confirm the HBsAg nonreactivity.  HBsAg test  results should always be assessed in conjunction with the patient's  medical history, clinical examination, and other findings.     • Glycohemoglobin 12/11/2019 6.7* 0.0 - 5.6 % Final    Comment: Increased risk for diabetes:  5.7 -6.4%  Diabetes:  >6.4%  Glycemic control for adults with diabetes:  <7.0%  The above interpretations are per ADA guidelines.  Diagnosis  of diabetes mellitus on the basis of elevated Hemoglobin A1c  should be confirmed by repeating the Hb A1c test.     • Est Avg Glucose 12/11/2019 146  mg/dL Final    Comment: The eAG calculation is based on the A1c-Derived Daily Glucose  (ADAG) study.  See the ADA's website for additional information.     • Sodium 12/11/2019 138  135 - 145 mmol/L Final   • Potassium 12/11/2019 4.0  3.6 - 5.5 mmol/L Final   • Chloride 12/11/2019 103  96 - 112 mmol/L Final   • Co2 12/11/2019 26  20 - 33 mmol/L Final   • Anion Gap 12/11/2019 9.0  0.0 - 11.9 Final   • Glucose 12/11/2019 130* 65 - 99 mg/dL Final   • Bun 12/11/2019 19  8 - 22 mg/dL Final   • Creatinine 12/11/2019 0.74  0.50 - 1.40 mg/dL Final   • Calcium 12/11/2019 9.8  8.5 - 10.5 mg/dL Final   • AST(SGOT) 12/11/2019 18  12 - 45 U/L Final   • ALT(SGPT) 12/11/2019 27  2 - 50 U/L Final   • Alkaline Phosphatase 12/11/2019 44  30 - 99 U/L Final   • Total Bilirubin 12/11/2019 0.2  0.1 - 1.5 mg/dL Final   • Albumin 12/11/2019 4.4  3.2 - 4.9 g/dL Final   • Total Protein 12/11/2019 7.8  6.0 - 8.2 g/dL Final   • Globulin 12/11/2019 3.4  1.9 - 3.5 g/dL Final   • A-G Ratio 12/11/2019 1.3  g/dL Final   • Cholesterol,Tot 12/11/2019 114  100 - 199 mg/dL Final   • Triglycerides 12/11/2019 223* 0 - 149 mg/dL Final   • HDL 12/11/2019 26* >=40 mg/dL Final   • LDL 12/11/2019 43  <100 mg/dL Final   • GFR If  12/11/2019 >60  >60 mL/min/1.73 m 2 Final   • GFR If Non  12/11/2019 >60  >60 mL/min/1.73 m 2 Final   • Hepatitis B  Surface 12/11/2019 Positive* Non Confirmed Final    Comment: Hepatitis B surface antigen (HBsAg) did neutralize using anti-HBs.  This specimen is therefore POSITIVE for HBsAg. False positives can  occur. If the result is not supported by clinical evidence, repeat  testing of a new sample usually helps clarify the diagnosis.  INTERPRETIVE INFORMATION: Hepatitis B Surface Ag Confirmation  This assay should not be used for blood donor screening,  associated re-entry protocols, or for screening Human Cells,  Tissues and Cellular and Tissue-Based Products (HCT/P).  Performed by [facility],  [address] [place] [id number] [phone]  [email or web address], Jj Sherman MD, Lab. Director            Assessment/Plan:     1. Diabetes mellitus without complication (HCC)  Patient's A1C slightly increased from 6.6 to 6.7 which is still well controlled. Stable on current medications, and we will continue the current dosages. I advised her to make diet changes to improve her glucose levels. Advised her to avoid sweets, decrease her carbohydrate intake, exercise regularly and keep a healthy weight. Labs have been ordered for the next follow up visit.    - metFORMIN ER (GLUCOPHAGE XR) 500 MG TABLET SR 24 HR; Take 2 Tabs by mouth every evening.  Dispense: 180 Tab; Refill: 1    2. Essential hypertension  Blood pressure is stable and within goal on current medications. We will continue the current dosages. I have advised her to avoid salty foods and exercise regularly.   - losartan-hydrochlorothiazide (HYZAAR) 100-12.5 MG per tablet; TAKE 1 TABLET BY MOUTH ONCE DAILY  Dispense: 90 Tab; Refill: 1    3. Dyslipidemia  Her lipid panel shows that her triglycerides continue to improve from 284 to 223 which remains above goal. Her HDL has also improved from 21 to 26, but this remains below normal.  LDL at goal below 70.  We will continue her current Fenofibrate and fish oil dosages. I have advised the patient to increase her exercise  regimen to improve the HDL and avoid sweets and excess carbohydrates. Labs have been ordered for the next follow up visit.    - fenofibrate (TRICOR) 145 MG Tab; TAKE 1 TABLET BY MOUTH ONCE DAILY  Dispense: 90 Tab; Refill: 1    4. Multiple thyroid nodules  Chronic.  Prior US in 2/2019 showed multiple thyroid nodules bilaterally, all measuring less than 4 mm in diameter as well as thyromegaly. Patient has been euthyroid. Repeat US ordered for the patient to complete next year.   - US-SOFT TISSUES OF HEAD - NECK; Future    5. Hepatitis B surface antigen positive  We completed Hepatitis B testing as the patient was unsure of her immunization status. This returned showing that she is not immune to Hep B, and positive for hepatitis B surface antigen.  We need to proceed with confirmation with hepatitis B virus PCR.  We will also check hepatitis B core antibody and hepatitis B antigen.  She will complete testing directly following her visit today. We also reviewed that if the results return as positive, she will need to refer to GI for antiviral treatment and close observation.  - HEP B CORE AB TOTAL; Future  - HBV PCR Quant; Future  - HEP BE ANTIGEN; Future    6. Acne, unspecified acne type  Chronic and improved through the use of OTC products. Advised patient to continue her current regimen and contact me if it worsens.    7. Need for immunization against influenza  Flu vaccination given in the office today.   - Influenza Vaccine Quad Injection (PF)         Jose Alejandro HERNANDES (Scribe), am scribing for, and in the presence of, Raquel Bolanos MD     Electronically signed by: Jose Alejandro Mcbride (Scribe), 12/16/2019    Raquel HERNANDES MD personally performed the services described in this documentation, as scribed by Jose Alejandro Mcbride in my presence, and it is both accurate and complete.

## 2019-12-17 LAB — HBV E AG SERPL QL IA: NEGATIVE

## 2019-12-18 DIAGNOSIS — B19.10 HEPATITIS B VIRUS INFECTION, UNSPECIFIED CHRONICITY: ICD-10-CM

## 2019-12-18 LAB
HBV DNA SERPL NAA+PROBE-ACNC: ABNORMAL [IU]/ML
HBV DNA SERPL NAA+PROBE-LOG IU: 3.23 LOG IU/ML
HBV DNA SERPL QL NAA+PROBE: DETECTED

## 2020-04-13 ENCOUNTER — HOSPITAL ENCOUNTER (OUTPATIENT)
Dept: LAB | Facility: MEDICAL CENTER | Age: 45
End: 2020-04-13
Attending: FAMILY MEDICINE
Payer: COMMERCIAL

## 2020-04-13 ENCOUNTER — OFFICE VISIT (OUTPATIENT)
Dept: MEDICAL GROUP | Facility: PHYSICIAN GROUP | Age: 45
End: 2020-04-13
Payer: COMMERCIAL

## 2020-04-13 VITALS
DIASTOLIC BLOOD PRESSURE: 80 MMHG | HEIGHT: 65 IN | OXYGEN SATURATION: 96 % | WEIGHT: 153.66 LBS | SYSTOLIC BLOOD PRESSURE: 120 MMHG | HEART RATE: 85 BPM | TEMPERATURE: 97.7 F | BODY MASS INDEX: 25.6 KG/M2

## 2020-04-13 DIAGNOSIS — E78.5 DYSLIPIDEMIA: ICD-10-CM

## 2020-04-13 DIAGNOSIS — I10 ESSENTIAL HYPERTENSION: ICD-10-CM

## 2020-04-13 DIAGNOSIS — E55.9 VITAMIN D DEFICIENCY: ICD-10-CM

## 2020-04-13 DIAGNOSIS — E04.2 MULTIPLE THYROID NODULES: ICD-10-CM

## 2020-04-13 DIAGNOSIS — E11.9 DIABETES MELLITUS WITHOUT COMPLICATION (HCC): ICD-10-CM

## 2020-04-13 DIAGNOSIS — Z12.39 SCREENING FOR BREAST CANCER: ICD-10-CM

## 2020-04-13 DIAGNOSIS — R76.8 HEPATITIS B SURFACE ANTIGEN POSITIVE: ICD-10-CM

## 2020-04-13 LAB
25(OH)D3 SERPL-MCNC: 39 NG/ML (ref 30–100)
ALBUMIN SERPL BCP-MCNC: 4.4 G/DL (ref 3.2–4.9)
ALBUMIN/GLOB SERPL: 1.4 G/DL
ALP SERPL-CCNC: 49 U/L (ref 30–99)
ALT SERPL-CCNC: 35 U/L (ref 2–50)
ANION GAP SERPL CALC-SCNC: 12 MMOL/L (ref 7–16)
AST SERPL-CCNC: 25 U/L (ref 12–45)
BASOPHILS # BLD AUTO: 0.6 % (ref 0–1.8)
BASOPHILS # BLD: 0.05 K/UL (ref 0–0.12)
BILIRUB SERPL-MCNC: 0.3 MG/DL (ref 0.1–1.5)
BUN SERPL-MCNC: 13 MG/DL (ref 8–22)
CALCIUM SERPL-MCNC: 9 MG/DL (ref 8.5–10.5)
CHLORIDE SERPL-SCNC: 100 MMOL/L (ref 96–112)
CHOLEST SERPL-MCNC: 134 MG/DL (ref 100–199)
CO2 SERPL-SCNC: 25 MMOL/L (ref 20–33)
CREAT SERPL-MCNC: 0.57 MG/DL (ref 0.5–1.4)
EOSINOPHIL # BLD AUTO: 0.24 K/UL (ref 0–0.51)
EOSINOPHIL NFR BLD: 2.8 % (ref 0–6.9)
ERYTHROCYTE [DISTWIDTH] IN BLOOD BY AUTOMATED COUNT: 46.6 FL (ref 35.9–50)
EST. AVERAGE GLUCOSE BLD GHB EST-MCNC: 146 MG/DL
FASTING STATUS PATIENT QL REPORTED: NORMAL
GLOBULIN SER CALC-MCNC: 3.2 G/DL (ref 1.9–3.5)
GLUCOSE SERPL-MCNC: 112 MG/DL (ref 65–99)
HBA1C MFR BLD: 6.7 % (ref 0–5.6)
HCT VFR BLD AUTO: 43.4 % (ref 37–47)
HDLC SERPL-MCNC: 23 MG/DL
HGB BLD-MCNC: 14.4 G/DL (ref 12–16)
IMM GRANULOCYTES # BLD AUTO: 0.03 K/UL (ref 0–0.11)
IMM GRANULOCYTES NFR BLD AUTO: 0.4 % (ref 0–0.9)
LDLC SERPL CALC-MCNC: ABNORMAL MG/DL
LYMPHOCYTES # BLD AUTO: 3.84 K/UL (ref 1–4.8)
LYMPHOCYTES NFR BLD: 45.6 % (ref 22–41)
MCH RBC QN AUTO: 29.1 PG (ref 27–33)
MCHC RBC AUTO-ENTMCNC: 33.2 G/DL (ref 33.6–35)
MCV RBC AUTO: 87.9 FL (ref 81.4–97.8)
MONOCYTES # BLD AUTO: 0.64 K/UL (ref 0–0.85)
MONOCYTES NFR BLD AUTO: 7.6 % (ref 0–13.4)
NEUTROPHILS # BLD AUTO: 3.63 K/UL (ref 2–7.15)
NEUTROPHILS NFR BLD: 43 % (ref 44–72)
NRBC # BLD AUTO: 0 K/UL
NRBC BLD-RTO: 0 /100 WBC
PLATELET # BLD AUTO: 349 K/UL (ref 164–446)
PMV BLD AUTO: 9 FL (ref 9–12.9)
POTASSIUM SERPL-SCNC: 4 MMOL/L (ref 3.6–5.5)
PROT SERPL-MCNC: 7.6 G/DL (ref 6–8.2)
RBC # BLD AUTO: 4.94 M/UL (ref 4.2–5.4)
SODIUM SERPL-SCNC: 137 MMOL/L (ref 135–145)
TRIGL SERPL-MCNC: 406 MG/DL (ref 0–149)
TSH SERPL DL<=0.005 MIU/L-ACNC: 1.79 UIU/ML (ref 0.38–5.33)
WBC # BLD AUTO: 8.4 K/UL (ref 4.8–10.8)

## 2020-04-13 PROCEDURE — 84443 ASSAY THYROID STIM HORMONE: CPT

## 2020-04-13 PROCEDURE — 80061 LIPID PANEL: CPT

## 2020-04-13 PROCEDURE — 82306 VITAMIN D 25 HYDROXY: CPT

## 2020-04-13 PROCEDURE — 80053 COMPREHEN METABOLIC PANEL: CPT

## 2020-04-13 PROCEDURE — 83036 HEMOGLOBIN GLYCOSYLATED A1C: CPT

## 2020-04-13 PROCEDURE — 99214 OFFICE O/P EST MOD 30 MIN: CPT | Performed by: FAMILY MEDICINE

## 2020-04-13 PROCEDURE — 36415 COLL VENOUS BLD VENIPUNCTURE: CPT

## 2020-04-13 PROCEDURE — 85025 COMPLETE CBC W/AUTO DIFF WBC: CPT

## 2020-04-13 ASSESSMENT — FIBROSIS 4 INDEX: FIB4 SCORE: 0.36

## 2020-04-13 ASSESSMENT — PATIENT HEALTH QUESTIONNAIRE - PHQ9: CLINICAL INTERPRETATION OF PHQ2 SCORE: 0

## 2020-04-13 NOTE — PROGRESS NOTES
Subjective:      Ita Johnson is a 44 y.o. female who presents with Diabetes            HPI     Patient is here for follow-up of her medical problems.    In terms of her diabetes mellitus type 2, she continues to take metformin  mg 2tablets at night without side effects.  Her last hemoglobin A1c was done in December 2019 and was 6.7.    For her dyslipidemia, she continues to take fenofibrate and fish oil tablets.  The addition of the fish oil tablets helped lowering the triglyceride significantly from 400s to 200s.  She continues to have low HDL level in the 20s.  The last lipid panel was in December 2019 with triglycerides of 223, HDL 26 and LDL of 46.    In terms of her hypertension, she continues to take losartan/HCTZ for her blood pressure.    We follow her for multinodular goiter.  Her last thyroid ultrasound was in February 2019 thyroid nodules which were in size, borderline enlarged thyroid gland and minimally heterogeneous which could indicate thyroiditis.  She has been euthyroid with the last TSH in February 2019.    For her vitamin D deficiency, she continues to take vitamin D supplementation.  She is not sure how much she is taking but she said it is probably 2000 international units daily.  The last vitamin D level was normal at 47 in May 2019.    We checked her for hepatitis B to see if she is already immune to it so we do not have to give her hepatitis B vaccination.  This came back positive for hepatitis B surface antigen and negative hepatitis B surface antibody.  Additional blood work showed positive hepatitis B virus PCR with viral load of 1,684.  She is also positive for hepatitis B core antibody but negative for hepatitis B e antigen indicating that she may be sero-converting.  I have referred her to the GI specialist but she said she tried calling them twice and she was not able to set up an appointment.    She is also due for screening mammogram.    Past medical history, past  "surgical history, family history reviewed-no changes    Social history reviewed-no changes    Allergies reviewed-no changes    Medications reviewed-no changes    ROS     As per HPI, the rest are negative.       Objective:     /80 (BP Location: Left arm, Patient Position: Sitting, BP Cuff Size: Adult)   Pulse 85   Temp 36.5 °C (97.7 °F) (Temporal)   Ht 1.651 m (5' 5\")   Wt 69.7 kg (153 lb 10.6 oz)   SpO2 96%   BMI 25.57 kg/m²      Physical Exam     Examined alert, awake, oriented, not in distress    Neck-supple, no lymphadenopathy, positive thyromegaly which is an old finding, no palpable nodules, no tenderness  Lungs-clear to auscultation, no rales, no wheezes  Heart-regular rate and rhythm, no murmur  Extremities-no edema, clubbing, cyanosis  Monofilament testing with a 10 gram force: sensation intact: intact bilaterally  Visual Inspection: Feet without maceration, ulcers, fissures.  Pedal pulses: intact bilaterally            Assessment/Plan:       1. Diabetes mellitus without complication (HCC)  Her last blood work diabetes was under control.  We will do updated blood work today since she has been fasting.  Continue current dose of metformin.  I will communicate results to her.  We did a diabetic foot exam today.  - TSH; Future  - Lipid Profile; Future  - Comp Metabolic Panel; Future  - HEMOGLOBIN A1C; Future  - CBC WITH DIFFERENTIAL; Future  - VITAMIN D,25 HYDROXY; Future  - Diabetic Monofilament Lower Extremity Exam    2. Dyslipidemia  Per last blood work triglycerides improved but still 200s, HDL still running low in the 20s and LDL at goal.  Continue fenofibrate and fish oil tablets.  Advised to work harder on avoidance of sweets, decreasing the carbs, regular exercise watching the fatty foods.  Recheck panel today.  Continue fenofibrates and fish oil tablets.  - TSH; Future  - Lipid Profile; Future  - Comp Metabolic Panel; Future  - HEMOGLOBIN A1C; Future  - CBC WITH DIFFERENTIAL; Future  - " VITAMIN D,25 HYDROXY; Future    3. Essential hypertension  Controlled on her current medications.  - TSH; Future  - Lipid Profile; Future  - Comp Metabolic Panel; Future  - HEMOGLOBIN A1C; Future  - CBC WITH DIFFERENTIAL; Future  - VITAMIN D,25 HYDROXY; Future    4. Vitamin D deficiency  Continue vitamin D supplementation and we will check vitamin D level.  - TSH; Future  - Lipid Profile; Future  - Comp Metabolic Panel; Future  - HEMOGLOBIN A1C; Future  - CBC WITH DIFFERENTIAL; Future  - VITAMIN D,25 HYDROXY; Future    5. Multiple thyroid nodules  Advised to do an updated thyroid ultrasound and order was given.  We will do updated TSH.  - TSH; Future  - Lipid Profile; Future  - Comp Metabolic Panel; Future  - HEMOGLOBIN A1C; Future  - CBC WITH DIFFERENTIAL; Future  - VITAMIN D,25 HYDROXY; Future    6. Hepatitis B surface antigen positive  Hepatitis B surface antibody negative but hepatitis B core antibody is positive, hepatitis B virus by PCR positive with viral load of 1684.  Hepatitis B e antigen negative.  Most likely patient is seroconverting.  I advised the patient to schedule an appointment with GI specialist and I gave her referral information she will she can call and make an appointment.    7. Screening for breast cancer  She is due for screening mammogram and advised to schedule one.  - MA-SCREENING MAMMO BILAT W/CAD; Future    Follow-up in 4 months.      Please note that this dictation was created using voice recognition software. I have worked with consultants from the vendor as well as technical experts from [email or web address]  [email or web address] to optimize the interface. I have made every reasonable attempt to correct obvious errors, but I expect that there are errors of grammar and possibly content I did not discover before finalizing the note.

## 2020-04-14 DIAGNOSIS — E11.9 DIABETES MELLITUS WITHOUT COMPLICATION (HCC): ICD-10-CM

## 2020-04-14 DIAGNOSIS — E78.5 DYSLIPIDEMIA: ICD-10-CM

## 2020-05-30 ENCOUNTER — HOSPITAL ENCOUNTER (OUTPATIENT)
Facility: MEDICAL CENTER | Age: 45
End: 2020-05-30
Payer: COMMERCIAL

## 2020-06-03 LAB
SARS-COV-2 RNA SPEC QL NAA+PROBE: NOT DETECTED
SPECIMEN SOURCE: NORMAL

## 2020-06-09 DIAGNOSIS — E11.9 DIABETES MELLITUS WITHOUT COMPLICATION (HCC): ICD-10-CM

## 2020-06-09 RX ORDER — METFORMIN HYDROCHLORIDE 500 MG/1
TABLET, EXTENDED RELEASE ORAL
Qty: 180 TAB | Refills: 0 | Status: SHIPPED | OUTPATIENT
Start: 2020-06-09 | End: 2020-09-09

## 2020-07-13 ENCOUNTER — HOSPITAL ENCOUNTER (OUTPATIENT)
Dept: RADIOLOGY | Facility: MEDICAL CENTER | Age: 45
End: 2020-07-13
Attending: FAMILY MEDICINE
Payer: COMMERCIAL

## 2020-07-13 ENCOUNTER — HOSPITAL ENCOUNTER (OUTPATIENT)
Dept: LAB | Facility: MEDICAL CENTER | Age: 45
End: 2020-07-13
Attending: FAMILY MEDICINE
Payer: COMMERCIAL

## 2020-07-13 DIAGNOSIS — E04.2 MULTIPLE THYROID NODULES: ICD-10-CM

## 2020-07-13 DIAGNOSIS — E11.9 DIABETES MELLITUS WITHOUT COMPLICATION (HCC): ICD-10-CM

## 2020-07-13 DIAGNOSIS — Z12.39 SCREENING FOR BREAST CANCER: ICD-10-CM

## 2020-07-13 DIAGNOSIS — E78.5 DYSLIPIDEMIA: ICD-10-CM

## 2020-07-13 LAB
ALBUMIN SERPL BCP-MCNC: 4.3 G/DL (ref 3.2–4.9)
ALBUMIN/GLOB SERPL: 1.3 G/DL
ALP SERPL-CCNC: 44 U/L (ref 30–99)
ALT SERPL-CCNC: 17 U/L (ref 2–50)
ANION GAP SERPL CALC-SCNC: 11 MMOL/L (ref 7–16)
AST SERPL-CCNC: 11 U/L (ref 12–45)
BILIRUB SERPL-MCNC: 0.4 MG/DL (ref 0.1–1.5)
BUN SERPL-MCNC: 14 MG/DL (ref 8–22)
CALCIUM SERPL-MCNC: 9.5 MG/DL (ref 8.5–10.5)
CHLORIDE SERPL-SCNC: 98 MMOL/L (ref 96–112)
CHOLEST SERPL-MCNC: 110 MG/DL (ref 100–199)
CO2 SERPL-SCNC: 26 MMOL/L (ref 20–33)
CREAT SERPL-MCNC: 0.63 MG/DL (ref 0.5–1.4)
EST. AVERAGE GLUCOSE BLD GHB EST-MCNC: 134 MG/DL
FASTING STATUS PATIENT QL REPORTED: NORMAL
GLOBULIN SER CALC-MCNC: 3.4 G/DL (ref 1.9–3.5)
GLUCOSE SERPL-MCNC: 139 MG/DL (ref 65–99)
HBA1C MFR BLD: 6.3 % (ref 0–5.6)
HDLC SERPL-MCNC: 23 MG/DL
LDLC SERPL CALC-MCNC: 42 MG/DL
POTASSIUM SERPL-SCNC: 4.2 MMOL/L (ref 3.6–5.5)
PROT SERPL-MCNC: 7.7 G/DL (ref 6–8.2)
SODIUM SERPL-SCNC: 135 MMOL/L (ref 135–145)
TRIGL SERPL-MCNC: 227 MG/DL (ref 0–149)

## 2020-07-13 PROCEDURE — 83036 HEMOGLOBIN GLYCOSYLATED A1C: CPT

## 2020-07-13 PROCEDURE — 80053 COMPREHEN METABOLIC PANEL: CPT

## 2020-07-13 PROCEDURE — 80061 LIPID PANEL: CPT

## 2020-07-13 PROCEDURE — 76536 US EXAM OF HEAD AND NECK: CPT

## 2020-07-13 PROCEDURE — 36415 COLL VENOUS BLD VENIPUNCTURE: CPT

## 2020-07-16 ENCOUNTER — HOSPITAL ENCOUNTER (OUTPATIENT)
Dept: RADIOLOGY | Facility: MEDICAL CENTER | Age: 45
End: 2020-07-16
Attending: FAMILY MEDICINE
Payer: COMMERCIAL

## 2020-07-16 DIAGNOSIS — N63.0 BREAST LUMP: ICD-10-CM

## 2020-07-16 PROCEDURE — 76642 ULTRASOUND BREAST LIMITED: CPT | Mod: LT

## 2020-07-16 PROCEDURE — G0279 TOMOSYNTHESIS, MAMMO: HCPCS

## 2020-08-10 ENCOUNTER — APPOINTMENT (OUTPATIENT)
Dept: MEDICAL GROUP | Facility: PHYSICIAN GROUP | Age: 45
End: 2020-08-10
Payer: COMMERCIAL

## 2020-08-17 ENCOUNTER — APPOINTMENT (OUTPATIENT)
Dept: MEDICAL GROUP | Facility: PHYSICIAN GROUP | Age: 45
End: 2020-08-17
Payer: COMMERCIAL

## 2020-11-19 ENCOUNTER — HOSPITAL ENCOUNTER (OUTPATIENT)
Dept: LAB | Facility: MEDICAL CENTER | Age: 45
End: 2020-11-19
Payer: COMMERCIAL

## 2020-11-20 LAB — COVID ORDER STATUS COVID19: NORMAL

## 2020-11-21 LAB
SARS-COV-2 RNA RESP QL NAA+PROBE: NOTDETECTED
SPECIMEN SOURCE: NORMAL

## 2020-12-09 ENCOUNTER — OFFICE VISIT (OUTPATIENT)
Dept: MEDICAL GROUP | Facility: PHYSICIAN GROUP | Age: 45
End: 2020-12-09
Payer: COMMERCIAL

## 2020-12-09 VITALS
HEART RATE: 95 BPM | DIASTOLIC BLOOD PRESSURE: 60 MMHG | HEIGHT: 65 IN | OXYGEN SATURATION: 97 % | WEIGHT: 154.1 LBS | BODY MASS INDEX: 25.67 KG/M2 | SYSTOLIC BLOOD PRESSURE: 118 MMHG | TEMPERATURE: 97.8 F

## 2020-12-09 DIAGNOSIS — I10 ESSENTIAL HYPERTENSION: ICD-10-CM

## 2020-12-09 DIAGNOSIS — E78.5 DYSLIPIDEMIA: ICD-10-CM

## 2020-12-09 DIAGNOSIS — R76.8 HEPATITIS B SURFACE ANTIGEN POSITIVE: ICD-10-CM

## 2020-12-09 DIAGNOSIS — E55.9 VITAMIN D DEFICIENCY: ICD-10-CM

## 2020-12-09 DIAGNOSIS — E11.9 DIABETES MELLITUS WITHOUT COMPLICATION (HCC): ICD-10-CM

## 2020-12-09 DIAGNOSIS — Z23 NEED FOR IMMUNIZATION AGAINST INFLUENZA: ICD-10-CM

## 2020-12-09 PROCEDURE — 90686 IIV4 VACC NO PRSV 0.5 ML IM: CPT | Performed by: FAMILY MEDICINE

## 2020-12-09 PROCEDURE — 90471 IMMUNIZATION ADMIN: CPT | Performed by: FAMILY MEDICINE

## 2020-12-09 PROCEDURE — 99214 OFFICE O/P EST MOD 30 MIN: CPT | Mod: 25 | Performed by: FAMILY MEDICINE

## 2020-12-09 ASSESSMENT — FIBROSIS 4 INDEX: FIB4 SCORE: 0.34

## 2020-12-09 NOTE — LETTER
[facility]  Raquel Bolanos M.D.  [address]  [place] [address]  Fax: [phone]   Authorization for Release/Disclosure of   Protected Health Information   Name: ITA JOHNSON : 1975 SSN: [id number]   Address: [address] [place] [address] [place] [address] Phone:    [phone] (home)    I authorize the entity listed below to release/disclose the PHI below to:   [facility]/Raquel Bolanos M.D. and Raquel Bolanos M.D.   Provider or Entity Name:    Lizabeth LeeRAH calzada   Address   City, State, Zip   Phone:      Fax:     Reason for request: continuity of care   Information to be released:    [  ] LAST COLONOSCOPY,  including any PATH REPORT and follow-up  [  ] LAST FIT/COLOGUARD RESULT [  ] LAST DEXA  [  ] LAST MAMMOGRAM  [  ] LAST PAP  [  ] LAST LABS [ x ] RETINA EXAM REPORT  [  ] IMMUNIZATION RECORDS  [  ] Release all info      [  ] Check here and initial the line next to each item to release ALL health information INCLUDING  _____ Care and treatment for drug and / or alcohol abuse  _____ HIV testing, infection status, or AIDS  _____ Genetic Testing    DATES OF SERVICE OR TIME PERIOD TO BE DISCLOSED: _____________  I understand and acknowledge that:  * This Authorization may be revoked at any time by you in writing, except if your health information has already been used or disclosed.  * Your health information that will be used or disclosed as a result of you signing this authorization could be re-disclosed by the recipient. If this occurs, your re-disclosed health information may no longer be protected by State or Federal laws.  * You may refuse to sign this Authorization. Your refusal will not affect your ability to obtain treatment.  * This Authorization becomes effective upon signing and will  on (date) __________.      If no date is indicated, this Authorization will  one (1) year from the signature date.    Name: Ita Johnson    Signature:   Date:     2020       PLEASE FAX  REQUESTED RECORDS BACK TO: [phone]

## 2020-12-10 ENCOUNTER — HOSPITAL ENCOUNTER (OUTPATIENT)
Dept: LAB | Facility: MEDICAL CENTER | Age: 45
End: 2020-12-10
Attending: FAMILY MEDICINE
Payer: COMMERCIAL

## 2020-12-10 DIAGNOSIS — I10 ESSENTIAL HYPERTENSION: ICD-10-CM

## 2020-12-10 DIAGNOSIS — E11.9 DIABETES MELLITUS WITHOUT COMPLICATION (HCC): ICD-10-CM

## 2020-12-10 DIAGNOSIS — E78.5 DYSLIPIDEMIA: ICD-10-CM

## 2020-12-10 DIAGNOSIS — R76.8 HEPATITIS B SURFACE ANTIGEN POSITIVE: ICD-10-CM

## 2020-12-10 DIAGNOSIS — E55.9 VITAMIN D DEFICIENCY: ICD-10-CM

## 2020-12-10 LAB
ALBUMIN SERPL BCP-MCNC: 4.4 G/DL (ref 3.2–4.9)
ALBUMIN/GLOB SERPL: 1.3 G/DL
ALP SERPL-CCNC: 52 U/L (ref 30–99)
ALT SERPL-CCNC: 26 U/L (ref 2–50)
ANION GAP SERPL CALC-SCNC: 10 MMOL/L (ref 7–16)
AST SERPL-CCNC: 18 U/L (ref 12–45)
BASOPHILS # BLD AUTO: 0.6 % (ref 0–1.8)
BASOPHILS # BLD: 0.05 K/UL (ref 0–0.12)
BILIRUB SERPL-MCNC: 0.3 MG/DL (ref 0.1–1.5)
BUN SERPL-MCNC: 17 MG/DL (ref 8–22)
CALCIUM SERPL-MCNC: 9.4 MG/DL (ref 8.5–10.5)
CHLORIDE SERPL-SCNC: 99 MMOL/L (ref 96–112)
CHOLEST SERPL-MCNC: 128 MG/DL (ref 100–199)
CO2 SERPL-SCNC: 25 MMOL/L (ref 20–33)
CREAT SERPL-MCNC: 0.66 MG/DL (ref 0.5–1.4)
CREAT UR-MCNC: 128.16 MG/DL
EOSINOPHIL # BLD AUTO: 0.26 K/UL (ref 0–0.51)
EOSINOPHIL NFR BLD: 3.1 % (ref 0–6.9)
ERYTHROCYTE [DISTWIDTH] IN BLOOD BY AUTOMATED COUNT: 48.1 FL (ref 35.9–50)
EST. AVERAGE GLUCOSE BLD GHB EST-MCNC: 140 MG/DL
GLOBULIN SER CALC-MCNC: 3.3 G/DL (ref 1.9–3.5)
GLUCOSE SERPL-MCNC: 147 MG/DL (ref 65–99)
HBA1C MFR BLD: 6.5 % (ref 0–5.6)
HCT VFR BLD AUTO: 45.8 % (ref 37–47)
HDLC SERPL-MCNC: 21 MG/DL
HGB BLD-MCNC: 14.5 G/DL (ref 12–16)
IMM GRANULOCYTES # BLD AUTO: 0.03 K/UL (ref 0–0.11)
IMM GRANULOCYTES NFR BLD AUTO: 0.4 % (ref 0–0.9)
LDLC SERPL CALC-MCNC: 30 MG/DL
LYMPHOCYTES # BLD AUTO: 2.89 K/UL (ref 1–4.8)
LYMPHOCYTES NFR BLD: 34.4 % (ref 22–41)
MCH RBC QN AUTO: 28.9 PG (ref 27–33)
MCHC RBC AUTO-ENTMCNC: 31.7 G/DL (ref 33.6–35)
MCV RBC AUTO: 91.4 FL (ref 81.4–97.8)
MICROALBUMIN UR-MCNC: <1.2 MG/DL
MICROALBUMIN/CREAT UR: NORMAL MG/G (ref 0–30)
MONOCYTES # BLD AUTO: 0.67 K/UL (ref 0–0.85)
MONOCYTES NFR BLD AUTO: 8 % (ref 0–13.4)
NEUTROPHILS # BLD AUTO: 4.51 K/UL (ref 2–7.15)
NEUTROPHILS NFR BLD: 53.5 % (ref 44–72)
NRBC # BLD AUTO: 0 K/UL
NRBC BLD-RTO: 0 /100 WBC
PLATELET # BLD AUTO: 429 K/UL (ref 164–446)
PMV BLD AUTO: 9.3 FL (ref 9–12.9)
POTASSIUM SERPL-SCNC: 3.9 MMOL/L (ref 3.6–5.5)
PROT SERPL-MCNC: 7.7 G/DL (ref 6–8.2)
RBC # BLD AUTO: 5.01 M/UL (ref 4.2–5.4)
SODIUM SERPL-SCNC: 134 MMOL/L (ref 135–145)
TRIGL SERPL-MCNC: 387 MG/DL (ref 0–149)
WBC # BLD AUTO: 8.4 K/UL (ref 4.8–10.8)

## 2020-12-10 PROCEDURE — 83036 HEMOGLOBIN GLYCOSYLATED A1C: CPT

## 2020-12-10 PROCEDURE — 86706 HEP B SURFACE ANTIBODY: CPT

## 2020-12-10 PROCEDURE — 82306 VITAMIN D 25 HYDROXY: CPT

## 2020-12-10 PROCEDURE — 87340 HEPATITIS B SURFACE AG IA: CPT

## 2020-12-10 PROCEDURE — 87517 HEPATITIS B DNA QUANT: CPT

## 2020-12-10 PROCEDURE — 86704 HEP B CORE ANTIBODY TOTAL: CPT

## 2020-12-10 PROCEDURE — 85025 COMPLETE CBC W/AUTO DIFF WBC: CPT

## 2020-12-10 PROCEDURE — 80061 LIPID PANEL: CPT

## 2020-12-10 PROCEDURE — 80053 COMPREHEN METABOLIC PANEL: CPT

## 2020-12-10 PROCEDURE — 82570 ASSAY OF URINE CREATININE: CPT

## 2020-12-10 PROCEDURE — 36415 COLL VENOUS BLD VENIPUNCTURE: CPT

## 2020-12-10 PROCEDURE — 87350 HEPATITIS BE AG IA: CPT

## 2020-12-10 PROCEDURE — 82043 UR ALBUMIN QUANTITATIVE: CPT

## 2020-12-10 PROCEDURE — 87341 HEP B SURFACE AG NEUTRLZJ IA: CPT

## 2020-12-10 NOTE — PROGRESS NOTES
"Subjective:      Ita Johnson is a 45 y.o. female who presents with Diabetes            HPI     Patient is here for follow-up of her medical problems.    In terms of her diabetes mellitus, she continues to take Metformin XR 2 tablets every evening without side effects.  Her last hemoglobin A1c was in July 2020 and was 6.3.    She continues to take losartan/HCTZ/12.5 daily with good control of her blood pressure.    In terms of the dyslipidemia, patient continues to take fenofibrate and fish oil tablets.  The last lipid panel in July 2020 triglycerides were still high although improved from 406 to 227, HDL was still low at 23 and LDL at goal at 42.    I referred her to the GI specialist in December 2019 because of positive hepatitis B surface antigen.  Further work-up at that time showed positive hepatitis B core antibody but negative hepatitis B surface antibody.  Her hepatitis B virus by PCR was positive with a viral load of 1684 IU/mL.  Hepatitis B E antigen came back negative.  Patient did not follow through with a referral.  Denies any GI symptoms.    She continues to take vitamin D supplementation for vitamin D deficiency.    Past medical history, past surgical history, family history reviewed-no changes    Social history reviewed-no changes    Allergies reviewed-no changes    Medications reviewed-no changes    ROS     As per HPI, the rest are negative.     Objective:     /60 (BP Location: Left arm, Patient Position: Sitting, BP Cuff Size: Adult)   Pulse 95   Temp 36.6 °C (97.8 °F) (Temporal)   Ht 1.651 m (5' 5\")   Wt 69.9 kg (154 lb 1.6 oz)   SpO2 97%   BMI 25.64 kg/m²      Physical Exam     Examined alert, awake, oriented, not in distress    Neck-supple, no lymphadenopathy, no thyromegaly  Lungs-clear to auscultation, no rales, no wheezes  Heart-regular rate and rhythm, no murmur  Extremities-no edema, clubbing, cyanosis            Assessment/Plan:        1. Diabetes mellitus without " complication (HCC)  She will do blood work as soon as possible.  Continue Metformin.  I will communicate results to her.  - Lipid Profile; Future  - Comp Metabolic Panel; Future  - HEMOGLOBIN A1C; Future  - CBC WITH DIFFERENTIAL; Future  - MICROALBUMIN CREAT RATIO URINE; Future  - VITAMIN D,25 HYDROXY; Future    2. Essential hypertension  Controlled on her medication.  - Lipid Profile; Future  - Comp Metabolic Panel; Future  - HEMOGLOBIN A1C; Future  - CBC WITH DIFFERENTIAL; Future  - MICROALBUMIN CREAT RATIO URINE; Future  - VITAMIN D,25 HYDROXY; Future    3. Dyslipidemia  Continue fenofibrate and we will do follow-up lipid panel.  Advised to continue to avoid sweets, decrease the carbs.  Advised to increase activity to improve the HDL.  - Lipid Profile; Future  - Comp Metabolic Panel; Future  - HEMOGLOBIN A1C; Future  - CBC WITH DIFFERENTIAL; Future  - MICROALBUMIN CREAT RATIO URINE; Future  - VITAMIN D,25 HYDROXY; Future    4. Vitamin D deficiency  We will check vitamin D level.  - Lipid Profile; Future  - Comp Metabolic Panel; Future  - HEMOGLOBIN A1C; Future  - CBC WITH DIFFERENTIAL; Future  - MICROALBUMIN CREAT RATIO URINE; Future  - VITAMIN D,25 HYDROXY; Future    5. Hepatitis B surface antigen positive  Discussed with patient the need to see GI specialist for consultation.  Discussed potential for liver failure, liver cancer in people with chronic hepatitis B infection.  I will do updated blood work to check for seroconversion and I will put in a new referral to GI specialist depending on results.  Strongly advised complete avoidance of alcohol to avoid more damage to the liver.  - HEP B SURFACE ANTIGEN; Future  - HEP B SURFACE AB; Future  - HEP BE ANTIGEN; Future  - HBV PCR Quant; Future  - HEP B CORE AB TOTAL; Future    6. Need for immunization against influenza  Flu shot was given.  - Influenza Vaccine Quad Injection (PF)    Return in 4 months for follow-up or sooner if needed.      Please note that this  dictation was created using voice recognition software. I have worked with consultants from the vendor as well as technical experts from [facility] to optimize the interface. I have made every reasonable attempt to correct obvious errors, but I expect that there are errors of grammar and possibly content I did not discover before finalizing the note.

## 2020-12-11 LAB
25(OH)D3 SERPL-MCNC: 37 NG/ML (ref 30–100)
HBV CORE AB SERPL QL IA: REACTIVE
HBV SURFACE AB SERPL IA-ACNC: <3.5 MIU/ML (ref 0–10)
HBV SURFACE AG SER QL: REACTIVE

## 2020-12-12 LAB — HBV E AG SERPL QL IA: NEGATIVE

## 2020-12-15 ENCOUNTER — HOSPITAL ENCOUNTER (OUTPATIENT)
Facility: MEDICAL CENTER | Age: 45
End: 2020-12-15
Attending: FAMILY MEDICINE
Payer: COMMERCIAL

## 2020-12-15 ENCOUNTER — OFFICE VISIT (OUTPATIENT)
Dept: MEDICAL GROUP | Facility: PHYSICIAN GROUP | Age: 45
End: 2020-12-15
Payer: COMMERCIAL

## 2020-12-15 VITALS
HEART RATE: 90 BPM | TEMPERATURE: 97.9 F | HEIGHT: 65 IN | OXYGEN SATURATION: 97 % | DIASTOLIC BLOOD PRESSURE: 80 MMHG | BODY MASS INDEX: 26.12 KG/M2 | SYSTOLIC BLOOD PRESSURE: 130 MMHG | WEIGHT: 156.75 LBS

## 2020-12-15 DIAGNOSIS — E78.5 DYSLIPIDEMIA: ICD-10-CM

## 2020-12-15 DIAGNOSIS — E55.9 VITAMIN D DEFICIENCY: ICD-10-CM

## 2020-12-15 DIAGNOSIS — Z11.51 SCREENING FOR HUMAN PAPILLOMAVIRUS (HPV): ICD-10-CM

## 2020-12-15 DIAGNOSIS — Z01.419 ENCOUNTER FOR ROUTINE GYNECOLOGICAL EXAMINATION WITH PAPANICOLAOU SMEAR OF CERVIX: ICD-10-CM

## 2020-12-15 DIAGNOSIS — N60.02 BREAST CYST, LEFT: ICD-10-CM

## 2020-12-15 DIAGNOSIS — E11.9 DIABETES MELLITUS WITHOUT COMPLICATION (HCC): ICD-10-CM

## 2020-12-15 DIAGNOSIS — B19.10 HEPATITIS B VIRUS INFECTION, UNSPECIFIED CHRONICITY: ICD-10-CM

## 2020-12-15 PROCEDURE — 99396 PREV VISIT EST AGE 40-64: CPT | Performed by: FAMILY MEDICINE

## 2020-12-15 PROCEDURE — 87624 HPV HI-RISK TYP POOLED RSLT: CPT

## 2020-12-15 PROCEDURE — 99000 SPECIMEN HANDLING OFFICE-LAB: CPT | Performed by: FAMILY MEDICINE

## 2020-12-15 PROCEDURE — 88175 CYTOPATH C/V AUTO FLUID REDO: CPT

## 2020-12-15 ASSESSMENT — FIBROSIS 4 INDEX: FIB4 SCORE: 0.36

## 2020-12-15 NOTE — PROGRESS NOTES
Subjective:      Ita Johnson is a 45 y.o. female who presents with Gynecologic Exam (PAP and Gyn)            HPI     Patient returns for routine GYN exam/Pap smear as well as for follow-up of her lab results.    Her last Pap smear was 3 years ago and came back within normal limits and negative HPV.  No history of abnormal Pap smear.  No history of STD.  Patient is  0 para 0.  LMP 2020.  She had diagnostic mammogram and ultrasound of the left breast because of breast mass which came back 10 mm cyst left upper quadrant and no evidence of malignancy.  Recommendation is to do screening mammogram in a year.  Up-to-date with flu shot and Pneumovax 23.    I have referred her to the GI specialist in 2019 for positive hepatitis B surface antigen, negative hepatitis B surface antibody with positive hepatitis B core antibody.  Her hepatitis B E antigen was negative.  Hepatitis B virus by PCR was positive with a viral load of 1684 IU/mL.  She did not follow through with the referral.  We did a follow-up blood work before this visit.    I reviewed the following    Past Medical History:   Diagnosis Date   • DM type 2 (diabetes mellitus, type 2) (HCC)    • Dyslipidemia    • HTN (hypertension)    • Vitamin D deficiency         History reviewed. No pertinent surgical history.    No Known Allergies    Current Outpatient Medications   Medication Sig Dispense Refill   • fenofibrate (TRICOR) 145 MG Tab TAKE 1 TABLET BY MOUTH EVERY DAY 90 Tab 0   • metFORMIN ER (GLUCOPHAGE XR) 500 MG TABLET SR 24 HR TAKE 2 TABLETS BY MOUTH EVERY EVENING 180 Tab 0   • losartan-hydrochlorothiazide (HYZAAR) 100-12.5 MG per tablet TAKE 1 TABLET BY MOUTH EVERY DAY 90 Tab 0     No current facility-administered medications for this visit.         Family History   Problem Relation Age of Onset   • Stroke Mother    • Hypertension Mother    • Diabetes Father    • Hypertension Father    • Prostate cancer Father         with mets   •  "Hyperlipidemia Father    • Heart Failure Father    • Hypertension Sister    • Diabetes Sister    • Hyperlipidemia Sister    • Arthritis Sister         autoimmune   • Diabetes Brother    • Hypertension Brother        Social History     Socioeconomic History   • Marital status:      Spouse name: Not on file   • Number of children: 0   • Years of education: Not on file   • Highest education level: Not on file   Occupational History   • Occupation: [employer]    Social Needs   • Financial resource strain: Not on file   • Food insecurity     Worry: Not on file     Inability: Not on file   • Transportation needs     Medical: Not on file     Non-medical: Not on file   Tobacco Use   • Smoking status: Current Every Day Smoker     Packs/day: 1.00     Years: 28.00     Pack years: 28.00     Types: Cigarettes   • Smokeless tobacco: Never Used   Substance and Sexual Activity   • Alcohol use: Yes     Alcohol/week: 0.0 oz     Comment: occasional   • Drug use: No   • Sexual activity: Yes     Partners: Male   Lifestyle   • Physical activity     Days per week: Not on file     Minutes per session: Not on file   • Stress: Not on file   Relationships   • Social connections     Talks on phone: Not on file     Gets together: Not on file     Attends Church service: Not on file     Active member of club or organization: Not on file     Attends meetings of clubs or organizations: Not on file     Relationship status: Not on file   • Intimate partner violence     Fear of current or ex partner: Not on file     Emotionally abused: Not on file     Physically abused: Not on file     Forced sexual activity: Not on file   Other Topics Concern   • Not on file   Social History Narrative   • Not on file        ROS     As per HPI, the rest are negative.       Objective:     /80 (BP Location: Left arm, Patient Position: Sitting, BP Cuff Size: Adult)   Pulse 90   Temp 36.6 °C (97.9 °F) (Temporal)   Ht 1.651 m (5' 5\")   Wt " 71.1 kg (156 lb 12 oz)   SpO2 97%   BMI 26.08 kg/m²      Physical Exam  Vitals signs and nursing note reviewed.   Constitutional:       General: She is not in acute distress.     Appearance: She is well-developed. She is not diaphoretic.   HENT:      Head: Normocephalic and atraumatic.      Right Ear: External ear normal.      Left Ear: External ear normal.      Nose: Nose normal.      Mouth/Throat:      Pharynx: No oropharyngeal exudate.   Eyes:      General: No scleral icterus.        Right eye: No discharge.         Left eye: No discharge.      Conjunctiva/sclera: Conjunctivae normal.      Pupils: Pupils are equal, round, and reactive to light.   Neck:      Musculoskeletal: Normal range of motion and neck supple.      Thyroid: No thyromegaly.      Trachea: No tracheal deviation.   Cardiovascular:      Rate and Rhythm: Normal rate and regular rhythm.      Heart sounds: Normal heart sounds. No murmur. No gallop.    Pulmonary:      Effort: Pulmonary effort is normal. No respiratory distress.      Breath sounds: Normal breath sounds. No stridor. No wheezing or rales.   Chest:      Breasts: Breasts are symmetrical.         Right: No inverted nipple, mass, nipple discharge, skin change or tenderness.         Left: No inverted nipple, mass, nipple discharge, skin change or tenderness.       Abdominal:      General: Bowel sounds are normal. There is no distension.      Palpations: Abdomen is soft. There is no mass.      Tenderness: There is no abdominal tenderness. There is no guarding or rebound.      Hernia: There is no hernia in the left inguinal area or right inguinal area.   Genitourinary:     General: Normal vulva.      Exam position: Supine.      Labia:         Right: No rash.       Vagina: Normal. No vaginal discharge.      Cervix: No cervical motion tenderness, discharge or friability.      Uterus: Not deviated, not enlarged, not fixed and not tender.       Adnexa:         Right: No mass, tenderness or  fullness.          Left: No mass, tenderness or fullness.     Musculoskeletal: Normal range of motion.         General: No tenderness.   Lymphadenopathy:      Cervical: No cervical adenopathy.      Lower Body: No right inguinal adenopathy. No left inguinal adenopathy.   Skin:     General: Skin is warm.      Coloration: Skin is not pale.      Findings: No erythema or rash.   Neurological:      Mental Status: She is alert and oriented to person, place, and time.      Cranial Nerves: No cranial nerve deficit.      Motor: No abnormal muscle tone.      Coordination: Coordination normal.      Deep Tendon Reflexes: Reflexes normal.   Psychiatric:         Behavior: Behavior normal.         Thought Content: Thought content normal.          Hospital Outpatient Visit on 12/10/2020   Component Date Value Ref Range Status   • Hepatitis B Core Ab, Total 12/10/2020 Reactive* Non-Reactive Final    Comment: Presumptive evidence of antibodies to HBc. Follow CDC recommendations for  supplemental testing.  The Roche HBc Total is a diagnostic test for the qualitative determination  of total antibodies to the hepatitis B core antigen in human serum and  plasma. The results should be used and interpreted only in the context of  the overall clinical picture. A negative test result does not exclude the  possibility of exposure to hepatitis B virus.  Note: Assay performance characteristics have not been established in  patients under the age of 21, pregnant women, or in populations of  immunocompromised or immunosuppressed patients.     • Hepatitis Be Antigen 12/10/2020 Negative  Negative Final    Comment: Performed by [facility],  [address] [place] [id number] [phone]  [email or web address], Sahara Mullen MD - Lab. Director     • Hep B Surface Antibody Quant 12/10/2020 <3.50  0.00 - 10.00 mIU/mL Final    Comment: Individual is considered to be not immune to infection with HBV.  Reference Interval: anti-HBs  < 8.5  mIU/mL..........Negative.  8.5 - 11.4 mIU/mL..........Indeterminate.  = 11.5 mIU/mL..........Positive.  Assay performance characteristics have not been established when the Elecsys  Anti HBs assay is used in conjunction with other manufacturers' assays for  specific HBV serological markers.  For post-vaccination antibody testing guidelines for the general public,  refer to MMWR December 23, 2005/Vol. 54 (No.16);1-23, and for healthcare  workers, refer to MMWR December 20, 2013/Vol.62(No. 10);1-19.     • Hepatitis B Surface Antigen 12/10/2020 Reactive* Non-Reactive Final    Comment: Presumptive evidence of HBV. Repeatedly reactive samples must be confirmed.  It has been reported that certain assays will not detect all HBV mutants.  If acute or chronic HBV infection is suspected and the HBsAg result is  negative, it is recommended that other serological markers be tested to  confirm the HBsAg nonreactivity.  HBsAg test results should always be  assessed in conjunction with the patient's medical history, clinical  examination, and other findings.  Note: Assay performance characteristics have not been established when the  Elecsys HBsAg II assay is used in conjunction with other manufacturers'  assays for specific HBV serological markers. Assay performance  characteristics have not been established for testing of newborns.     • 25-Hydroxy   Vitamin D 25 12/10/2020 37  30 - 100 ng/mL Final    Comment: Adult Ranges:   <20 ng/mL - Deficiency  20-29 ng/mL - Insufficiency   ng/mL - Sufficiency  Effective 3/18/2020, this electrochemiluminescence binding assay is  performed using Roche fannie e immunoassay analyzer.  The Elecsys Vitamin D  total II assay is intended for the quantitative determination of total 25  hydroxyvitamin D in human serum and plasma. This assay is to be used as an  aid in the assessment of vitamin D sufficiency in adults.     • Creatinine, Urine 12/10/2020 128.16  mg/dL Final   • Microalbumin,  Urine Random 12/10/2020 <1.2  mg/dL Final   • Micro Alb Creat Ratio 12/10/2020 see below  0 - 30 mg/g Final    Comment: Unable to calculate the microalbumin/creatinine ratio due to  the microalbumin result or the urine creatinine result being  outside the measurement range of the analyzer.     • WBC 12/10/2020 8.4  4.8 - 10.8 K/uL Final   • RBC 12/10/2020 5.01  4.20 - 5.40 M/uL Final   • Hemoglobin 12/10/2020 14.5  12.0 - 16.0 g/dL Final   • Hematocrit 12/10/2020 45.8  37.0 - 47.0 % Final   • MCV 12/10/2020 91.4  81.4 - 97.8 fL Final   • MCH 12/10/2020 28.9  27.0 - 33.0 pg Final   • MCHC 12/10/2020 31.7* 33.6 - 35.0 g/dL Final   • RDW 12/10/2020 48.1  35.9 - 50.0 fL Final   • Platelet Count 12/10/2020 429  164 - 446 K/uL Final   • MPV 12/10/2020 9.3  9.0 - 12.9 fL Final   • Neutrophils-Polys 12/10/2020 53.50  44.00 - 72.00 % Final   • Lymphocytes 12/10/2020 34.40  22.00 - 41.00 % Final   • Monocytes 12/10/2020 8.00  0.00 - 13.40 % Final   • Eosinophils 12/10/2020 3.10  0.00 - 6.90 % Final   • Basophils 12/10/2020 0.60  0.00 - 1.80 % Final   • Immature Granulocytes 12/10/2020 0.40  0.00 - 0.90 % Final   • Nucleated RBC 12/10/2020 0.00  /100 WBC Final   • Neutrophils (Absolute) 12/10/2020 4.51  2.00 - 7.15 K/uL Final    Includes immature neutrophils, if present.   • Lymphs (Absolute) 12/10/2020 2.89  1.00 - 4.80 K/uL Final   • Monos (Absolute) 12/10/2020 0.67  0.00 - 0.85 K/uL Final   • Eos (Absolute) 12/10/2020 0.26  0.00 - 0.51 K/uL Final   • Baso (Absolute) 12/10/2020 0.05  0.00 - 0.12 K/uL Final   • Immature Granulocytes (abs) 12/10/2020 0.03  0.00 - 0.11 K/uL Final   • NRBC (Absolute) 12/10/2020 0.00  K/uL Final   • Glycohemoglobin 12/10/2020 6.5* 0.0 - 5.6 % Final    Comment: Increased risk for diabetes:  5.7 -6.4%  Diabetes:  >6.4%  Glycemic control for adults with diabetes:  <7.0%  The above interpretations are per ADA guidelines.  Diagnosis  of diabetes mellitus on the basis of elevated Hemoglobin A1c  should  be confirmed by repeating the Hb A1c test.     • Est Avg Glucose 12/10/2020 140  mg/dL Final    Comment: The eAG calculation is based on the A1c-Derived Daily Glucose  (ADAG) study.  See the ADA's website for additional information.     • Sodium 12/10/2020 134* 135 - 145 mmol/L Final   • Potassium 12/10/2020 3.9  3.6 - 5.5 mmol/L Final   • Chloride 12/10/2020 99  96 - 112 mmol/L Final   • Co2 12/10/2020 25  20 - 33 mmol/L Final   • Anion Gap 12/10/2020 10.0  7.0 - 16.0 Final   • Glucose 12/10/2020 147* 65 - 99 mg/dL Final   • Bun 12/10/2020 17  8 - 22 mg/dL Final   • Creatinine 12/10/2020 0.66  0.50 - 1.40 mg/dL Final   • Calcium 12/10/2020 9.4  8.5 - 10.5 mg/dL Final   • AST(SGOT) 12/10/2020 18  12 - 45 U/L Final   • ALT(SGPT) 12/10/2020 26  2 - 50 U/L Final   • Alkaline Phosphatase 12/10/2020 52  30 - 99 U/L Final   • Total Bilirubin 12/10/2020 0.3  0.1 - 1.5 mg/dL Final   • Albumin 12/10/2020 4.4  3.2 - 4.9 g/dL Final   • Total Protein 12/10/2020 7.7  6.0 - 8.2 g/dL Final   • Globulin 12/10/2020 3.3  1.9 - 3.5 g/dL Final   • A-G Ratio 12/10/2020 1.3  g/dL Final   • Cholesterol,Tot 12/10/2020 128  100 - 199 mg/dL Final   • Triglycerides 12/10/2020 387* 0 - 149 mg/dL Final   • HDL 12/10/2020 21* >=40 mg/dL Final   • LDL 12/10/2020 30  <100 mg/dL Final   • GFR If  12/10/2020 >60  >60 mL/min/1.73 m 2 Final   • GFR If Non  12/10/2020 >60  >60 mL/min/1.73 m 2 Final                 Assessment/Plan:        1. Encounter for routine gynecological examination with Papanicolaou smear of cervix  Complete exam done.  Pap smear was done.  Specimen was packaged and sent to the lab.  She has palpable mass left upper quadrant which was already evaluated with a diagnostic mammogram and ultrasound in July 2020 the showed 10 mm cyst.  She will need screening mammogram this coming July.  - THINPREP PAP WITH HPV; Future    2. Screening for human papillomavirus (HPV)  Screening for HPV was done.  -  THINPREP PAP WITH HPV; Future    3. Hepatitis B virus infection, unspecified chronicity  Her blood work came back positive for hepatitis B surface antigen, negative hepatitis B surface antibody and positive hepatitis B core antibody.  Hepatitis B E antigen was negative.  We are waiting for the viral load.  I will do an updated referral to GI specialist.  This time she said she will follow through.  - REFERRAL TO GASTROENTEROLOGY    4. Breast cyst, left  This was already evaluated with diagnostic mammogram and ultrasound.  Recommendation is to do yearly screening mammogram which will be due this July 2020.    5. Diabetes mellitus without complication (HCC)  Adequately controlled.  Continue current medications.  - Lipid Profile; Future  - Comp Metabolic Panel; Future  - HEMOGLOBIN A1C; Future    6. Dyslipidemia  Triglycerides are higher than before and now in the 300s.  HDL still running low in the 20s.  LDL at goal.  Continue fenofibrate.  Advised work hard on avoidance of sweets, decreasing the carbs, regular exercises with cardio exercises 30 minutes a day 4 to 5 days a week.  Recheck blood work in 4 months.  - Lipid Profile; Future  - Comp Metabolic Panel; Future  - HEMOGLOBIN A1C; Future    7. Vitamin D deficiency  Vitamin D is adequately replaced.  Continue the same dose of vitamin D supplementation.    Follow-up in 4 months.

## 2020-12-16 DIAGNOSIS — Z01.419 ENCOUNTER FOR ROUTINE GYNECOLOGICAL EXAMINATION WITH PAPANICOLAOU SMEAR OF CERVIX: ICD-10-CM

## 2020-12-16 DIAGNOSIS — Z11.51 SCREENING FOR HUMAN PAPILLOMAVIRUS (HPV): ICD-10-CM

## 2020-12-16 LAB
HBV DNA SERPL NAA+PROBE-ACNC: ABNORMAL [IU]/ML
HBV DNA SERPL NAA+PROBE-LOG IU: 3.63 LOG IU/ML
HBV DNA SERPL QL NAA+PROBE: DETECTED
HBV SURFACE AG SERPL QL NT: POSITIVE

## 2020-12-17 DIAGNOSIS — E78.5 DYSLIPIDEMIA: ICD-10-CM

## 2020-12-17 DIAGNOSIS — I10 ESSENTIAL HYPERTENSION: ICD-10-CM

## 2020-12-17 DIAGNOSIS — E11.9 DIABETES MELLITUS WITHOUT COMPLICATION (HCC): ICD-10-CM

## 2020-12-17 RX ORDER — LOSARTAN POTASSIUM AND HYDROCHLOROTHIAZIDE 12.5; 1 MG/1; MG/1
TABLET ORAL
Qty: 90 TAB | Refills: 1 | Status: SHIPPED | OUTPATIENT
Start: 2020-12-17 | End: 2021-06-21

## 2020-12-17 RX ORDER — FENOFIBRATE 145 MG/1
TABLET, COATED ORAL
Qty: 90 TAB | Refills: 1 | Status: SHIPPED | OUTPATIENT
Start: 2020-12-17 | End: 2021-06-21

## 2020-12-17 RX ORDER — METFORMIN HYDROCHLORIDE 500 MG/1
TABLET, EXTENDED RELEASE ORAL
Qty: 180 TAB | Refills: 1 | Status: SHIPPED | OUTPATIENT
Start: 2020-12-17 | End: 2021-06-21

## 2021-06-03 ENCOUNTER — HOSPITAL ENCOUNTER (OUTPATIENT)
Dept: LAB | Facility: MEDICAL CENTER | Age: 46
End: 2021-06-03
Attending: FAMILY MEDICINE
Payer: COMMERCIAL

## 2021-06-03 DIAGNOSIS — E11.9 DIABETES MELLITUS WITHOUT COMPLICATION (HCC): ICD-10-CM

## 2021-06-03 DIAGNOSIS — E78.5 DYSLIPIDEMIA: ICD-10-CM

## 2021-06-03 LAB
ALBUMIN SERPL BCP-MCNC: 4.3 G/DL (ref 3.2–4.9)
ALBUMIN/GLOB SERPL: 1.2 G/DL
ALP SERPL-CCNC: 53 U/L (ref 30–99)
ALT SERPL-CCNC: 15 U/L (ref 2–50)
ANION GAP SERPL CALC-SCNC: 11 MMOL/L (ref 7–16)
AST SERPL-CCNC: 17 U/L (ref 12–45)
BILIRUB SERPL-MCNC: 0.2 MG/DL (ref 0.1–1.5)
BUN SERPL-MCNC: 19 MG/DL (ref 8–22)
CALCIUM SERPL-MCNC: 9.4 MG/DL (ref 8.5–10.5)
CHLORIDE SERPL-SCNC: 103 MMOL/L (ref 96–112)
CHOLEST SERPL-MCNC: 122 MG/DL (ref 100–199)
CO2 SERPL-SCNC: 24 MMOL/L (ref 20–33)
CREAT SERPL-MCNC: 0.73 MG/DL (ref 0.5–1.4)
EST. AVERAGE GLUCOSE BLD GHB EST-MCNC: 140 MG/DL
FASTING STATUS PATIENT QL REPORTED: NORMAL
GLOBULIN SER CALC-MCNC: 3.5 G/DL (ref 1.9–3.5)
GLUCOSE SERPL-MCNC: 147 MG/DL (ref 65–99)
HBA1C MFR BLD: 6.5 % (ref 4–5.6)
HDLC SERPL-MCNC: 18 MG/DL
LDLC SERPL CALC-MCNC: 47 MG/DL
POTASSIUM SERPL-SCNC: 4.3 MMOL/L (ref 3.6–5.5)
PROT SERPL-MCNC: 7.8 G/DL (ref 6–8.2)
SODIUM SERPL-SCNC: 138 MMOL/L (ref 135–145)
TRIGL SERPL-MCNC: 284 MG/DL (ref 0–149)

## 2021-06-03 PROCEDURE — 80061 LIPID PANEL: CPT

## 2021-06-03 PROCEDURE — 80053 COMPREHEN METABOLIC PANEL: CPT

## 2021-06-03 PROCEDURE — 36415 COLL VENOUS BLD VENIPUNCTURE: CPT

## 2021-06-03 PROCEDURE — 83036 HEMOGLOBIN GLYCOSYLATED A1C: CPT

## 2021-06-19 DIAGNOSIS — I10 ESSENTIAL HYPERTENSION: ICD-10-CM

## 2021-06-19 DIAGNOSIS — E78.5 DYSLIPIDEMIA: ICD-10-CM

## 2021-06-19 DIAGNOSIS — E11.9 DIABETES MELLITUS WITHOUT COMPLICATION (HCC): ICD-10-CM

## 2021-06-21 RX ORDER — FENOFIBRATE 145 MG/1
TABLET, COATED ORAL
Qty: 90 TABLET | Refills: 0 | Status: SHIPPED | OUTPATIENT
Start: 2021-06-21 | End: 2021-08-22

## 2021-06-21 RX ORDER — LOSARTAN POTASSIUM AND HYDROCHLOROTHIAZIDE 12.5; 1 MG/1; MG/1
TABLET ORAL
Qty: 90 TABLET | Refills: 0 | Status: SHIPPED | OUTPATIENT
Start: 2021-06-21 | End: 2021-08-22

## 2021-06-21 RX ORDER — METFORMIN HYDROCHLORIDE 500 MG/1
TABLET, EXTENDED RELEASE ORAL
Qty: 180 TABLET | Refills: 0 | Status: SHIPPED | OUTPATIENT
Start: 2021-06-21 | End: 2021-08-22

## 2021-06-21 NOTE — TELEPHONE ENCOUNTER
Received request via: Pharmacy    Was the patient seen in the last year in this department? Yes    Does the patient have an active prescription (recently filled or refills available) for medication(s) requested? No   no syncope/no chest pain/no peripheral edema

## 2021-06-24 ENCOUNTER — TELEMEDICINE (OUTPATIENT)
Dept: MEDICAL GROUP | Facility: PHYSICIAN GROUP | Age: 46
End: 2021-06-24
Payer: COMMERCIAL

## 2021-06-24 VITALS — BODY MASS INDEX: 26.08 KG/M2 | HEIGHT: 65 IN

## 2021-06-24 DIAGNOSIS — E78.5 DYSLIPIDEMIA: ICD-10-CM

## 2021-06-24 DIAGNOSIS — E11.9 DIABETES MELLITUS WITHOUT COMPLICATION (HCC): ICD-10-CM

## 2021-06-24 DIAGNOSIS — I10 ESSENTIAL HYPERTENSION: ICD-10-CM

## 2021-06-24 DIAGNOSIS — B18.1 CHRONIC HEPATITIS B (HCC): ICD-10-CM

## 2021-06-24 PROCEDURE — 99214 OFFICE O/P EST MOD 30 MIN: CPT | Mod: 95,CR | Performed by: FAMILY MEDICINE

## 2021-06-25 NOTE — PROGRESS NOTES
Virtual Visit: Established Patient   This visit was conducted via [employer] using secure and encrypted videoconferencing technology. The patient was in a private location in the state of [place].    The patient's identity was confirmed and verbal consent was obtained for this virtual visit.    Subjective:   CC:   Chief Complaint   Patient presents with   • Diabetes       Ita Johnson is a 45 y.o. female presenting for evaluation and management of:    Patient is here for follow-up of her medical problems.    In terms of her diabetes mellitus type 2, she continues to take Metformin  mg 2 tablets daily regularly without side effects.    In terms of the hypertension, this is under control on losartan/HCTZ per previous blood pressure reading in the office.  She did not get her blood pressure reading today at home.  She has not been monitoring her blood pressure either regularly at home.    For her dyslipidemia, she continues to take fenofibric without side effects.    She was seen and evaluated by the gastroenterologist for chronic hepatitis B.  She was diagnosed with immune tolerant hepatitis B.  This was thought to be due to vertical transmission from her mother most likely.  Treatment was not indicated and recommendation is to do blood work every 6 months.  Her liver enzymes have remained normal.  She was advised to follow-up yearly.  Denies any GI symptoms.    ROS   Denies any recent fevers or chills. No nausea or vomiting. No chest pains or shortness of breath.     No Known Allergies    Current medicines (including changes today)  Current Outpatient Medications   Medication Sig Dispense Refill   • fenofibrate (TRICOR) 145 MG Tab TAKE 1 TABLET BY MOUTH EVERY DAY 90 tablet 0   • losartan-hydrochlorothiazide (HYZAAR) 100-12.5 MG per tablet TAKE 1 TABLET BY MOUTH EVERY DAY 90 tablet 0   • metFORMIN ER (GLUCOPHAGE XR) 500 MG TABLET SR 24 HR TAKE 2 TABLETS BY MOUTH EVERY EVENING 180 tablet 0     No current  "facility-administered medications for this visit.       Patient Active Problem List    Diagnosis Date Noted   • Calcific tendinitis of right shoulder 02/21/2019   • DM type 2 (diabetes mellitus, type 2) (HCC)    • Dyslipidemia    • HTN (hypertension)    • Vitamin D deficiency        Family History   Problem Relation Age of Onset   • Stroke Mother    • Hypertension Mother    • Diabetes Father    • Hypertension Father    • Prostate cancer Father         with mets   • Hyperlipidemia Father    • Heart Failure Father    • Hypertension Sister    • Diabetes Sister    • Hyperlipidemia Sister    • Arthritis Sister         autoimmune   • Diabetes Brother    • Hypertension Brother        She  has a past medical history of DM type 2 (diabetes mellitus, type 2) (HCC), Dyslipidemia, HTN (hypertension), and Vitamin D deficiency.  She  has no past surgical history on file.       Objective:   Ht 1.651 m (5' 5\")   BMI 26.08 kg/m²     Physical Exam:  Constitutional: Alert, no distress, well-groomed.  Skin: No rashes in visible areas.  Eye: Round. Conjunctiva clear, lids normal. No icterus.   ENMT: Lips pink without lesions, good dentition, moist mucous membranes. Phonation normal.  Neck: No masses, no thyromegaly. Moves freely without pain.  Respiratory: Unlabored respiratory effort, no cough or audible wheeze  Psych: Alert and oriented x3, normal affect and mood.     Hospital Outpatient Visit on 06/03/2021   Component Date Value Ref Range Status   • Glycohemoglobin 06/03/2021 6.5* 4.0 - 5.6 % Final    Comment: Increased risk for diabetes:  5.7 -6.4%  Diabetes:  >6.4%  Glycemic control for adults with diabetes:  <7.0%    The above interpretations are per ADA guidelines.  Diagnosis  of diabetes mellitus on the basis of elevated Hemoglobin A1c  should be confirmed by repeating the Hb A1c test.     • Est Avg Glucose 06/03/2021 140  mg/dL Final    Comment: The eAG calculation is based on the A1c-Derived Daily Glucose  (ADAG) study.  See " the ADA's website for additional information.     • Sodium 06/03/2021 138  135 - 145 mmol/L Final   • Potassium 06/03/2021 4.3  3.6 - 5.5 mmol/L Final   • Chloride 06/03/2021 103  96 - 112 mmol/L Final   • Co2 06/03/2021 24  20 - 33 mmol/L Final   • Anion Gap 06/03/2021 11.0  7.0 - 16.0 Final   • Glucose 06/03/2021 147* 65 - 99 mg/dL Final   • Bun 06/03/2021 19  8 - 22 mg/dL Final   • Creatinine 06/03/2021 0.73  0.50 - 1.40 mg/dL Final   • Calcium 06/03/2021 9.4  8.5 - 10.5 mg/dL Final   • AST(SGOT) 06/03/2021 17  12 - 45 U/L Final   • ALT(SGPT) 06/03/2021 15  2 - 50 U/L Final   • Alkaline Phosphatase 06/03/2021 53  30 - 99 U/L Final   • Total Bilirubin 06/03/2021 0.2  0.1 - 1.5 mg/dL Final   • Albumin 06/03/2021 4.3  3.2 - 4.9 g/dL Final   • Total Protein 06/03/2021 7.8  6.0 - 8.2 g/dL Final   • Globulin 06/03/2021 3.5  1.9 - 3.5 g/dL Final   • A-G Ratio 06/03/2021 1.2  g/dL Final   • Cholesterol,Tot 06/03/2021 122  100 - 199 mg/dL Final   • Triglycerides 06/03/2021 284* 0 - 149 mg/dL Final   • HDL 06/03/2021 18* >=40 mg/dL Final   • LDL 06/03/2021 47  <100 mg/dL Final   • Fasting Status 06/03/2021 Fasting   Final   • GFR If  06/03/2021 >60  >60 mL/min/1.73 m 2 Final   • GFR If Non  06/03/2021 >60  >60 mL/min/1.73 m 2 Final         Assessment and Plan:   The following treatment plan was discussed:     1. Diabetes mellitus without complication (HCC)  - Lipid Profile; Future  - Comp Metabolic Panel; Future  - HEMOGLOBIN A1C; Future  - MICROALBUMIN CREAT RATIO URINE; Future    2. Essential hypertension  - Lipid Profile; Future  - Comp Metabolic Panel; Future  - HEMOGLOBIN A1C; Future  - MICROALBUMIN CREAT RATIO URINE; Future    3. Dyslipidemia  - Lipid Profile; Future  - Comp Metabolic Panel; Future  - HEMOGLOBIN A1C; Future  - MICROALBUMIN CREAT RATIO URINE; Future    4. Chronic hepatitis B (HCC)  - Lipid Profile; Future  - Comp Metabolic Panel; Future  - HEMOGLOBIN A1C; Future  -  MICROALBUMIN CREAT RATIO URINE; Future    1.  Hemoglobin A1c 6.5 consistent with good control diabetes.  Continue current dose of Metformin.  Continue watching the diet, regular exercises and weight loss.  We will recheck in 4 months.  Advised to come in person so we can do her foot exam.  2.  Controlled on her medications per blood pressure readings in the office.  Advised to monitor her blood pressure at home.  We will check her blood pressure when she comes into the office next visit.  3.  Triglycerides are still high.  Advised to continue taking her fenofibrate.  Advised avoidance of sweets and decreasing the carbs to improve the triglycerides.  The HDL is even lower than before and now in the teens.  They were previously in the 20s.  Advised work harder on regular exercises with cardio exercises 30 minutes a day 4 to 5 days a week.  4.  Liver enzymes all within normal limits.  She will see the GI specialist yearly per recommendation.  We will have blood work done to check liver enzymes regularly every 4 to 6 months.    Follow-up: Return in about 4 months (around 10/24/2021).        Please note that this dictation was created using voice recognition software. I have worked with consultants from the vendor as well as technical experts from [employer] to optimize the interface. I have made every reasonable attempt to correct obvious errors, but I expect that there are errors of grammar and possibly content I did not discover before finalizing the note.

## 2021-10-11 ENCOUNTER — APPOINTMENT (OUTPATIENT)
Dept: RADIOLOGY | Facility: MEDICAL CENTER | Age: 46
End: 2021-10-11
Attending: FAMILY MEDICINE
Payer: COMMERCIAL

## 2021-10-11 ENCOUNTER — APPOINTMENT (OUTPATIENT)
Dept: LAB | Facility: MEDICAL CENTER | Age: 46
End: 2021-10-11
Payer: COMMERCIAL

## 2021-10-20 ENCOUNTER — HOSPITAL ENCOUNTER (OUTPATIENT)
Dept: RADIOLOGY | Facility: MEDICAL CENTER | Age: 46
End: 2021-10-20
Attending: FAMILY MEDICINE
Payer: COMMERCIAL

## 2021-10-20 ENCOUNTER — HOSPITAL ENCOUNTER (OUTPATIENT)
Dept: LAB | Facility: MEDICAL CENTER | Age: 46
End: 2021-10-20
Attending: FAMILY MEDICINE
Payer: COMMERCIAL

## 2021-10-20 DIAGNOSIS — B18.1 CHRONIC HEPATITIS B (HCC): ICD-10-CM

## 2021-10-20 DIAGNOSIS — E78.5 DYSLIPIDEMIA: ICD-10-CM

## 2021-10-20 DIAGNOSIS — Z12.31 VISIT FOR SCREENING MAMMOGRAM: ICD-10-CM

## 2021-10-20 DIAGNOSIS — I10 ESSENTIAL HYPERTENSION: ICD-10-CM

## 2021-10-20 DIAGNOSIS — E11.9 DIABETES MELLITUS WITHOUT COMPLICATION (HCC): ICD-10-CM

## 2021-10-20 LAB
ALBUMIN SERPL BCP-MCNC: 4.3 G/DL (ref 3.2–4.9)
ALBUMIN/GLOB SERPL: 1.4 G/DL
ALP SERPL-CCNC: 54 U/L (ref 30–99)
ALT SERPL-CCNC: 37 U/L (ref 2–50)
ANION GAP SERPL CALC-SCNC: 12 MMOL/L (ref 7–16)
AST SERPL-CCNC: 32 U/L (ref 12–45)
BILIRUB SERPL-MCNC: 0.2 MG/DL (ref 0.1–1.5)
BUN SERPL-MCNC: 15 MG/DL (ref 8–22)
CALCIUM SERPL-MCNC: 9 MG/DL (ref 8.5–10.5)
CHLORIDE SERPL-SCNC: 102 MMOL/L (ref 96–112)
CHOLEST SERPL-MCNC: 167 MG/DL (ref 100–199)
CO2 SERPL-SCNC: 22 MMOL/L (ref 20–33)
CREAT SERPL-MCNC: 0.49 MG/DL (ref 0.5–1.4)
CREAT UR-MCNC: 98.9 MG/DL
EST. AVERAGE GLUCOSE BLD GHB EST-MCNC: 140 MG/DL
FASTING STATUS PATIENT QL REPORTED: NORMAL
GLOBULIN SER CALC-MCNC: 3.1 G/DL (ref 1.9–3.5)
GLUCOSE SERPL-MCNC: 147 MG/DL (ref 65–99)
HBA1C MFR BLD: 6.5 % (ref 4–5.6)
HDLC SERPL-MCNC: 23 MG/DL
LDLC SERPL CALC-MCNC: ABNORMAL MG/DL
MICROALBUMIN UR-MCNC: <1.2 MG/DL
MICROALBUMIN/CREAT UR: NORMAL MG/G (ref 0–30)
POTASSIUM SERPL-SCNC: 3.9 MMOL/L (ref 3.6–5.5)
PROT SERPL-MCNC: 7.4 G/DL (ref 6–8.2)
SODIUM SERPL-SCNC: 136 MMOL/L (ref 135–145)
TRIGL SERPL-MCNC: 514 MG/DL (ref 0–149)

## 2021-10-20 PROCEDURE — 83036 HEMOGLOBIN GLYCOSYLATED A1C: CPT

## 2021-10-20 PROCEDURE — 36415 COLL VENOUS BLD VENIPUNCTURE: CPT

## 2021-10-20 PROCEDURE — 82570 ASSAY OF URINE CREATININE: CPT

## 2021-10-20 PROCEDURE — 80053 COMPREHEN METABOLIC PANEL: CPT

## 2021-10-20 PROCEDURE — 80061 LIPID PANEL: CPT

## 2021-10-20 PROCEDURE — 77063 BREAST TOMOSYNTHESIS BI: CPT

## 2021-10-20 PROCEDURE — 82043 UR ALBUMIN QUANTITATIVE: CPT

## 2021-11-01 ENCOUNTER — OFFICE VISIT (OUTPATIENT)
Dept: MEDICAL GROUP | Facility: PHYSICIAN GROUP | Age: 46
End: 2021-11-01
Payer: COMMERCIAL

## 2021-11-01 VITALS
HEART RATE: 73 BPM | DIASTOLIC BLOOD PRESSURE: 60 MMHG | HEIGHT: 65 IN | SYSTOLIC BLOOD PRESSURE: 100 MMHG | BODY MASS INDEX: 25.38 KG/M2 | TEMPERATURE: 98.3 F | WEIGHT: 152.34 LBS | OXYGEN SATURATION: 96 %

## 2021-11-01 DIAGNOSIS — Z23 NEED FOR IMMUNIZATION AGAINST INFLUENZA: ICD-10-CM

## 2021-11-01 DIAGNOSIS — E78.5 DYSLIPIDEMIA: ICD-10-CM

## 2021-11-01 DIAGNOSIS — I10 ESSENTIAL HYPERTENSION: ICD-10-CM

## 2021-11-01 DIAGNOSIS — E11.9 DIABETES MELLITUS WITHOUT COMPLICATION (HCC): ICD-10-CM

## 2021-11-01 DIAGNOSIS — F17.200 TOBACCO DEPENDENCE: ICD-10-CM

## 2021-11-01 DIAGNOSIS — B18.1 CHRONIC HEPATITIS B (HCC): ICD-10-CM

## 2021-11-01 PROCEDURE — 90471 IMMUNIZATION ADMIN: CPT | Performed by: FAMILY MEDICINE

## 2021-11-01 PROCEDURE — 99214 OFFICE O/P EST MOD 30 MIN: CPT | Mod: 25 | Performed by: FAMILY MEDICINE

## 2021-11-01 PROCEDURE — 90686 IIV4 VACC NO PRSV 0.5 ML IM: CPT | Performed by: FAMILY MEDICINE

## 2021-11-01 RX ORDER — VARENICLINE TARTRATE 1 MG/1
1 TABLET, FILM COATED ORAL 2 TIMES DAILY
Qty: 60 TABLET | Refills: 1 | Status: SHIPPED | OUTPATIENT
Start: 2021-11-01 | End: 2021-12-26

## 2021-11-01 RX ORDER — OMEGA-3-ACID ETHYL ESTERS 1 G/1
2 CAPSULE, LIQUID FILLED ORAL 2 TIMES DAILY
Qty: 120 CAPSULE | Refills: 5 | Status: SHIPPED | OUTPATIENT
Start: 2021-11-01 | End: 2021-12-26

## 2021-11-01 RX ORDER — OMEGA-3-ACID ETHYL ESTERS 1 G/1
2 CAPSULE, LIQUID FILLED ORAL 2 TIMES DAILY
Qty: 120 CAPSULE | Refills: 5 | Status: SHIPPED | OUTPATIENT
Start: 2021-11-01 | End: 2021-11-01 | Stop reason: SDUPTHER

## 2021-11-01 ASSESSMENT — PATIENT HEALTH QUESTIONNAIRE - PHQ9: CLINICAL INTERPRETATION OF PHQ2 SCORE: 0

## 2021-11-01 ASSESSMENT — FIBROSIS 4 INDEX: FIB4 SCORE: 0.55

## 2021-11-02 NOTE — PROGRESS NOTES
"Subjective     Ita Johnson is a 46 y.o. female who presents with Diabetes            HPI     Patient returns for follow-up of her medical problems.    In terms of her diabetes mellitus type 2, she continues to take Metformin  mg 2 tablets daily without side effects.    For her hypertension, this is under control on losartan/HCTZ without side effects.    She continues to take TriCor 145 mg daily for dyslipidemia with elevated triglycerides.    For her chronic hepatitis B, he was seen by GI specialist as initial visit in January 2021.  She was diagnosed with chronic hepatitis B immune tolerant.  She was sent for additional blood work which she has not done.  She said she moved to a different house and so she probably did not get the orders that they sent to her.  Denies any GI symptoms.    She continues to smoke cigarettes 1 pack/day and she said she has been smoking for about 30 years now.     Past medical history, past surgical history, family history reviewed-no changes    Social history reviewed-no changes    Allergies reviewed-no changes    Medications reviewed-no changes    ROS     As per HPI, the rest are negative.           Objective     /60 (BP Location: Left arm, Patient Position: Sitting, BP Cuff Size: Adult)   Pulse 73   Temp 36.8 °C (98.3 °F) (Temporal)   Ht 1.651 m (5' 5\")   Wt 69.1 kg (152 lb 5.4 oz)   SpO2 96%   BMI 25.35 kg/m²      Physical Exam     Examined alert, awake, oriented, not in distress    Neck-supple, no lymphadenopathy, no thyromegaly  Lungs-clear to auscultation, no rales, no wheezes  Heart-regular rate and rhythm, no murmur  Extremities-no edema, clubbing, cyanosis\Monofilament testing with a 10 gram force: sensation intact: intact bilaterally  Visual Inspection: Feet without maceration, ulcers, fissures.  Pedal pulses: intact bilaterally             Hospital Outpatient Visit on 10/20/2021   Component Date Value Ref Range Status   • Creatinine, Urine 10/20/2021 " 98.90  mg/dL Final   • Microalbumin, Urine Random 10/20/2021 <1.2  mg/dL Final   • Micro Alb Creat Ratio 10/20/2021 see below  0 - 30 mg/g Final    Comment: Unable to calculate the microalbumin/creatinine ratio due to  the microalbumin result or the urine creatinine result being  outside the measurement range of the analyzer.     • Glycohemoglobin 10/20/2021 6.5* 4.0 - 5.6 % Final    Comment: Increased risk for diabetes:  5.7 -6.4%  Diabetes:  >6.4%  Glycemic control for adults with diabetes:  <7.0%    The above interpretations are per ADA guidelines.  Diagnosis  of diabetes mellitus on the basis of elevated Hemoglobin A1c  should be confirmed by repeating the Hb A1c test.     • Est Avg Glucose 10/20/2021 140  mg/dL Final    Comment: The eAG calculation is based on the A1c-Derived Daily Glucose  (ADAG) study.  See the ADA's website for additional information.     • Sodium 10/20/2021 136  135 - 145 mmol/L Final   • Potassium 10/20/2021 3.9  3.6 - 5.5 mmol/L Final   • Chloride 10/20/2021 102  96 - 112 mmol/L Final   • Co2 10/20/2021 22  20 - 33 mmol/L Final   • Anion Gap 10/20/2021 12.0  7.0 - 16.0 Final   • Glucose 10/20/2021 147* 65 - 99 mg/dL Final   • Bun 10/20/2021 15  8 - 22 mg/dL Final   • Creatinine 10/20/2021 0.49* 0.50 - 1.40 mg/dL Final   • Calcium 10/20/2021 9.0  8.5 - 10.5 mg/dL Final   • AST(SGOT) 10/20/2021 32  12 - 45 U/L Final   • ALT(SGPT) 10/20/2021 37  2 - 50 U/L Final   • Alkaline Phosphatase 10/20/2021 54  30 - 99 U/L Final   • Total Bilirubin 10/20/2021 0.2  0.1 - 1.5 mg/dL Final   • Albumin 10/20/2021 4.3  3.2 - 4.9 g/dL Final   • Total Protein 10/20/2021 7.4  6.0 - 8.2 g/dL Final   • Globulin 10/20/2021 3.1  1.9 - 3.5 g/dL Final   • A-G Ratio 10/20/2021 1.4  g/dL Final   • Cholesterol,Tot 10/20/2021 167  100 - 199 mg/dL Final   • Triglycerides 10/20/2021 514* 0 - 149 mg/dL Final   • HDL 10/20/2021 23* >=40 mg/dL Final   • LDL 10/20/2021 see below  <100 mg/dL Final    Comment: The calculated  LDL value is invalid due to the triglyceride  value of >400 mg/dL.     • Fasting Status 10/20/2021 Fasting   Final   • GFR If  10/20/2021 >60  >60 mL/min/1.73 m 2 Final   • GFR If Non  10/20/2021 >60  >60 mL/min/1.73 m 2 Final                  Assessment & Plan        1. Diabetes mellitus without complication (HCC)  Under control with hemoglobin A1c of 6.5.  Continue current dose of Metformin.  - Lipid Profile; Future  - Comp Metabolic Panel; Future  - HEMOGLOBIN A1C; Future  - CBC WITH DIFFERENTIAL; Future  - Diabetic Monofilament Lower Extremity Exam    2. Essential hypertension  Controlled on her medication.  - Lipid Profile; Future  - Comp Metabolic Panel; Future  - HEMOGLOBIN A1C; Future  - CBC WITH DIFFERENTIAL; Future    3. Dyslipidemia  Triglycerides are now significantly higher than before at 514.  LDL not measured.  HDL better but still needs to be 40 or higher.  She is already on maximum dose of fenofibrate 145 mg daily.  I will add Lovaza 1 g 2 capsules twice a day.  We will do updated blood work in 3 months.  Advised to work hard on avoidance of sweets and decreasing the carbs, regular exercises and weight loss.  We discussed potential risk of pancreatitis with elevated triglycerides.  - omega-3 acid ethyl esters (LOVAZA) 1 GM capsule; Take 2 Capsules by mouth 2 times a day.  Dispense: 120 Capsule; Refill: 5  - Lipid Profile; Future  - Comp Metabolic Panel; Future  - HEMOGLOBIN A1C; Future  - CBC WITH DIFFERENTIAL; Future    4. Chronic hepatitis B (HCC)  Was seen and evaluated by the gastroenterologist and impression is chronic hepatitis B immune tolerant phase.  Advised to do blood work that GI ordered.  She will call GI's office so they can send her the orders.  She is recommended to follow-up yearly which would be in January next year.    5. Tobacco dependence  She continues to smoke a pack per day for about 30 years now.  We discussed smoking cessation and she is  willing to try Chantix.  We discussed Chantix, mechanism of action and potential side effects.  She will target her quit date 1 week to 2 weeks from the start of the medication.  We will start with a starter pack and go up to continuing pack and she has to stay on the medication for 3 months even if she already completely quit to avoid relapse.  Patient voiced understanding.  Reevaluate in 3 months.  - varenicline (CHANTIX STARTING MONTH JODIE) 0.5 MG X 11 & 1 MG X 42 tablet; As directed.  Dispense: 56 Each; Refill: 0  - varenicline (CHANTIX) 1 MG tablet; Take 1 Tablet by mouth 2 times a day.  Dispense: 60 Tablet; Refill: 1    6. Need for immunization against influenza  Flu shot was given.  - INFLUENZA VACCINE QUAD INJ (PF)    Follow-up in 3 months.      Please note that this dictation was created using voice recognition software. I have worked with consultants from the vendor as well as technical experts from [facility] to optimize the interface. I have made every reasonable attempt to correct obvious errors, but I expect that there are errors of grammar and possibly content I did not discover before finalizing the note.

## 2021-12-07 ENCOUNTER — HOSPITAL ENCOUNTER (OUTPATIENT)
Dept: LAB | Facility: MEDICAL CENTER | Age: 46
End: 2021-12-07
Attending: STUDENT IN AN ORGANIZED HEALTH CARE EDUCATION/TRAINING PROGRAM
Payer: COMMERCIAL

## 2021-12-07 LAB
BASOPHILS # BLD AUTO: 0.6 % (ref 0–1.8)
BASOPHILS # BLD: 0.06 K/UL (ref 0–0.12)
EOSINOPHIL # BLD AUTO: 0.22 K/UL (ref 0–0.51)
EOSINOPHIL NFR BLD: 2.1 % (ref 0–6.9)
ERYTHROCYTE [DISTWIDTH] IN BLOOD BY AUTOMATED COUNT: 49 FL (ref 35.9–50)
HBV SURFACE AB SERPL IA-ACNC: <3.5 MIU/ML (ref 0–10)
HCT VFR BLD AUTO: 43.2 % (ref 37–47)
HGB BLD-MCNC: 14 G/DL (ref 12–16)
IMM GRANULOCYTES # BLD AUTO: 0.09 K/UL (ref 0–0.11)
IMM GRANULOCYTES NFR BLD AUTO: 0.9 % (ref 0–0.9)
INR PPP: 0.96 (ref 0.87–1.13)
LYMPHOCYTES # BLD AUTO: 3.38 K/UL (ref 1–4.8)
LYMPHOCYTES NFR BLD: 32.8 % (ref 22–41)
MCH RBC QN AUTO: 29.3 PG (ref 27–33)
MCHC RBC AUTO-ENTMCNC: 32.4 G/DL (ref 33.6–35)
MCV RBC AUTO: 90.4 FL (ref 81.4–97.8)
MONOCYTES # BLD AUTO: 0.86 K/UL (ref 0–0.85)
MONOCYTES NFR BLD AUTO: 8.3 % (ref 0–13.4)
NEUTROPHILS # BLD AUTO: 5.7 K/UL (ref 2–7.15)
NEUTROPHILS NFR BLD: 55.3 % (ref 44–72)
NRBC # BLD AUTO: 0 K/UL
NRBC BLD-RTO: 0 /100 WBC
PLATELET # BLD AUTO: 444 K/UL (ref 164–446)
PMV BLD AUTO: 9.2 FL (ref 9–12.9)
PROTHROMBIN TIME: 12.5 SEC (ref 12–14.6)
RBC # BLD AUTO: 4.78 M/UL (ref 4.2–5.4)
WBC # BLD AUTO: 10.3 K/UL (ref 4.8–10.8)

## 2021-12-07 PROCEDURE — 87517 HEPATITIS B DNA QUANT: CPT

## 2021-12-07 PROCEDURE — 85025 COMPLETE CBC W/AUTO DIFF WBC: CPT

## 2021-12-07 PROCEDURE — 36415 COLL VENOUS BLD VENIPUNCTURE: CPT

## 2021-12-07 PROCEDURE — 82105 ALPHA-FETOPROTEIN SERUM: CPT

## 2021-12-07 PROCEDURE — 86706 HEP B SURFACE ANTIBODY: CPT

## 2021-12-07 PROCEDURE — 85610 PROTHROMBIN TIME: CPT

## 2021-12-08 LAB — AFP-TM SERPL-MCNC: 2 NG/ML (ref 0–9)

## 2021-12-10 LAB
HBV DNA SERPL NAA+PROBE-ACNC: ABNORMAL IU/ML
HBV DNA SERPL NAA+PROBE-LOG IU: 3.74 LOG IU/ML
HBV DNA SERPL QL NAA+PROBE: DETECTED

## 2021-12-26 ENCOUNTER — OFFICE VISIT (OUTPATIENT)
Dept: URGENT CARE | Facility: PHYSICIAN GROUP | Age: 46
End: 2021-12-26
Payer: COMMERCIAL

## 2021-12-26 ENCOUNTER — HOSPITAL ENCOUNTER (OUTPATIENT)
Dept: RADIOLOGY | Facility: MEDICAL CENTER | Age: 46
End: 2021-12-26
Attending: FAMILY MEDICINE
Payer: COMMERCIAL

## 2021-12-26 VITALS
SYSTOLIC BLOOD PRESSURE: 144 MMHG | WEIGHT: 154 LBS | HEIGHT: 65 IN | BODY MASS INDEX: 25.66 KG/M2 | OXYGEN SATURATION: 100 % | RESPIRATION RATE: 16 BRPM | HEART RATE: 114 BPM | DIASTOLIC BLOOD PRESSURE: 82 MMHG | TEMPERATURE: 98.7 F

## 2021-12-26 DIAGNOSIS — S69.92XA INJURY OF LEFT HAND, INITIAL ENCOUNTER: ICD-10-CM
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
## 2021-12-26 DIAGNOSIS — S61.211A LACERATION OF LEFT INDEX FINGER WITHOUT FOREIGN BODY WITHOUT DAMAGE TO NAIL, INITIAL ENCOUNTER: ICD-10-CM

## 2021-12-26 PROCEDURE — 73130 X-RAY EXAM OF HAND: CPT | Mod: LT

## 2021-12-26 PROCEDURE — 99213 OFFICE O/P EST LOW 20 MIN: CPT | Performed by: FAMILY MEDICINE

## 2021-12-26 RX ORDER — AMOXICILLIN AND CLAVULANATE POTASSIUM 875; 125 MG/1; MG/1
1 TABLET, FILM COATED ORAL 2 TIMES DAILY
Qty: 10 TABLET | Refills: 0 | Status: SHIPPED | OUTPATIENT
Start: 2021-12-26 | End: 2021-12-31

## 2021-12-26 RX ORDER — IBUPROFEN 200 MG
400 TABLET ORAL ONCE
Status: COMPLETED | OUTPATIENT
Start: 2021-12-26 | End: 2021-12-26

## 2021-12-26 RX ADMIN — Medication 400 MG: at 10:37

## 2021-12-26 ASSESSMENT — ENCOUNTER SYMPTOMS
EYE DISCHARGE: 0
EYE REDNESS: 0
WEIGHT LOSS: 0
MYALGIAS: 0
VOMITING: 0
NAUSEA: 0

## 2021-12-26 ASSESSMENT — FIBROSIS 4 INDEX: FIB4 SCORE: 0.53

## 2021-12-26 NOTE — PROGRESS NOTES
"Subjective     Ita Johnson is a 46 y.o. female who presents with Dog Bite (both hands, bit this morning )            Onset this morning dog bite to both hands.  Large Rottweiler.  Apparently dog got off his leash and owner was present.  He notes that dog is vaccinated.  Pain in left hand and thumb are severe.  Multiple lacerations.  No active bleeding.  No function or sensory loss.  Tetanus is up-to-date.  No other aggravating or alleviating factors.      Review of Systems   Constitutional: Negative for malaise/fatigue and weight loss.   Eyes: Negative for discharge and redness.   Gastrointestinal: Negative for nausea and vomiting.   Musculoskeletal: Negative for joint pain and myalgias.   Skin: Negative for itching and rash.              Objective     /82   Pulse (!) 114   Temp 37.1 °C (98.7 °F) (Temporal)   Resp 16   Ht 1.651 m (5' 5\")   Wt 69.9 kg (154 lb)   SpO2 100%   BMI 25.63 kg/m²      Physical Exam  Constitutional:       General: She is not in acute distress.     Appearance: She is well-developed.   HENT:      Head: Normocephalic and atraumatic.   Eyes:      Conjunctiva/sclera: Conjunctivae normal.   Musculoskeletal:        Hands:    Skin:     General: Skin is warm and dry.      Findings: No rash.   Neurological:      Mental Status: She is alert and oriented to person, place, and time.                             Assessment & Plan       Xray: per radiology  1.  There is mild swelling of the proximal 2nd digit without soft tissue gas or fracture.    1. Dog bite, initial encounter  amoxicillin-clavulanate (AUGMENTIN) 875-125 MG Tab    ibuprofen (MOTRIN) tablet 400 mg   2. Injury of left hand, initial encounter  DX-HAND 3+ LEFT    ibuprofen (MOTRIN) tablet 400 mg   3. Laceration of left index finger without foreign body without damage to nail, initial encounter  amoxicillin-clavulanate (AUGMENTIN) 875-125 MG Tab    ibuprofen (MOTRIN) tablet 400 mg        Differential diagnosis, natural history, " supportive care, and indications for immediate follow-up discussed at length.      Wounds irrigated and dressed.  Wound care discussed.  Discussed risk of infection with primary closure of laceration.  Will let heal by secondary intention.

## 2022-01-24 ENCOUNTER — HOSPITAL ENCOUNTER (OUTPATIENT)
Dept: LAB | Facility: MEDICAL CENTER | Age: 47
End: 2022-01-24
Attending: FAMILY MEDICINE
Payer: COMMERCIAL

## 2022-01-24 ENCOUNTER — HOSPITAL ENCOUNTER (OUTPATIENT)
Dept: LAB | Facility: MEDICAL CENTER | Age: 47
End: 2022-01-24
Attending: STUDENT IN AN ORGANIZED HEALTH CARE EDUCATION/TRAINING PROGRAM
Payer: COMMERCIAL

## 2022-01-24 DIAGNOSIS — E78.5 DYSLIPIDEMIA: ICD-10-CM

## 2022-01-24 DIAGNOSIS — I10 ESSENTIAL HYPERTENSION: ICD-10-CM

## 2022-01-24 DIAGNOSIS — E11.9 DIABETES MELLITUS WITHOUT COMPLICATION (HCC): ICD-10-CM

## 2022-01-24 LAB
ALBUMIN SERPL BCP-MCNC: 4.7 G/DL (ref 3.2–4.9)
ALBUMIN SERPL BCP-MCNC: 4.7 G/DL (ref 3.2–4.9)
ALBUMIN/GLOB SERPL: 1.6 G/DL
ALBUMIN/GLOB SERPL: 1.6 G/DL
ALP SERPL-CCNC: 57 U/L (ref 30–99)
ALP SERPL-CCNC: 57 U/L (ref 30–99)
ALT SERPL-CCNC: 19 U/L (ref 2–50)
ALT SERPL-CCNC: 20 U/L (ref 2–50)
ANION GAP SERPL CALC-SCNC: 12 MMOL/L (ref 7–16)
ANION GAP SERPL CALC-SCNC: 13 MMOL/L (ref 7–16)
AST SERPL-CCNC: 17 U/L (ref 12–45)
AST SERPL-CCNC: 17 U/L (ref 12–45)
BASOPHILS # BLD AUTO: 0.5 % (ref 0–1.8)
BASOPHILS # BLD: 0.04 K/UL (ref 0–0.12)
BILIRUB SERPL-MCNC: 0.2 MG/DL (ref 0.1–1.5)
BILIRUB SERPL-MCNC: 0.3 MG/DL (ref 0.1–1.5)
BUN SERPL-MCNC: 16 MG/DL (ref 8–22)
BUN SERPL-MCNC: 16 MG/DL (ref 8–22)
CALCIUM SERPL-MCNC: 8.9 MG/DL (ref 8.5–10.5)
CALCIUM SERPL-MCNC: 9 MG/DL (ref 8.5–10.5)
CHLORIDE SERPL-SCNC: 101 MMOL/L (ref 96–112)
CHLORIDE SERPL-SCNC: 101 MMOL/L (ref 96–112)
CHOLEST SERPL-MCNC: 106 MG/DL (ref 100–199)
CO2 SERPL-SCNC: 23 MMOL/L (ref 20–33)
CO2 SERPL-SCNC: 23 MMOL/L (ref 20–33)
CREAT SERPL-MCNC: 0.58 MG/DL (ref 0.5–1.4)
CREAT SERPL-MCNC: 0.64 MG/DL (ref 0.5–1.4)
EOSINOPHIL # BLD AUTO: 0.24 K/UL (ref 0–0.51)
EOSINOPHIL NFR BLD: 2.8 % (ref 0–6.9)
ERYTHROCYTE [DISTWIDTH] IN BLOOD BY AUTOMATED COUNT: 45.1 FL (ref 35.9–50)
EST. AVERAGE GLUCOSE BLD GHB EST-MCNC: 154 MG/DL
FASTING STATUS PATIENT QL REPORTED: NORMAL
GLOBULIN SER CALC-MCNC: 3 G/DL (ref 1.9–3.5)
GLOBULIN SER CALC-MCNC: 3 G/DL (ref 1.9–3.5)
GLUCOSE SERPL-MCNC: 159 MG/DL (ref 65–99)
GLUCOSE SERPL-MCNC: 160 MG/DL (ref 65–99)
HBA1C MFR BLD: 7 % (ref 4–5.6)
HCT VFR BLD AUTO: 42.8 % (ref 37–47)
HDLC SERPL-MCNC: 22 MG/DL
HGB BLD-MCNC: 14.2 G/DL (ref 12–16)
IMM GRANULOCYTES # BLD AUTO: 0.04 K/UL (ref 0–0.11)
IMM GRANULOCYTES NFR BLD AUTO: 0.5 % (ref 0–0.9)
LDLC SERPL CALC-MCNC: 40 MG/DL
LYMPHOCYTES # BLD AUTO: 2.98 K/UL (ref 1–4.8)
LYMPHOCYTES NFR BLD: 34.5 % (ref 22–41)
MCH RBC QN AUTO: 28.7 PG (ref 27–33)
MCHC RBC AUTO-ENTMCNC: 33.2 G/DL (ref 33.6–35)
MCV RBC AUTO: 86.6 FL (ref 81.4–97.8)
MONOCYTES # BLD AUTO: 0.77 K/UL (ref 0–0.85)
MONOCYTES NFR BLD AUTO: 8.9 % (ref 0–13.4)
NEUTROPHILS # BLD AUTO: 4.56 K/UL (ref 2–7.15)
NEUTROPHILS NFR BLD: 52.8 % (ref 44–72)
NRBC # BLD AUTO: 0 K/UL
NRBC BLD-RTO: 0 /100 WBC
PLATELET # BLD AUTO: 448 K/UL (ref 164–446)
PMV BLD AUTO: 8.9 FL (ref 9–12.9)
POTASSIUM SERPL-SCNC: 4.1 MMOL/L (ref 3.6–5.5)
POTASSIUM SERPL-SCNC: 4.1 MMOL/L (ref 3.6–5.5)
PROT SERPL-MCNC: 7.7 G/DL (ref 6–8.2)
PROT SERPL-MCNC: 7.7 G/DL (ref 6–8.2)
RBC # BLD AUTO: 4.94 M/UL (ref 4.2–5.4)
SODIUM SERPL-SCNC: 136 MMOL/L (ref 135–145)
SODIUM SERPL-SCNC: 137 MMOL/L (ref 135–145)
TRIGL SERPL-MCNC: 219 MG/DL (ref 0–149)
WBC # BLD AUTO: 8.6 K/UL (ref 4.8–10.8)

## 2022-01-24 PROCEDURE — 87350 HEPATITIS BE AG IA: CPT

## 2022-01-24 PROCEDURE — 85025 COMPLETE CBC W/AUTO DIFF WBC: CPT

## 2022-01-24 PROCEDURE — 80053 COMPREHEN METABOLIC PANEL: CPT

## 2022-01-24 PROCEDURE — 80061 LIPID PANEL: CPT

## 2022-01-24 PROCEDURE — 36415 COLL VENOUS BLD VENIPUNCTURE: CPT

## 2022-01-24 PROCEDURE — 83036 HEMOGLOBIN GLYCOSYLATED A1C: CPT

## 2022-01-24 PROCEDURE — 80053 COMPREHEN METABOLIC PANEL: CPT | Mod: 91

## 2022-01-26 LAB — HBV E AG SERPL QL IA: NEGATIVE

## 2022-01-31 ENCOUNTER — OFFICE VISIT (OUTPATIENT)
Dept: MEDICAL GROUP | Facility: PHYSICIAN GROUP | Age: 47
End: 2022-01-31
Payer: COMMERCIAL

## 2022-01-31 VITALS
SYSTOLIC BLOOD PRESSURE: 110 MMHG | OXYGEN SATURATION: 96 % | HEIGHT: 65 IN | HEART RATE: 90 BPM | TEMPERATURE: 97.1 F | BODY MASS INDEX: 25.2 KG/M2 | WEIGHT: 151.24 LBS | DIASTOLIC BLOOD PRESSURE: 60 MMHG

## 2022-01-31 DIAGNOSIS — I10 ESSENTIAL HYPERTENSION: ICD-10-CM

## 2022-01-31 DIAGNOSIS — E11.9 DIABETES MELLITUS WITHOUT COMPLICATION (HCC): ICD-10-CM

## 2022-01-31 DIAGNOSIS — E78.5 DYSLIPIDEMIA: ICD-10-CM

## 2022-01-31 DIAGNOSIS — B18.1 CHRONIC HEPATITIS B (HCC): ICD-10-CM

## 2022-01-31 DIAGNOSIS — E55.9 VITAMIN D DEFICIENCY: ICD-10-CM

## 2022-01-31 DIAGNOSIS — F17.200 TOBACCO DEPENDENCE: ICD-10-CM

## 2022-01-31 PROCEDURE — 99214 OFFICE O/P EST MOD 30 MIN: CPT | Performed by: FAMILY MEDICINE

## 2022-01-31 ASSESSMENT — PATIENT HEALTH QUESTIONNAIRE - PHQ9: CLINICAL INTERPRETATION OF PHQ2 SCORE: 0

## 2022-01-31 ASSESSMENT — FIBROSIS 4 INDEX: FIB4 SCORE: 0.39

## 2022-02-01 NOTE — PROGRESS NOTES
Subjective     Ita Johnson is a 46 y.o. female who presents with Diabetes            HPI     Patient is here for follow-up of her medical problems.    In the interim, she sustained a dog bite a month ago and was seen at urgent care.  She had laceration of the left index finger and bite marks on both hands.  She said her own dog also sustained bites from the same dog.  Her dog was quarantined as well as the dog that bit her and her dog.  Both dogs did very well.  Both dogs were fully immunized.  Patient was given Augmentin to treat secondary infection.  She still has some soreness and swelling in the left index finger otherwise all the wounds have healed.    In terms of diabetes mellitus type 2, she continues to take metformin without side effects.    Blood pressure is well controlled on losartan/HCTZ.    For her dyslipidemia, she continues to take fenofibrate.  We added Lovaza 3 months ago because her triglycerides were already in 500s.  She said she was not able to  the prescription as her pharmacy did not notify her that it was there for her to .  She has been only taking the fenofibrate.    We started her on Chantix 3 months ago for smoking cessation.  She said she has not started the medication yet.  She is smoking the same amount a pack per day.    She continues follow-up with GI specialist for chronic hepatitis B infection.  Her most recent blood work came back negative for hepatitis B e as per HPI, the rest are negative.  Antigen which was just done this month.  She will have blood work every 6 months for surveillance.    Past medical history, past surgical history, family history reviewed-no changes    Social history reviewed-no changes    Allergies reviewed-no changes    Medications reviewed-no changes    ROS   As per HPI, the rest are negative.             Objective     /60 (BP Location: Left arm, Patient Position: Sitting, BP Cuff Size: Adult)   Pulse 90   Temp 36.2 °C (97.1 °F)  "(Temporal)   Ht 1.651 m (5' 5\")   Wt 68.6 kg (151 lb 3.8 oz)   SpO2 96%   BMI 25.17 kg/m²      Physical Exam     Examined alert, awake, oriented, not in distress    Neck-supple, no lymphadenopathy, no thyromegaly  Lungs-clear to auscultation, no rales, no wheezes  Heart-regular rate and rhythm, no murmur  Extremities-no edema, clubbing, cyanosis, well-healed linear laceration left index finger and bite marks both hands in the right first MCP joint area and left first CMC joint area.  There is mild swelling and erythema of the left index finger along the healed laceration but this is not consistent with infection.           Hospital Outpatient Visit on 01/24/2022   Component Date Value Ref Range Status   • WBC 01/24/2022 8.6  4.8 - 10.8 K/uL Final   • RBC 01/24/2022 4.94  4.20 - 5.40 M/uL Final   • Hemoglobin 01/24/2022 14.2  12.0 - 16.0 g/dL Final   • Hematocrit 01/24/2022 42.8  37.0 - 47.0 % Final   • MCV 01/24/2022 86.6  81.4 - 97.8 fL Final   • MCH 01/24/2022 28.7  27.0 - 33.0 pg Final   • MCHC 01/24/2022 33.2* 33.6 - 35.0 g/dL Final   • RDW 01/24/2022 45.1  35.9 - 50.0 fL Final   • Platelet Count 01/24/2022 448* 164 - 446 K/uL Final   • MPV 01/24/2022 8.9* 9.0 - 12.9 fL Final   • Neutrophils-Polys 01/24/2022 52.80  44.00 - 72.00 % Final   • Lymphocytes 01/24/2022 34.50  22.00 - 41.00 % Final   • Monocytes 01/24/2022 8.90  0.00 - 13.40 % Final   • Eosinophils 01/24/2022 2.80  0.00 - 6.90 % Final   • Basophils 01/24/2022 0.50  0.00 - 1.80 % Final   • Immature Granulocytes 01/24/2022 0.50  0.00 - 0.90 % Final   • Nucleated RBC 01/24/2022 0.00  /100 WBC Final   • Neutrophils (Absolute) 01/24/2022 4.56  2.00 - 7.15 K/uL Final    Includes immature neutrophils, if present.   • Lymphs (Absolute) 01/24/2022 2.98  1.00 - 4.80 K/uL Final   • Monos (Absolute) 01/24/2022 0.77  0.00 - 0.85 K/uL Final   • Eos (Absolute) 01/24/2022 0.24  0.00 - 0.51 K/uL Final   • Baso (Absolute) 01/24/2022 0.04  0.00 - 0.12 K/uL Final   • " Immature Granulocytes (abs) 01/24/2022 0.04  0.00 - 0.11 K/uL Final   • NRBC (Absolute) 01/24/2022 0.00  K/uL Final   • Glycohemoglobin 01/24/2022 7.0* 4.0 - 5.6 % Final    Comment: Increased risk for diabetes:  5.7 -6.4%  Diabetes:  >6.4%  Glycemic control for adults with diabetes:  <7.0%    The above interpretations are per ADA guidelines.  Diagnosis  of diabetes mellitus on the basis of elevated Hemoglobin A1c  should be confirmed by repeating the Hb A1c test.     • Est Avg Glucose 01/24/2022 154  mg/dL Final    Comment: The eAG calculation is based on the A1c-Derived Daily Glucose  (ADAG) study.  See the ADA's website for additional information.     • Sodium 01/24/2022 136  135 - 145 mmol/L Final   • Potassium 01/24/2022 4.1  3.6 - 5.5 mmol/L Final   • Chloride 01/24/2022 101  96 - 112 mmol/L Final   • Co2 01/24/2022 23  20 - 33 mmol/L Final   • Anion Gap 01/24/2022 12.0  7.0 - 16.0 Final   • Glucose 01/24/2022 160* 65 - 99 mg/dL Final   • Bun 01/24/2022 16  8 - 22 mg/dL Final   • Creatinine 01/24/2022 0.58  0.50 - 1.40 mg/dL Final   • Calcium 01/24/2022 9.0  8.5 - 10.5 mg/dL Final   • AST(SGOT) 01/24/2022 17  12 - 45 U/L Final   • ALT(SGPT) 01/24/2022 19  2 - 50 U/L Final   • Alkaline Phosphatase 01/24/2022 57  30 - 99 U/L Final   • Total Bilirubin 01/24/2022 0.3  0.1 - 1.5 mg/dL Final   • Albumin 01/24/2022 4.7  3.2 - 4.9 g/dL Final   • Total Protein 01/24/2022 7.7  6.0 - 8.2 g/dL Final   • Globulin 01/24/2022 3.0  1.9 - 3.5 g/dL Final   • A-G Ratio 01/24/2022 1.6  g/dL Final   • Cholesterol,Tot 01/24/2022 106  100 - 199 mg/dL Final   • Triglycerides 01/24/2022 219* 0 - 149 mg/dL Final   • HDL 01/24/2022 22* >=40 mg/dL Final   • LDL 01/24/2022 40  <100 mg/dL Final   • Fasting Status 01/24/2022 Fasting   Final   • GFR If  01/24/2022 >60  >60 mL/min/1.73 m 2 Final   • GFR If Non  01/24/2022 >60  >60 mL/min/1.73 m 2 Final                  Assessment & Plan        1. Diabetes  mellitus without complication (HCC)  Hemoglobin A1c increased to 7.0.  This is not out of control but this needs to be better controlled.  For now we will keep her on the same dose of Metformin.  Advised to work harder on diet, exercise and weight loss.  If this goes up above 7.0 then we will increase the dose of Metformin.  Recheck blood work next visit.  - Lipid Profile; Future  - Comp Metabolic Panel; Future  - HEMOGLOBIN A1C; Future  - CBC WITH DIFFERENTIAL; Future  - VITAMIN D,25 HYDROXY; Future    2. Dyslipidemia  The triglycerides improved on fenofibrate only.  The goal is still below 150.  Advised to avoid sweets and decrease the carbs, exercise regularly and lose weight.  No need to add Lovaza.  - Lipid Profile; Future  - Comp Metabolic Panel; Future  - HEMOGLOBIN A1C; Future  - CBC WITH DIFFERENTIAL; Future  - VITAMIN D,25 HYDROXY; Future    3. Essential hypertension  Controlled on losartan/HCTZ.  - Lipid Profile; Future  - Comp Metabolic Panel; Future  - HEMOGLOBIN A1C; Future  - CBC WITH DIFFERENTIAL; Future  - VITAMIN D,25 HYDROXY; Future    4. Vitamin D deficiency  We will do updated vitamin D level next visit.  Continue vitamin D supplementation.  - Lipid Profile; Future  - Comp Metabolic Panel; Future  - HEMOGLOBIN A1C; Future  - CBC WITH DIFFERENTIAL; Future  - VITAMIN D,25 HYDROXY; Future    5. Chronic hepatitis B (HCC)  Hepatitis B E antigen negative.  She will continue follow-up with GI specialist.      6. Tobacco dependence  Advised to start Chantix and target the date when she wants to quit 1 to 2 weeks after she starts the medication.            I will see her for follow-up in 3 months.      Please note that this dictation was created using voice recognition software. I have worked with consultants from the vendor as well as technical experts from [employer] to optimize the interface. I have made every reasonable attempt to correct obvious errors, but I expect that there are errors of  grammar and possibly content I did not discover before finalizing the note.

## 2022-03-08 DIAGNOSIS — E11.9 DIABETES MELLITUS WITHOUT COMPLICATION (HCC): ICD-10-CM

## 2022-03-08 DIAGNOSIS — I10 ESSENTIAL HYPERTENSION: ICD-10-CM

## 2022-03-08 RX ORDER — LOSARTAN POTASSIUM AND HYDROCHLOROTHIAZIDE 12.5; 1 MG/1; MG/1
TABLET ORAL
Qty: 90 TABLET | Refills: 1 | Status: SHIPPED | OUTPATIENT
Start: 2022-03-08 | End: 2022-08-17

## 2022-03-08 RX ORDER — METFORMIN HYDROCHLORIDE 500 MG/1
TABLET, EXTENDED RELEASE ORAL
Qty: 180 TABLET | Refills: 1 | Status: SHIPPED | OUTPATIENT
Start: 2022-03-08 | End: 2022-08-17

## 2022-04-27 ENCOUNTER — OFFICE VISIT (OUTPATIENT)
Dept: URGENT CARE | Facility: PHYSICIAN GROUP | Age: 47
End: 2022-04-27
Payer: COMMERCIAL

## 2022-04-27 ENCOUNTER — HOSPITAL ENCOUNTER (OUTPATIENT)
Facility: MEDICAL CENTER | Age: 47
End: 2022-04-27
Attending: PHYSICIAN ASSISTANT
Payer: COMMERCIAL

## 2022-04-27 VITALS
WEIGHT: 152 LBS | RESPIRATION RATE: 16 BRPM | BODY MASS INDEX: 25.33 KG/M2 | HEIGHT: 65 IN | HEART RATE: 82 BPM | SYSTOLIC BLOOD PRESSURE: 124 MMHG | OXYGEN SATURATION: 99 % | TEMPERATURE: 97.7 F | DIASTOLIC BLOOD PRESSURE: 76 MMHG

## 2022-04-27 DIAGNOSIS — R05.9 COUGH: ICD-10-CM

## 2022-04-27 DIAGNOSIS — J06.9 UPPER RESPIRATORY TRACT INFECTION, UNSPECIFIED TYPE: ICD-10-CM

## 2022-04-27 DIAGNOSIS — R51.9 NONINTRACTABLE HEADACHE, UNSPECIFIED CHRONICITY PATTERN, UNSPECIFIED HEADACHE TYPE: ICD-10-CM

## 2022-04-27 LAB
EXTERNAL QUALITY CONTROL: NORMAL
FLUAV+FLUBV AG SPEC QL IA: NORMAL
INT CON NEG: NORMAL
INT CON POS: NORMAL
SARS-COV+SARS-COV-2 AG RESP QL IA.RAPID: NEGATIVE

## 2022-04-27 PROCEDURE — 99213 OFFICE O/P EST LOW 20 MIN: CPT | Performed by: PHYSICIAN ASSISTANT

## 2022-04-27 PROCEDURE — U0003 INFECTIOUS AGENT DETECTION BY NUCLEIC ACID (DNA OR RNA); SEVERE ACUTE RESPIRATORY SYNDROME CORONAVIRUS 2 (SARS-COV-2) (CORONAVIRUS DISEASE [COVID-19]), AMPLIFIED PROBE TECHNIQUE, MAKING USE OF HIGH THROUGHPUT TECHNOLOGIES AS DESCRIBED BY CMS-2020-01-R: HCPCS

## 2022-04-27 PROCEDURE — 87426 SARSCOV CORONAVIRUS AG IA: CPT | Performed by: PHYSICIAN ASSISTANT

## 2022-04-27 PROCEDURE — U0005 INFEC AGEN DETEC AMPLI PROBE: HCPCS

## 2022-04-27 PROCEDURE — 87804 INFLUENZA ASSAY W/OPTIC: CPT | Performed by: PHYSICIAN ASSISTANT

## 2022-04-27 ASSESSMENT — FIBROSIS 4 INDEX: FIB4 SCORE: 0.39

## 2022-04-27 NOTE — LETTER
April 27, 2022    To Whom It May Concern:         This is confirmation that Ita Johnson attended her scheduled appointment with Rosa Bustos P.A.-C. on 4/27/22. Please excuse patient from work 4/27 and 4/28.          If you have any questions please do not hesitate to call me at the phone number listed below.    Sincerely,          Rosa Bustos P.A.-C.  [phone]

## 2022-04-27 NOTE — PROGRESS NOTES
"Subjective:   Ita Johnson is a 46 y.o. female who presents for Chills (Not feel well since yesterday. )     Chills, cold, HA started this morning.  Noticing some vocal changes.  Cough intermittent.  Myalgias. Diarrhea. No fever.  Vaccinated. No SOB.      Medications:  fenofibrate Tabs  losartan-hydrochlorothiazide  metFORMIN ER Tb24    Allergies:             Patient has no known allergies.    Surgical History:       No past surgical history on file.    Past Social Hx:  Ita Johnson  reports that she has been smoking cigarettes. She has a 30.00 pack-year smoking history. She has never used smokeless tobacco. She reports current alcohol use. She reports that she does not use drugs.     Past Family Hx:   Ita Johnson family history includes Arthritis in her sister; Diabetes in her brother, father, and sister; Heart Failure in her father; Hyperlipidemia in her father and sister; Hypertension in her brother, father, mother, and sister; Prostate cancer in her father; Stroke in her mother.       Problem list, medications, and allergies reviewed by myself today in Epic.     Objective:     /76   Pulse 82   Temp 36.5 °C (97.7 °F)   Resp 16   Ht 1.651 m (5' 5\")   Wt 68.9 kg (152 lb)   SpO2 99%   BMI 25.29 kg/m²     Physical Exam  Vitals and nursing note reviewed.   Constitutional:       General: She is not in acute distress.     Appearance: Normal appearance.   HENT:      Head: Normocephalic.      Jaw: No trismus.      Right Ear: Tympanic membrane is not erythematous. Tympanic membrane has decreased mobility.      Left Ear: Tympanic membrane is not erythematous. Tympanic membrane has decreased mobility.      Nose: Congestion and rhinorrhea present.      Mouth/Throat:      Mouth: Mucous membranes are moist.      Palate: No lesions.      Pharynx: Uvula midline. Posterior oropharyngeal erythema present. No oropharyngeal exudate.      Tonsils: No tonsillar exudate.   Eyes:      Extraocular Movements: Extraocular movements " intact.   Cardiovascular:      Rate and Rhythm: Normal rate and regular rhythm.      Pulses: Normal pulses.      Heart sounds: Normal heart sounds.   Pulmonary:      Effort: Pulmonary effort is normal. No accessory muscle usage or respiratory distress.      Breath sounds: Normal breath sounds. No stridor or decreased air movement. No wheezing.   Abdominal:      Tenderness: There is no abdominal tenderness.   Musculoskeletal:      Cervical back: Normal range of motion.   Lymphadenopathy:      Cervical: No cervical adenopathy.   Skin:     Findings: No rash.   Neurological:      Mental Status: She is alert.   Psychiatric:         Behavior: Behavior is cooperative.       Rapid flu and COVID both negative  Assessment/Plan:     Diagnosis and Associated Orders:     1. Cough    2. Nonintractable headache, unspecified chronicity pattern, unspecified headache type    3. Upper respiratory tract infection, unspecified type  - POCT Influenza A/B  - POCT SARS-COV Antigen [facility] Manual Result  - SARS-CoV-2 PCR (24 hour In-House): Collect NP swab in VTM; Future        Comments/MDM:    Rapid flu and COVID-19.  Both negative.  COVID PCR obtained.    The patient's presenting symptoms and exam findings most likely are due to a viral etiology.     Symptomatic and supportive care:   Plenty of oral hydration and rest   Over the counter cough suppressant as directed.  Tylenol or ibuprofen for pain and fever as directed.   Warm salt water gargles for sore throat, soft foods, cool liquids.   Saline nasal spray, Flonase, and/or otc sudafed (if no history of hypertension) as a decongestant.   Infection control measures at home. Stay away from people, Hand washing, covering sneeze/cough, disinfect surfaces.   Remain home from work, school, and other populated environments while ill.  Overall, the patient is well-appearing. They are not hypoxic, afebrile, and a normal pulmonary exam.      I personally reviewed prior external notes and test results  pertinent to today's visit.  Red flags discussed as well as indications to present to the Emergency Department.  Supportive care, natural history, differential diagnoses, and indications for immediate follow-up discussed.  Patient expresses understanding and agrees to plan.  Patient denies any other questions or concerns.    Follow-up with the primary care physician for recheck, reevaluation, and consideration of further management.      Please note that this dictation was created using voice recognition software. I have made a reasonable attempt to correct obvious errors, but I expect that there are errors of grammar and possibly content that I did not discover before finalizing the note.    This note was electronically signed by Rosa Bustos PA-C

## 2022-04-28 DIAGNOSIS — J06.9 UPPER RESPIRATORY TRACT INFECTION, UNSPECIFIED TYPE: ICD-10-CM

## 2022-04-28 LAB
COVID ORDER STATUS COVID19: NORMAL
SARS-COV-2 RNA RESP QL NAA+PROBE: NOTDETECTED
SPECIMEN SOURCE: NORMAL

## 2022-05-16 ENCOUNTER — HOSPITAL ENCOUNTER (OUTPATIENT)
Dept: LAB | Facility: MEDICAL CENTER | Age: 47
End: 2022-05-16
Attending: FAMILY MEDICINE
Payer: COMMERCIAL

## 2022-05-16 ENCOUNTER — HOSPITAL ENCOUNTER (OUTPATIENT)
Dept: LAB | Facility: MEDICAL CENTER | Age: 47
End: 2022-05-16
Attending: STUDENT IN AN ORGANIZED HEALTH CARE EDUCATION/TRAINING PROGRAM
Payer: COMMERCIAL

## 2022-05-16 DIAGNOSIS — E78.5 DYSLIPIDEMIA: ICD-10-CM

## 2022-05-16 DIAGNOSIS — I10 ESSENTIAL HYPERTENSION: ICD-10-CM

## 2022-05-16 DIAGNOSIS — E55.9 VITAMIN D DEFICIENCY: ICD-10-CM

## 2022-05-16 DIAGNOSIS — E11.9 DIABETES MELLITUS WITHOUT COMPLICATION (HCC): ICD-10-CM

## 2022-05-16 LAB
ALBUMIN SERPL BCP-MCNC: 4.4 G/DL (ref 3.2–4.9)
ALBUMIN SERPL BCP-MCNC: 4.5 G/DL (ref 3.2–4.9)
ALBUMIN/GLOB SERPL: 1.3 G/DL
ALP SERPL-CCNC: 59 U/L (ref 30–99)
ALP SERPL-CCNC: 59 U/L (ref 30–99)
ALT SERPL-CCNC: 22 U/L (ref 2–50)
ALT SERPL-CCNC: 23 U/L (ref 2–50)
ANION GAP SERPL CALC-SCNC: 12 MMOL/L (ref 7–16)
AST SERPL-CCNC: 16 U/L (ref 12–45)
AST SERPL-CCNC: 19 U/L (ref 12–45)
BASOPHILS # BLD AUTO: 0.7 % (ref 0–1.8)
BASOPHILS # BLD: 0.05 K/UL (ref 0–0.12)
BILIRUB CONJ SERPL-MCNC: <0.2 MG/DL (ref 0.1–0.5)
BILIRUB INDIRECT SERPL-MCNC: NORMAL MG/DL (ref 0–1)
BILIRUB SERPL-MCNC: 0.3 MG/DL (ref 0.1–1.5)
BILIRUB SERPL-MCNC: 0.3 MG/DL (ref 0.1–1.5)
BUN SERPL-MCNC: 20 MG/DL (ref 8–22)
CALCIUM SERPL-MCNC: 9 MG/DL (ref 8.5–10.5)
CHLORIDE SERPL-SCNC: 101 MMOL/L (ref 96–112)
CHOLEST SERPL-MCNC: 146 MG/DL (ref 100–199)
CO2 SERPL-SCNC: 23 MMOL/L (ref 20–33)
CREAT SERPL-MCNC: 0.63 MG/DL (ref 0.5–1.4)
EOSINOPHIL # BLD AUTO: 0.3 K/UL (ref 0–0.51)
EOSINOPHIL NFR BLD: 4.3 % (ref 0–6.9)
ERYTHROCYTE [DISTWIDTH] IN BLOOD BY AUTOMATED COUNT: 46.1 FL (ref 35.9–50)
EST. AVERAGE GLUCOSE BLD GHB EST-MCNC: 143 MG/DL
FASTING STATUS PATIENT QL REPORTED: NORMAL
GFR SERPLBLD CREATININE-BSD FMLA CKD-EPI: 111 ML/MIN/1.73 M 2
GLOBULIN SER CALC-MCNC: 3.4 G/DL (ref 1.9–3.5)
GLUCOSE SERPL-MCNC: 148 MG/DL (ref 65–99)
HBA1C MFR BLD: 6.6 % (ref 4–5.6)
HCT VFR BLD AUTO: 43.3 % (ref 37–47)
HDLC SERPL-MCNC: 22 MG/DL
HGB BLD-MCNC: 14.3 G/DL (ref 12–16)
IMM GRANULOCYTES # BLD AUTO: 0.02 K/UL (ref 0–0.11)
IMM GRANULOCYTES NFR BLD AUTO: 0.3 % (ref 0–0.9)
LDLC SERPL CALC-MCNC: 45 MG/DL
LYMPHOCYTES # BLD AUTO: 2.97 K/UL (ref 1–4.8)
LYMPHOCYTES NFR BLD: 42.6 % (ref 22–41)
MCH RBC QN AUTO: 28.1 PG (ref 27–33)
MCHC RBC AUTO-ENTMCNC: 33 G/DL (ref 33.6–35)
MCV RBC AUTO: 85.1 FL (ref 81.4–97.8)
MONOCYTES # BLD AUTO: 0.56 K/UL (ref 0–0.85)
MONOCYTES NFR BLD AUTO: 8 % (ref 0–13.4)
NEUTROPHILS # BLD AUTO: 3.07 K/UL (ref 2–7.15)
NEUTROPHILS NFR BLD: 44.1 % (ref 44–72)
NRBC # BLD AUTO: 0 K/UL
NRBC BLD-RTO: 0 /100 WBC
PLATELET # BLD AUTO: 482 K/UL (ref 164–446)
PMV BLD AUTO: 9.1 FL (ref 9–12.9)
POTASSIUM SERPL-SCNC: 3.7 MMOL/L (ref 3.6–5.5)
PROT SERPL-MCNC: 7.8 G/DL (ref 6–8.2)
PROT SERPL-MCNC: 7.8 G/DL (ref 6–8.2)
RBC # BLD AUTO: 5.09 M/UL (ref 4.2–5.4)
SODIUM SERPL-SCNC: 136 MMOL/L (ref 135–145)
TRIGL SERPL-MCNC: 395 MG/DL (ref 0–149)
WBC # BLD AUTO: 7 K/UL (ref 4.8–10.8)

## 2022-05-16 PROCEDURE — 80053 COMPREHEN METABOLIC PANEL: CPT

## 2022-05-16 PROCEDURE — 80061 LIPID PANEL: CPT

## 2022-05-16 PROCEDURE — 86707 HEPATITIS BE ANTIBODY: CPT

## 2022-05-16 PROCEDURE — 82306 VITAMIN D 25 HYDROXY: CPT

## 2022-05-16 PROCEDURE — 85025 COMPLETE CBC W/AUTO DIFF WBC: CPT

## 2022-05-16 PROCEDURE — 36415 COLL VENOUS BLD VENIPUNCTURE: CPT

## 2022-05-16 PROCEDURE — 83036 HEMOGLOBIN GLYCOSYLATED A1C: CPT

## 2022-05-16 PROCEDURE — 80076 HEPATIC FUNCTION PANEL: CPT

## 2022-05-18 LAB — HBV E AB SERPL QL IA: POSITIVE

## 2022-05-20 LAB — 25(OH)D3 SERPL-MCNC: 24 NG/ML (ref 30–80)

## 2022-05-22 DIAGNOSIS — E78.5 DYSLIPIDEMIA: ICD-10-CM

## 2022-05-22 RX ORDER — FENOFIBRATE 145 MG/1
TABLET, COATED ORAL
Qty: 90 TABLET | Refills: 1 | Status: SHIPPED | OUTPATIENT
Start: 2022-05-22 | End: 2022-11-16

## 2022-05-23 ENCOUNTER — OFFICE VISIT (OUTPATIENT)
Dept: MEDICAL GROUP | Facility: PHYSICIAN GROUP | Age: 47
End: 2022-05-23
Payer: COMMERCIAL

## 2022-05-23 VITALS
TEMPERATURE: 97.9 F | HEART RATE: 91 BPM | WEIGHT: 150.13 LBS | OXYGEN SATURATION: 97 % | SYSTOLIC BLOOD PRESSURE: 106 MMHG | DIASTOLIC BLOOD PRESSURE: 80 MMHG | BODY MASS INDEX: 25.01 KG/M2 | HEIGHT: 65 IN

## 2022-05-23 DIAGNOSIS — E78.5 DYSLIPIDEMIA: ICD-10-CM

## 2022-05-23 DIAGNOSIS — E11.9 DIABETES MELLITUS WITHOUT COMPLICATION (HCC): ICD-10-CM

## 2022-05-23 DIAGNOSIS — I10 ESSENTIAL HYPERTENSION: ICD-10-CM

## 2022-05-23 DIAGNOSIS — B18.1 CHRONIC HEPATITIS B (HCC): ICD-10-CM

## 2022-05-23 DIAGNOSIS — E55.9 VITAMIN D DEFICIENCY: ICD-10-CM

## 2022-05-23 DIAGNOSIS — R79.89 ELEVATED PLATELET COUNT: ICD-10-CM

## 2022-05-23 PROCEDURE — 99214 OFFICE O/P EST MOD 30 MIN: CPT | Performed by: FAMILY MEDICINE

## 2022-05-23 ASSESSMENT — FIBROSIS 4 INDEX: FIB4 SCORE: 0.31

## 2022-05-24 NOTE — PROGRESS NOTES
"Subjective     Ita Johnson is a 46 y.o. female who presents with Diabetes            HPI     Patient is here for follow-up of her medical problems.    In terms of diabetes mellitus type 2, patient continues to take metformin 1000 mg twice a day without side effects.    For the dyslipidemia, she continues to take fenofibrate without side effects.    For her hypertension, she continues to take losartan/HCTZ with good control of the blood pressure without side effects.    She continues take vitamin D supplementation for vitamin D deficiency.    She is followed by the gastroenterologist for chronic hepatitis B infection.  Denies any symptoms.  Her most recent blood work by the GI specialist came back positive hepatitis Be antibody.  Prior hepatitis Be antigen came back negative.  GI specialist ordered hepatitis B virus PCR quantitative test which she still needs to do.    Past medical history, past surgical history, family history reviewed-no changes    Social history reviewed-no changes    Allergies reviewed-no changes    Medications reviewed-no changes    ROS     As per HPI, the rest are negative.           Objective     /80 (BP Location: Left arm, Patient Position: Sitting, BP Cuff Size: Adult)   Pulse 91   Temp 36.6 °C (97.9 °F) (Temporal)   Ht 1.651 m (5' 5\")   Wt 68.1 kg (150 lb 2.1 oz)   SpO2 97%   BMI 24.98 kg/m²      Physical Exam     Examined alert, awake, oriented, not in distress    Neck-supple, no lymphadenopathy, no thyromegaly  Lungs-clear to auscultation, no rales, no wheezes  Heart-regular rate and rhythm, no murmur  Extremities-no edema, clubbing, cyanosis          Hospital Outpatient Visit on 05/16/2022   Component Date Value Ref Range Status   • Alkaline Phosphatase 05/16/2022 59  30 - 99 U/L Final   • AST(SGOT) 05/16/2022 16  12 - 45 U/L Final   • ALT(SGPT) 05/16/2022 23  2 - 50 U/L Final   • Total Bilirubin 05/16/2022 0.3  0.1 - 1.5 mg/dL Final   • Direct Bilirubin 05/16/2022 <0.2  0.1 - " 0.5 mg/dL Final   • Indirect Bilirubin 05/16/2022 see below  0.0 - 1.0 mg/dL Final    Comment: Unable to calculate indirect bilirubin due to a total or direct  bilirubin result being outside the measurement range of the analyzer.     • Albumin 05/16/2022 4.5  3.2 - 4.9 g/dL Final   • Total Protein 05/16/2022 7.8  6.0 - 8.2 g/dL Final   • Hepatitis BE Antibody 05/16/2022 Positive (A) Negative Final    Comment: The anti-HBe is reactive, which is consistent with recent or  remote hepatitis B infection. Anti-HBe can be present in a small  proportion of chronic HBV infections, but its presence usually  indicates resolution. If HBeAg is also positive, this may  represent the transition between the appearance of anti-HBe and  decline of HBeAg and retesting in a month may be useful.  Performed by [email or web address],  [address] [place] [id number] [phone]  [email or web address], Sahara Mullen MD - Lab. Director           Latest Reference Range & Units 05/16/22 06:45   WBC 4.8 - 10.8 K/uL 7.0   RBC 4.20 - 5.40 M/uL 5.09   Hemoglobin 12.0 - 16.0 g/dL 14.3   Hematocrit 37.0 - 47.0 % 43.3   MCV 81.4 - 97.8 fL 85.1   MCH 27.0 - 33.0 pg 28.1   MCHC 33.6 - 35.0 g/dL 33.0 (L)   RDW 35.9 - 50.0 fL 46.1   Platelet Count 164 - 446 K/uL 482 (H)   MPV 9.0 - 12.9 fL 9.1   Neutrophils-Polys 44.00 - 72.00 % 44.10   Neutrophils (Absolute) 2.00 - 7.15 K/uL 3.07 [1]   Lymphocytes 22.00 - 41.00 % 42.60 (H)   Lymphs (Absolute) 1.00 - 4.80 K/uL 2.97   Monocytes 0.00 - 13.40 % 8.00   Monos (Absolute) 0.00 - 0.85 K/uL 0.56   Eosinophils 0.00 - 6.90 % 4.30   Eos (Absolute) 0.00 - 0.51 K/uL 0.30   Basophils 0.00 - 1.80 % 0.70   Baso (Absolute) 0.00 - 0.12 K/uL 0.05   Immature Granulocytes 0.00 - 0.90 % 0.30   Immature Granulocytes (abs) 0.00 - 0.11 K/uL 0.02   Nucleated RBC /100 WBC 0.00   NRBC (Absolute) K/uL 0.00   Sodium 135 - 145 mmol/L 136   Potassium 3.6 - 5.5 mmol/L 3.7   Chloride 96 - 112 mmol/L 101   Co2 20 - 33 mmol/L 23   Anion Gap 7.0 -  16.0  12.0   Glucose 65 - 99 mg/dL 148 (H)   Bun 8 - 22 mg/dL 20   Creatinine 0.50 - 1.40 mg/dL 0.63   GFR (CKD-EPI) >60 mL/min/1.73 m 2 111 [2]   Calcium 8.5 - 10.5 mg/dL 9.0   AST(SGOT) 12 - 45 U/L 19   ALT(SGPT) 2 - 50 U/L 22   Alkaline Phosphatase 30 - 99 U/L 59   Total Bilirubin 0.1 - 1.5 mg/dL 0.3   Albumin 3.2 - 4.9 g/dL 4.4   Total Protein 6.0 - 8.2 g/dL 7.8   Globulin 1.9 - 3.5 g/dL 3.4   A-G Ratio g/dL 1.3   Glycohemoglobin 4.0 - 5.6 % 6.6 (H) [3]   Estim. Avg Glu mg/dL 143 [4]   Fasting Status  Fasting   Cholesterol,Tot 100 - 199 mg/dL 146   Triglycerides 0 - 149 mg/dL 395 (H)   HDL >=40 mg/dL 22 !   LDL <100 mg/dL 45   25-Hydroxy   Vitamin D 25 30 - 80 ng/mL 24 (L) [5]   (L): Data is abnormally low  (H): Data is abnormally high  !: Data is abnormal  [1] Includes immature neutrophils, if present.  [2] Effective 3/15/2022, estimated Glomerular Filtration Rate   is calculated using race neutral CKD-EPI 2021 equation   per NKF-ASN recommendations.     [3] Increased risk for diabetes:  5.7 -6.4%   Diabetes:  >6.4%   Glycemic control for adults with diabetes:  <7.0%      The above interpretations are per ADA guidelines.  Diagnosis   of diabetes mellitus on the basis of elevated Hemoglobin A1c   should be confirmed by repeating the Hb A1c test.     [4] The eAG calculation is based on the A1c-Derived Daily Glucose   (ADAG) study.  See the ADA's website for additional information.     [5] INTERPRETIVE INFORMATION: Vitamin D, 25-Hydroxy   This assay accurately quantifies the sum of vitamin D3, 25-hydroxy   and vitamin D2, 25-hydroxy.   0-17 years:   Deficiency: less than 20 ng/mL   Optimum level: greater than or equal to 20 ng/mL*   *(Edgar CL et al. Pediatrics 2008; 122: 1142-52.)   18 years and older:   Deficiency: Less than 20 ng/mL   Insufficiency: 20-29 ng/mL   Optimum Level: 30-80 ng/mL   Possible Toxicity: Greater than 150 ng/mL   Performed By: [facility]   [address] [place] [id number]    : Sahara Mullen MD            Assessment & Plan        1. Diabetes mellitus without complication (HCC)  This is now controlled with hemoglobin A1c down to 6.6.  Continue current dose of metformin.  - Lipid Profile; Future  - Comp Metabolic Panel; Future  - HEMOGLOBIN A1C; Future  - VITAMIN D,25 HYDROXY; Future  - CBC WITH DIFFERENTIAL; Future    2. Dyslipidemia  Triglycerides are higher than before.  Advised to watch the carbs and sweets more closely.  HDL is the same at 22.  Advised to increase activity with regular exercises.  LDL is at goal which is below 70.  Continue fenofibrate.  - Lipid Profile; Future  - Comp Metabolic Panel; Future  - HEMOGLOBIN A1C; Future  - VITAMIN D,25 HYDROXY; Future  - CBC WITH DIFFERENTIAL; Future    3. Essential hypertension  Controlled on her blood pressure medication.  Continue the same medication.  - Lipid Profile; Future  - Comp Metabolic Panel; Future  - HEMOGLOBIN A1C; Future  - VITAMIN D,25 HYDROXY; Future  - CBC WITH DIFFERENTIAL; Future    4. Vitamin D deficiency  This is now slightly low.  Advised to add another 1000 international units to her current dose of vitamin D supplementation.  - Lipid Profile; Future  - Comp Metabolic Panel; Future  - HEMOGLOBIN A1C; Future  - VITAMIN D,25 HYDROXY; Future  - CBC WITH DIFFERENTIAL; Future    5. Chronic hepatitis B (HCC)  I informed the patient that she does not have active infection and that she now has hepatitis B antibodies which means that she is now developing immunity.  She will do the hepatitis B virus PCR quantitative test ordered by GI specialist.  We will follow along.    6. Elevated platelet count  Mild elevation of platelet count on 2 occasions in January and the most recent blood work.  We will keep an eye on this since this is just mild elevation and we will recheck CBC next visit.  - Lipid Profile; Future  - Comp Metabolic Panel; Future  - HEMOGLOBIN A1C; Future  - VITAMIN D,25 HYDROXY;  Future  - CBC WITH DIFFERENTIAL; Future    I will see her for follow-up in 4 months.      Please note that this dictation was created using voice recognition software. I have worked with consultants from the vendor as well as technical experts from [facility] to optimize the interface. I have made every reasonable attempt to correct obvious errors, but I expect that there are errors of grammar and possibly content I did not discover before finalizing the note.

## 2022-06-23 ENCOUNTER — HOSPITAL ENCOUNTER (OUTPATIENT)
Dept: LAB | Facility: MEDICAL CENTER | Age: 47
End: 2022-06-23
Attending: STUDENT IN AN ORGANIZED HEALTH CARE EDUCATION/TRAINING PROGRAM
Payer: COMMERCIAL

## 2022-06-23 ENCOUNTER — HOSPITAL ENCOUNTER (OUTPATIENT)
Facility: MEDICAL CENTER | Age: 47
End: 2022-06-23
Attending: NURSE PRACTITIONER
Payer: COMMERCIAL

## 2022-06-23 ENCOUNTER — OFFICE VISIT (OUTPATIENT)
Dept: URGENT CARE | Facility: PHYSICIAN GROUP | Age: 47
End: 2022-06-23
Payer: COMMERCIAL

## 2022-06-23 VITALS
BODY MASS INDEX: 24.89 KG/M2 | DIASTOLIC BLOOD PRESSURE: 76 MMHG | RESPIRATION RATE: 16 BRPM | WEIGHT: 149.4 LBS | TEMPERATURE: 99.9 F | HEIGHT: 65 IN | HEART RATE: 98 BPM | OXYGEN SATURATION: 98 % | SYSTOLIC BLOOD PRESSURE: 122 MMHG

## 2022-06-23 DIAGNOSIS — J34.89 RHINORRHEA: ICD-10-CM

## 2022-06-23 DIAGNOSIS — B34.9 NONSPECIFIC SYNDROME SUGGESTIVE OF VIRAL ILLNESS: ICD-10-CM

## 2022-06-23 DIAGNOSIS — R05.9 COUGH: ICD-10-CM

## 2022-06-23 DIAGNOSIS — R50.9 LOW GRADE FEVER: ICD-10-CM

## 2022-06-23 DIAGNOSIS — R06.89 DECREASED LUNG SOUNDS: ICD-10-CM

## 2022-06-23 DIAGNOSIS — R53.83 OTHER FATIGUE: ICD-10-CM

## 2022-06-23 LAB
EXTERNAL QUALITY CONTROL: NORMAL
FLUAV+FLUBV AG SPEC QL IA: NEGATIVE
INT CON NEG: NORMAL
INT CON POS: NORMAL
SARS-COV+SARS-COV-2 AG RESP QL IA.RAPID: NEGATIVE

## 2022-06-23 PROCEDURE — 87426 SARSCOV CORONAVIRUS AG IA: CPT | Performed by: NURSE PRACTITIONER

## 2022-06-23 PROCEDURE — U0005 INFEC AGEN DETEC AMPLI PROBE: HCPCS

## 2022-06-23 PROCEDURE — U0003 INFECTIOUS AGENT DETECTION BY NUCLEIC ACID (DNA OR RNA); SEVERE ACUTE RESPIRATORY SYNDROME CORONAVIRUS 2 (SARS-COV-2) (CORONAVIRUS DISEASE [COVID-19]), AMPLIFIED PROBE TECHNIQUE, MAKING USE OF HIGH THROUGHPUT TECHNOLOGIES AS DESCRIBED BY CMS-2020-01-R: HCPCS

## 2022-06-23 PROCEDURE — 36415 COLL VENOUS BLD VENIPUNCTURE: CPT

## 2022-06-23 PROCEDURE — 87517 HEPATITIS B DNA QUANT: CPT

## 2022-06-23 PROCEDURE — 87804 INFLUENZA ASSAY W/OPTIC: CPT | Performed by: NURSE PRACTITIONER

## 2022-06-23 PROCEDURE — 99214 OFFICE O/P EST MOD 30 MIN: CPT | Performed by: NURSE PRACTITIONER

## 2022-06-23 RX ORDER — BENZONATATE 100 MG/1
100 CAPSULE ORAL 3 TIMES DAILY PRN
Qty: 60 CAPSULE | Refills: 0 | Status: SHIPPED | OUTPATIENT
Start: 2022-06-23 | End: 2022-11-16

## 2022-06-23 RX ORDER — ALBUTEROL SULFATE 90 UG/1
2 AEROSOL, METERED RESPIRATORY (INHALATION) EVERY 6 HOURS PRN
Qty: 8.5 G | Refills: 0 | Status: SHIPPED | OUTPATIENT
Start: 2022-06-23 | End: 2022-11-16

## 2022-06-23 ASSESSMENT — FIBROSIS 4 INDEX: FIB4 SCORE: 0.31

## 2022-06-23 NOTE — PROGRESS NOTES
"Subjective:   Ita Johnson is a 46 y.o. female who presents for Cough (Cough, runny nose, fatigue x2days)       HPI  Patient presents for evaluation of 2-day history of cough, sore throat, runny nose, and fatigue.  Patient has taken home COVID test, which was negative.  She has tried over-the-counter cough and cold medication with mild relief of her symptoms per denies specific known ill contacts or exposures.  Patient is COVID vaccinated, including boosters.    ROS  All other systems are negative except as documented above within HPI.    MEDS:   Current Outpatient Medications:   •  fenofibrate (TRICOR) 145 MG Tab, TAKE 1 TABLET BY MOUTH EVERY DAY, Disp: 90 Tablet, Rfl: 1  •  metFORMIN ER (GLUCOPHAGE XR) 500 MG TABLET SR 24 HR, TAKE 2 TABLETS BY MOUTH EVERY EVENING, Disp: 180 Tablet, Rfl: 1  •  losartan-hydrochlorothiazide (HYZAAR) 100-12.5 MG per tablet, TAKE 1 TABLET BY MOUTH EVERY DAY, Disp: 90 Tablet, Rfl: 1  ALLERGIES: No Known Allergies    Patient's PMH, SocHx, SurgHx, FamHx, Drug allergies and medications were reviewed.     Objective:   /76   Pulse 98   Temp 37.7 °C (99.9 °F)   Resp 16   Ht 1.651 m (5' 5\")   Wt 67.8 kg (149 lb 6.4 oz)   SpO2 98%   BMI 24.86 kg/m²     Physical Exam  Vitals and nursing note reviewed.   Constitutional:       General: She is awake.      Appearance: Normal appearance. She is well-developed.   HENT:      Head: Normocephalic and atraumatic.      Right Ear: Tympanic membrane, ear canal and external ear normal.      Left Ear: Tympanic membrane, ear canal and external ear normal.      Nose: Nose normal. No nasal tenderness, mucosal edema, congestion or rhinorrhea.      Right Turbinates: Enlarged.      Left Turbinates: Enlarged.      Mouth/Throat:      Lips: Pink.      Mouth: Mucous membranes are moist.      Pharynx: Oropharynx is clear. Uvula midline. Posterior oropharyngeal erythema present.   Eyes:      Extraocular Movements: Extraocular movements intact.      " Conjunctiva/sclera: Conjunctivae normal.   Cardiovascular:      Rate and Rhythm: Normal rate and regular rhythm.      Pulses: Normal pulses.      Heart sounds: Normal heart sounds.   Pulmonary:      Effort: Pulmonary effort is normal.      Breath sounds: Normal breath sounds.      Comments: +freq cough  Musculoskeletal:         General: Normal range of motion.      Cervical back: Normal range of motion and neck supple.   Skin:     General: Skin is warm and dry.   Neurological:      General: No focal deficit present.      Mental Status: She is alert and oriented to person, place, and time.   Psychiatric:         Mood and Affect: Mood normal.         Behavior: Behavior normal. Behavior is cooperative.         Thought Content: Thought content normal.         Judgment: Judgment normal.         Assessment/Plan:   Assessment    1. Nonspecific syndrome suggestive of viral illness    2. Low grade fever    3. Cough  - POCT Influenza A/B-negative  - POCT SARS-COV Antigen [facility] Manual Result-negative  - SARS-CoV-2 PCR (24 hour In-House): Collect NP swab in VTM; Future  - albuterol 108 (90 Base) MCG/ACT Aero Soln inhalation aerosol; Inhale 2 Puffs every 6 hours as needed for Shortness of Breath.  Dispense: 8.5 g; Refill: 0  - benzonatate (TESSALON) 100 MG Cap; Take 1 Capsule by mouth 3 times a day as needed for Cough.  Dispense: 60 Capsule; Refill: 0    4. Rhinorrhea  - POCT Influenza A/B  - POCT SARS-COV Antigen [facility] Manual Result  - SARS-CoV-2 PCR (24 hour In-House): Collect NP swab in VTM; Future    5. Other fatigue  - POCT Influenza A/B  - POCT SARS-COV Antigen [facility] Manual Result  - SARS-CoV-2 PCR (24 hour In-House): Collect NP swab in VTM; Future    6. Decreased lung sounds  - albuterol 108 (90 Base) MCG/ACT Aero Soln inhalation aerosol; Inhale 2 Puffs every 6 hours as needed for Shortness of Breath.  Dispense: 8.5 g; Refill: 0      Vital signs stable at today's acute urgent care visit.  Reviewed test results completed in  clinic.  Will obtain PCR COVID testing.  Advised to home isolate per CDC guidelines.  Supportive care options also discussed, to include alternating Tylenol and Advil, cough/cold/flu OTC medications for symptomatic relief, in addition to rest, fluids as tolerated. Differential diagnosis, natural history, and indications for immediate follow-up were discussed.     Advised the patient to follow-up with the primary care provider for recheck, reevaluation, and/or consideration of further management if necessary. Return to urgent care with any worsening symptoms or if there is no improvement in their current condition. Red flags discussed and indications to immediately call 911 or present to the Emergency Department.  All questions were encouraged and answered to the patient's satisfaction and understanding, and they agree to the plan of care.     I personally reviewed prior external notes and test results pertinent to today's visit.  I have independently reviewed and interpreted all diagnostics ordered during this urgent care acute visit. Time spent evaluating this patient was a greater than 30 minutes and includes preparing for visit, counseling/education, exam and evaluation, obtaining history, independent interpretation and ordering lab/test/procedures.      Please note that this dictation was created using voice recognition software. I have made a reasonable attempt to correct obvious errors, but I expect that there are errors of grammar and possibly content that I did not discover before finalizing the note.

## 2022-06-23 NOTE — LETTER
June 23, 2022    Itaalexi Johnson    Your employee/student was seen in our clinic today.  A concern for COVID-19 has been identified and testing is in progress. We are asking you to excuse absences while following self-isolation protocol per Center for Disease Control (CDC) guidelines.  The patient will be able to access test results through our electronic delivery system called [employer].  Please excuse her absences on 6/22 as well due to this illness.    If the results of testing are positive then the patient should follow the CDC guidelines.  These are isolation for minimum of 5 days and at least 24 hours have passed since the last fever without the use of fever reducing medications and all other symptoms have improved.      In general, repeat testing is not necessary and not offered through our [facility]. This is the only note that will be provided from [facility] for this visit.  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 If you have any questions please do not hesitate to call me at the phone number listed below.    Sincerely,    Karen Noriega, APRN  [phone]  Electronically Signed

## 2022-06-24 DIAGNOSIS — J34.89 RHINORRHEA: ICD-10-CM

## 2022-06-24 DIAGNOSIS — R05.9 COUGH: ICD-10-CM

## 2022-06-24 DIAGNOSIS — R53.83 OTHER FATIGUE: ICD-10-CM

## 2022-06-24 LAB — COVID ORDER STATUS COVID19: NORMAL

## 2022-06-25 LAB
SARS-COV-2 RNA RESP QL NAA+PROBE: NOTDETECTED
SPECIMEN SOURCE: NORMAL

## 2022-06-26 LAB
HBV DNA SERPL NAA+PROBE-ACNC: ABNORMAL IU/ML
HBV DNA SERPL NAA+PROBE-LOG IU: 4.08 LOG IU/ML
HBV DNA SERPL QL NAA+PROBE: DETECTED

## 2022-08-17 DIAGNOSIS — E11.9 DIABETES MELLITUS WITHOUT COMPLICATION (HCC): ICD-10-CM

## 2022-08-17 DIAGNOSIS — I10 ESSENTIAL HYPERTENSION: ICD-10-CM

## 2022-08-17 RX ORDER — METFORMIN HYDROCHLORIDE 500 MG/1
TABLET, EXTENDED RELEASE ORAL
Qty: 180 TABLET | Refills: 1 | Status: SHIPPED | OUTPATIENT
Start: 2022-08-17 | End: 2022-11-16 | Stop reason: SDUPTHER

## 2022-08-17 RX ORDER — LOSARTAN POTASSIUM AND HYDROCHLOROTHIAZIDE 12.5; 1 MG/1; MG/1
TABLET ORAL
Qty: 90 TABLET | Refills: 1 | Status: SHIPPED | OUTPATIENT
Start: 2022-08-17 | End: 2023-03-19 | Stop reason: SDUPTHER

## 2022-10-25 ENCOUNTER — TELEPHONE (OUTPATIENT)
Dept: SCHEDULING | Facility: IMAGING CENTER | Age: 47
End: 2022-10-25

## 2022-11-03 ENCOUNTER — HOSPITAL ENCOUNTER (OUTPATIENT)
Dept: LAB | Facility: MEDICAL CENTER | Age: 47
End: 2022-11-03
Attending: FAMILY MEDICINE
Payer: COMMERCIAL

## 2022-11-03 ENCOUNTER — APPOINTMENT (OUTPATIENT)
Dept: RADIOLOGY | Facility: MEDICAL CENTER | Age: 47
End: 2022-11-03
Attending: FAMILY MEDICINE
Payer: COMMERCIAL

## 2022-11-03 ENCOUNTER — HOSPITAL ENCOUNTER (OUTPATIENT)
Dept: LAB | Facility: MEDICAL CENTER | Age: 47
End: 2022-11-03
Attending: STUDENT IN AN ORGANIZED HEALTH CARE EDUCATION/TRAINING PROGRAM
Payer: COMMERCIAL

## 2022-11-03 DIAGNOSIS — E55.9 VITAMIN D DEFICIENCY: ICD-10-CM

## 2022-11-03 DIAGNOSIS — I10 ESSENTIAL HYPERTENSION: ICD-10-CM

## 2022-11-03 DIAGNOSIS — R79.89 ELEVATED PLATELET COUNT: ICD-10-CM

## 2022-11-03 DIAGNOSIS — E78.5 DYSLIPIDEMIA: ICD-10-CM

## 2022-11-03 DIAGNOSIS — E11.9 DIABETES MELLITUS WITHOUT COMPLICATION (HCC): ICD-10-CM

## 2022-11-03 DIAGNOSIS — Z12.31 VISIT FOR SCREENING MAMMOGRAM: ICD-10-CM

## 2022-11-03 LAB
25(OH)D3 SERPL-MCNC: 25 NG/ML (ref 30–100)
ALBUMIN SERPL BCP-MCNC: 4.2 G/DL (ref 3.2–4.9)
ALBUMIN SERPL BCP-MCNC: 4.2 G/DL (ref 3.2–4.9)
ALBUMIN/GLOB SERPL: 1.3 G/DL
ALBUMIN/GLOB SERPL: 1.4 G/DL
ALP SERPL-CCNC: 88 U/L (ref 30–99)
ALP SERPL-CCNC: 89 U/L (ref 30–99)
ALT SERPL-CCNC: 45 U/L (ref 2–50)
ALT SERPL-CCNC: 48 U/L (ref 2–50)
ANION GAP SERPL CALC-SCNC: 13 MMOL/L (ref 7–16)
ANION GAP SERPL CALC-SCNC: 14 MMOL/L (ref 7–16)
AST SERPL-CCNC: 35 U/L (ref 12–45)
AST SERPL-CCNC: 40 U/L (ref 12–45)
BASOPHILS # BLD AUTO: 0.7 % (ref 0–1.8)
BASOPHILS # BLD: 0.07 K/UL (ref 0–0.12)
BILIRUB SERPL-MCNC: 0.2 MG/DL (ref 0.1–1.5)
BILIRUB SERPL-MCNC: 0.2 MG/DL (ref 0.1–1.5)
BUN SERPL-MCNC: 17 MG/DL (ref 8–22)
BUN SERPL-MCNC: 17 MG/DL (ref 8–22)
CALCIUM SERPL-MCNC: 9.3 MG/DL (ref 8.5–10.5)
CALCIUM SERPL-MCNC: 9.3 MG/DL (ref 8.5–10.5)
CHLORIDE SERPL-SCNC: 99 MMOL/L (ref 96–112)
CHLORIDE SERPL-SCNC: 99 MMOL/L (ref 96–112)
CHOLEST SERPL-MCNC: 218 MG/DL (ref 100–199)
CO2 SERPL-SCNC: 23 MMOL/L (ref 20–33)
CO2 SERPL-SCNC: 23 MMOL/L (ref 20–33)
CREAT SERPL-MCNC: 0.37 MG/DL (ref 0.5–1.4)
CREAT SERPL-MCNC: 0.41 MG/DL (ref 0.5–1.4)
EOSINOPHIL # BLD AUTO: 0.27 K/UL (ref 0–0.51)
EOSINOPHIL NFR BLD: 2.7 % (ref 0–6.9)
ERYTHROCYTE [DISTWIDTH] IN BLOOD BY AUTOMATED COUNT: 50.8 FL (ref 35.9–50)
EST. AVERAGE GLUCOSE BLD GHB EST-MCNC: 169 MG/DL
FASTING STATUS PATIENT QL REPORTED: NORMAL
GFR SERPLBLD CREATININE-BSD FMLA CKD-EPI: 123 ML/MIN/1.73 M 2
GFR SERPLBLD CREATININE-BSD FMLA CKD-EPI: 126 ML/MIN/1.73 M 2
GLOBULIN SER CALC-MCNC: 3.1 G/DL (ref 1.9–3.5)
GLOBULIN SER CALC-MCNC: 3.2 G/DL (ref 1.9–3.5)
GLUCOSE SERPL-MCNC: 175 MG/DL (ref 65–99)
GLUCOSE SERPL-MCNC: 176 MG/DL (ref 65–99)
HBA1C MFR BLD: 7.5 % (ref 4–5.6)
HCT VFR BLD AUTO: 45.1 % (ref 37–47)
HDLC SERPL-MCNC: 14 MG/DL
HGB BLD-MCNC: 14.6 G/DL (ref 12–16)
IMM GRANULOCYTES # BLD AUTO: 0.05 K/UL (ref 0–0.11)
IMM GRANULOCYTES NFR BLD AUTO: 0.5 % (ref 0–0.9)
LDLC SERPL CALC-MCNC: ABNORMAL MG/DL
LYMPHOCYTES # BLD AUTO: 3.06 K/UL (ref 1–4.8)
LYMPHOCYTES NFR BLD: 30.8 % (ref 22–41)
MCH RBC QN AUTO: 28.1 PG (ref 27–33)
MCHC RBC AUTO-ENTMCNC: 32.4 G/DL (ref 33.6–35)
MCV RBC AUTO: 86.7 FL (ref 81.4–97.8)
MONOCYTES # BLD AUTO: 0.79 K/UL (ref 0–0.85)
MONOCYTES NFR BLD AUTO: 8 % (ref 0–13.4)
NEUTROPHILS # BLD AUTO: 5.68 K/UL (ref 2–7.15)
NEUTROPHILS NFR BLD: 57.3 % (ref 44–72)
NRBC # BLD AUTO: 0 K/UL
NRBC BLD-RTO: 0 /100 WBC
PLATELET # BLD AUTO: 421 K/UL (ref 164–446)
PMV BLD AUTO: 9.7 FL (ref 9–12.9)
POTASSIUM SERPL-SCNC: 3.9 MMOL/L (ref 3.6–5.5)
POTASSIUM SERPL-SCNC: 3.9 MMOL/L (ref 3.6–5.5)
PROT SERPL-MCNC: 7.3 G/DL (ref 6–8.2)
PROT SERPL-MCNC: 7.4 G/DL (ref 6–8.2)
RBC # BLD AUTO: 5.2 M/UL (ref 4.2–5.4)
SODIUM SERPL-SCNC: 135 MMOL/L (ref 135–145)
SODIUM SERPL-SCNC: 136 MMOL/L (ref 135–145)
TRIGL SERPL-MCNC: 1000 MG/DL (ref 0–149)
WBC # BLD AUTO: 9.9 K/UL (ref 4.8–10.8)

## 2022-11-03 PROCEDURE — 77067 SCR MAMMO BI INCL CAD: CPT

## 2022-11-03 PROCEDURE — 80061 LIPID PANEL: CPT

## 2022-11-03 PROCEDURE — 82306 VITAMIN D 25 HYDROXY: CPT

## 2022-11-03 PROCEDURE — 85025 COMPLETE CBC W/AUTO DIFF WBC: CPT

## 2022-11-03 PROCEDURE — 87350 HEPATITIS BE AG IA: CPT

## 2022-11-03 PROCEDURE — 36415 COLL VENOUS BLD VENIPUNCTURE: CPT

## 2022-11-03 PROCEDURE — 80053 COMPREHEN METABOLIC PANEL: CPT | Mod: 91

## 2022-11-03 PROCEDURE — 80053 COMPREHEN METABOLIC PANEL: CPT

## 2022-11-03 PROCEDURE — 83036 HEMOGLOBIN GLYCOSYLATED A1C: CPT

## 2022-11-05 LAB — HBV E AG SERPL QL IA: NEGATIVE

## 2022-11-16 ENCOUNTER — OFFICE VISIT (OUTPATIENT)
Dept: MEDICAL GROUP | Facility: MEDICAL CENTER | Age: 47
End: 2022-11-16
Payer: COMMERCIAL

## 2022-11-16 ENCOUNTER — HOSPITAL ENCOUNTER (OUTPATIENT)
Facility: MEDICAL CENTER | Age: 47
End: 2022-11-16
Attending: FAMILY MEDICINE
Payer: COMMERCIAL

## 2022-11-16 VITALS
TEMPERATURE: 98 F | DIASTOLIC BLOOD PRESSURE: 64 MMHG | BODY MASS INDEX: 24.98 KG/M2 | HEART RATE: 86 BPM | OXYGEN SATURATION: 97 % | HEIGHT: 65 IN | WEIGHT: 149.91 LBS | RESPIRATION RATE: 16 BRPM | SYSTOLIC BLOOD PRESSURE: 118 MMHG

## 2022-11-16 DIAGNOSIS — Z12.11 SCREENING FOR COLORECTAL CANCER: ICD-10-CM

## 2022-11-16 DIAGNOSIS — Z12.12 SCREENING FOR COLORECTAL CANCER: ICD-10-CM

## 2022-11-16 DIAGNOSIS — Z23 NEED FOR VACCINATION: ICD-10-CM

## 2022-11-16 DIAGNOSIS — E78.5 DYSLIPIDEMIA ASSOCIATED WITH TYPE 2 DIABETES MELLITUS (HCC): ICD-10-CM

## 2022-11-16 DIAGNOSIS — I10 PRIMARY HYPERTENSION: ICD-10-CM

## 2022-11-16 DIAGNOSIS — E11.69 TYPE 2 DIABETES MELLITUS WITH OTHER SPECIFIED COMPLICATION, WITHOUT LONG-TERM CURRENT USE OF INSULIN (HCC): ICD-10-CM

## 2022-11-16 DIAGNOSIS — L65.9 HAIR LOSS: ICD-10-CM

## 2022-11-16 DIAGNOSIS — Z11.59 NEED FOR HEPATITIS C SCREENING TEST: ICD-10-CM

## 2022-11-16 DIAGNOSIS — E55.9 VITAMIN D DEFICIENCY: ICD-10-CM

## 2022-11-16 DIAGNOSIS — E11.69 DYSLIPIDEMIA ASSOCIATED WITH TYPE 2 DIABETES MELLITUS (HCC): ICD-10-CM

## 2022-11-16 PROCEDURE — 90677 PCV20 VACCINE IM: CPT | Performed by: FAMILY MEDICINE

## 2022-11-16 PROCEDURE — 90686 IIV4 VACC NO PRSV 0.5 ML IM: CPT | Performed by: FAMILY MEDICINE

## 2022-11-16 PROCEDURE — 90471 IMMUNIZATION ADMIN: CPT | Performed by: FAMILY MEDICINE

## 2022-11-16 PROCEDURE — 99214 OFFICE O/P EST MOD 30 MIN: CPT | Mod: 25 | Performed by: FAMILY MEDICINE

## 2022-11-16 PROCEDURE — 82570 ASSAY OF URINE CREATININE: CPT

## 2022-11-16 PROCEDURE — 82043 UR ALBUMIN QUANTITATIVE: CPT

## 2022-11-16 PROCEDURE — 90472 IMMUNIZATION ADMIN EACH ADD: CPT | Performed by: FAMILY MEDICINE

## 2022-11-16 RX ORDER — GEMFIBROZIL 600 MG/1
600 TABLET, FILM COATED ORAL 2 TIMES DAILY
Qty: 180 TABLET | Refills: 1 | Status: SHIPPED | OUTPATIENT
Start: 2022-11-16 | End: 2023-02-14 | Stop reason: SDUPTHER

## 2022-11-16 RX ORDER — ERGOCALCIFEROL 1.25 MG/1
50000 CAPSULE ORAL
Qty: 12 CAPSULE | Refills: 1 | Status: SHIPPED | OUTPATIENT
Start: 2022-11-16 | End: 2023-05-08

## 2022-11-16 RX ORDER — METFORMIN HYDROCHLORIDE 500 MG/1
1000 TABLET, EXTENDED RELEASE ORAL 2 TIMES DAILY
Qty: 180 TABLET | Refills: 3 | Status: SHIPPED | OUTPATIENT
Start: 2022-11-16 | End: 2022-11-16

## 2022-11-16 RX ORDER — METFORMIN HYDROCHLORIDE 500 MG/1
TABLET, EXTENDED RELEASE ORAL
Qty: 360 TABLET | Refills: 3 | Status: SHIPPED | OUTPATIENT
Start: 2022-11-16 | End: 2023-05-25 | Stop reason: SDUPTHER

## 2022-11-16 ASSESSMENT — ENCOUNTER SYMPTOMS
PALPITATIONS: 0
CHILLS: 0
WHEEZING: 0
COUGH: 0
FEVER: 0
SHORTNESS OF BREATH: 0

## 2022-11-16 ASSESSMENT — FIBROSIS 4 INDEX: FIB4 SCORE: 0.63

## 2022-11-16 NOTE — ASSESSMENT & PLAN NOTE
New problem, uncontrolled, check thyroid function test.  Continue multivitamins.  She has low vitamin D level which contributes to hair loss also.

## 2022-11-16 NOTE — ASSESSMENT & PLAN NOTE
Chronic problem, uncontrolled, goal A1c is less than 7.  Increase metformin 2000 mg 2 times daily.  Add Jardiance 25 mg daily.  We will do foot exam at next visit.  Request eye exam records.  Follow-up in 3 months.  She smokes 1/2 pack a day, she tried medication before and had side effects with it.  Counseled regarding smoking cessation.  We will try to get her diabetes and hyperlipidemia in control first and will discuss about Wellbutrin medication at upcoming visits.

## 2022-11-16 NOTE — TELEPHONE ENCOUNTER
Received request via: Patient    Was the patient seen in the last year in this department? Yes    Does the patient have an active prescription (recently filled or refills available) for medication(s) requested? No    Does the patient have FDC Plus and need 100 day supply (blood pressure, diabetes and cholesterol meds only)? Patient does not have SCP     no

## 2022-11-16 NOTE — PROGRESS NOTES
FAMILY MEDICINE VISIT                                                               Chief complaint::Diagnoses of Type 2 diabetes mellitus with other specified complication, without long-term current use of insulin (HCC), Dyslipidemia associated with type 2 diabetes mellitus (HCC), Primary hypertension, Vitamin D deficiency, Need for hepatitis C screening test, Need for vaccination, Screening for colorectal cancer, and Hair loss were pertinent to this visit.    History of present illness: Ita Johnson is a 47 y.o. female who presented for labs for diabetes, cholesterols, hypertension, hair loss    Problem   Hair Loss    She has been experiencing a lot of hair loss.  She has acne over her face also.  She has been taking multivitamins.     DM Type 2 (Diabetes Mellitus, Type 2) ([facility])    Recent A1c came back at 7.5.  She is on metformin 1000 mg daily.  She eats a lot of rice.  Physically active at work.    She did eye exam with ophthalmology at [facility].    Lab Results   Component Value Date/Time    HBA1C 7.5 (H) 11/03/2022 06:47 AM         Dyslipidemia associated with type 2 diabetes mellitus (HCC)    Recent cholesterol panel showed significantly elevated triglyceride level.  She reports that she loves to eat rice and she is on fenofibrate 145 mg daily    Lab Results   Component Value Date/Time    CHOLSTRLTOT 218 (H) 11/03/2022 0647    TRIGLYCERIDE 1000 (H) 11/03/2022 0647    HDL 14 (A) 11/03/2022 0647    LDL see below 11/03/2022 0647        Htn (Hypertension)    Blood pressure today came back at 118/64.  She is taking losartan hydrochlorothiazide 112.5 mg daily.  No side effects with medication.  No chest pain palpitations, shortness of breath, lower leg swelling.     Vitamin D Deficiency    Recent vitamin D level came back at 25.  She is taking vitamin D 4000 international units daily.            Review of systems:     Review of Systems   Constitutional:  Negative for chills, fever and malaise/fatigue.  "  Respiratory:  Negative for cough, shortness of breath and wheezing.    Cardiovascular:  Negative for chest pain, palpitations and leg swelling.   Endo/Heme/Allergies:         Hair loss      Medications and Allergies:     Current Outpatient Medications   Medication Sig Dispense Refill    metFORMIN ER (GLUCOPHAGE XR) 500 MG TABLET SR 24 HR Take 2 Tablets by mouth 2 times a day. 180 Tablet 3    Empagliflozin 25 MG Tab Take 1 Tablet by mouth every day. 90 Tablet 3    gemfibrozil (LOPID) 600 MG Tab Take 1 Tablet by mouth 2 times a day. 180 Tablet 1    vitamin D2, Ergocalciferol, (DRISDOL) 1.25 MG (72979 UT) Cap capsule Take 1 Capsule by mouth every 7 days. 12 Capsule 1    losartan-hydrochlorothiazide (HYZAAR) 100-12.5 MG per tablet TAKE 1 TABLET BY MOUTH EVERY DAY 90 Tablet 1     No current facility-administered medications for this visit.          Vitals:    /64 (BP Location: Left arm, Patient Position: Sitting, BP Cuff Size: Adult)   Pulse 86   Temp 36.7 °C (98 °F)   Resp 16   Ht 1.651 m (5' 5\")   Wt 68 kg (149 lb 14.6 oz)   SpO2 97%  Body mass index is 24.95 kg/m².    Physical Exam:     Physical Exam     Labs:  I reviewed with patient recent labs resulted on    Assessment/Plan:         Problem List Items Addressed This Visit       Vitamin D deficiency     Chronic problem, uncontrolled, start vitamin D 50,000 international units every 7 days.  Recheck labs in 3-month         Relevant Medications    vitamin D2, Ergocalciferol, (DRISDOL) 1.25 MG (34302 UT) Cap capsule    Other Relevant Orders    VITAMIN D,25 HYDROXY (DEFICIENCY)    HTN (hypertension)     Chronic problem, stable, continue losartan hydrochlorothiazide 100-12.5 mg daily.  Goal blood pressure is less than 140/90         Relevant Medications    gemfibrozil (LOPID) 600 MG Tab    Hair loss     New problem, uncontrolled, check thyroid function test.  Continue multivitamins.  She has low vitamin D level which contributes to hair loss also.         " Relevant Orders    TSH WITH REFLEX TO FT4    Dyslipidemia associated with type 2 diabetes mellitus (HCC)     Chronic problem, uncontrolled triglyceride level.  Discussed risk of pancreatitis with this most triglyceride level.  Recommended to cut back on rice.  Discussed to switch to parboiled rice/brown rice.  Stop fenofibrate.  Start gemfibrozil 600 mg 2 times daily.  Recheck labs in 3-month         Relevant Medications    metFORMIN ER (GLUCOPHAGE XR) 500 MG TABLET SR 24 HR    Empagliflozin 25 MG Tab    gemfibrozil (LOPID) 600 MG Tab    Other Relevant Orders    Comp Metabolic Panel    Lipid Profile    DM type 2 (diabetes mellitus, type 2) (HCC)     Chronic problem, uncontrolled, goal A1c is less than 7.  Increase metformin 2000 mg 2 times daily.  Add Jardiance 25 mg daily.  We will do foot exam at next visit.  Request eye exam records.  Follow-up in 3 months.  She smokes 1/2 pack a day, she tried medication before and had side effects with it.  Counseled regarding smoking cessation.  We will try to get her diabetes and hyperlipidemia in control first and will discuss about Wellbutrin medication at upcoming visits.         Relevant Medications    metFORMIN ER (GLUCOPHAGE XR) 500 MG TABLET SR 24 HR    Empagliflozin 25 MG Tab    Other Relevant Orders    Comp Metabolic Panel    HEMOGLOBIN A1C    MICROALBUMIN CREAT RATIO URINE     Other Visit Diagnoses       Need for hepatitis C screening test        Relevant Orders    HEP C VIRUS ANTIBODY    Need for vaccination        Relevant Orders    INFLUENZA VACCINE QUAD INJ (PF)    Pneumococcal Conjugate Vaccine 20-Valent (19 yrs+)    Screening for colorectal cancer        Relevant Orders    Referral to GI for Colonoscopy             Please note that this dictation was created using voice recognition software. I have made every reasonable attempt to correct obvious errors, but I expect that there are errors of grammar and possibly content that I did not discover before  finalizing the note.    Follow up in 3 months for lab follow-up.

## 2022-11-16 NOTE — LETTER
[facility]  Eleuterio Romeo M.D.  [address]  [place] [address]  Fax: [phone]   Authorization for Release/Disclosure of   Protected Health Information   Name: ITA MAYS : 1975 SSN: [id number]   Address: [address] [place], [place] [address] [address]  [place] [address] Phone:    [phone] (home)    I authorize the entity listed below to release/disclose the PHI below to:   [facility]/Eleuterio Romeo M.D. and Eleuterio Romeo M.D.   Provider or Entity Name:     Address   City, State, Zip   Phone:      Fax:     Reason for request: continuity of care   Information to be released:    [  ] LAST COLONOSCOPY,  including any PATH REPORT and follow-up  [  ] LAST FIT/COLOGUARD RESULT [  ] LAST DEXA  [  ] LAST MAMMOGRAM  [  ] LAST PAP  [  ] LAST LABS [  ] RETINA EXAM REPORT  [  ] IMMUNIZATION RECORDS  [  ] Release all info      [  ] Check here and initial the line next to each item to release ALL health information INCLUDING  _____ Care and treatment for drug and / or alcohol abuse  _____ HIV testing, infection status, or AIDS  _____ Genetic Testing    DATES OF SERVICE OR TIME PERIOD TO BE DISCLOSED: _____________  I understand and acknowledge that:  * This Authorization may be revoked at any time by you in writing, except if your health information has already been used or disclosed.  * Your health information that will be used or disclosed as a result of you signing this authorization could be re-disclosed by the recipient. If this occurs, your re-disclosed health information may no longer be protected by State or Federal laws.  * You may refuse to sign this Authorization. Your refusal will not affect your ability to obtain treatment.  * This Authorization becomes effective upon signing and will  on (date) __________.      If no date is indicated, this Authorization will  one (1) year from the signature date.    Name: Ita Mays    Signature:   Date:     2022       PLEASE FAX  REQUESTED RECORDS BACK TO: [phone]

## 2022-11-16 NOTE — ASSESSMENT & PLAN NOTE
Chronic problem, uncontrolled, start vitamin D 50,000 international units every 7 days.  Recheck labs in 3-month

## 2022-11-16 NOTE — ASSESSMENT & PLAN NOTE
Chronic problem, uncontrolled triglyceride level.  Discussed risk of pancreatitis with this most triglyceride level.  Recommended to cut back on rice.  Discussed to switch to parboiled rice/brown rice.  Stop fenofibrate.  Start gemfibrozil 600 mg 2 times daily.  Recheck labs in 3-month

## 2022-11-16 NOTE — ASSESSMENT & PLAN NOTE
Chronic problem, stable, continue losartan hydrochlorothiazide 100-12.5 mg daily.  Goal blood pressure is less than 140/90

## 2022-11-17 LAB
CREAT UR-MCNC: 55.64 MG/DL
MICROALBUMIN UR-MCNC: <1.2 MG/DL
MICROALBUMIN/CREAT UR: NORMAL MG/G (ref 0–30)

## 2023-02-06 ENCOUNTER — HOSPITAL ENCOUNTER (OUTPATIENT)
Dept: LAB | Facility: MEDICAL CENTER | Age: 48
End: 2023-02-06
Attending: FAMILY MEDICINE
Payer: COMMERCIAL

## 2023-02-06 DIAGNOSIS — E11.69 DYSLIPIDEMIA ASSOCIATED WITH TYPE 2 DIABETES MELLITUS (HCC): ICD-10-CM

## 2023-02-06 DIAGNOSIS — E55.9 VITAMIN D DEFICIENCY: ICD-10-CM

## 2023-02-06 DIAGNOSIS — L65.9 HAIR LOSS: ICD-10-CM

## 2023-02-06 DIAGNOSIS — E78.5 DYSLIPIDEMIA ASSOCIATED WITH TYPE 2 DIABETES MELLITUS (HCC): ICD-10-CM

## 2023-02-06 DIAGNOSIS — E11.69 TYPE 2 DIABETES MELLITUS WITH OTHER SPECIFIED COMPLICATION, WITHOUT LONG-TERM CURRENT USE OF INSULIN (HCC): ICD-10-CM

## 2023-02-06 DIAGNOSIS — Z11.59 NEED FOR HEPATITIS C SCREENING TEST: ICD-10-CM

## 2023-02-06 LAB
25(OH)D3 SERPL-MCNC: 38 NG/ML (ref 30–100)
ALBUMIN SERPL BCP-MCNC: 4.4 G/DL (ref 3.2–4.9)
ALBUMIN SERPL BCP-MCNC: 4.6 G/DL (ref 3.2–4.9)
ALBUMIN/GLOB SERPL: 1.4 G/DL
ALP SERPL-CCNC: 76 U/L (ref 30–99)
ALP SERPL-CCNC: 78 U/L (ref 30–99)
ALT SERPL-CCNC: 15 U/L (ref 2–50)
ALT SERPL-CCNC: 15 U/L (ref 2–50)
ANION GAP SERPL CALC-SCNC: 13 MMOL/L (ref 7–16)
AST SERPL-CCNC: 13 U/L (ref 12–45)
AST SERPL-CCNC: 14 U/L (ref 12–45)
BILIRUB CONJ SERPL-MCNC: <0.2 MG/DL (ref 0.1–0.5)
BILIRUB INDIRECT SERPL-MCNC: NORMAL MG/DL (ref 0–1)
BILIRUB SERPL-MCNC: 0.3 MG/DL (ref 0.1–1.5)
BILIRUB SERPL-MCNC: 0.3 MG/DL (ref 0.1–1.5)
BUN SERPL-MCNC: 21 MG/DL (ref 8–22)
CALCIUM ALBUM COR SERPL-MCNC: 8.6 MG/DL (ref 8.5–10.5)
CALCIUM SERPL-MCNC: 9.1 MG/DL (ref 8.4–10.2)
CHLORIDE SERPL-SCNC: 102 MMOL/L (ref 96–112)
CHOLEST SERPL-MCNC: 77 MG/DL (ref 100–199)
CO2 SERPL-SCNC: 22 MMOL/L (ref 20–33)
CREAT SERPL-MCNC: 0.44 MG/DL (ref 0.5–1.4)
EST. AVERAGE GLUCOSE BLD GHB EST-MCNC: 131 MG/DL
FASTING STATUS PATIENT QL REPORTED: NORMAL
GFR SERPLBLD CREATININE-BSD FMLA CKD-EPI: 120 ML/MIN/1.73 M 2
GLOBULIN SER CALC-MCNC: 3.4 G/DL (ref 1.9–3.5)
GLUCOSE SERPL-MCNC: 126 MG/DL (ref 65–99)
HBA1C MFR BLD: 6.2 % (ref 4–5.6)
HCV AB SER QL: NORMAL
HDLC SERPL-MCNC: 31 MG/DL
LDLC SERPL CALC-MCNC: 29 MG/DL
POTASSIUM SERPL-SCNC: 4 MMOL/L (ref 3.6–5.5)
PROT SERPL-MCNC: 7.9 G/DL (ref 6–8.2)
PROT SERPL-MCNC: 8 G/DL (ref 6–8.2)
SODIUM SERPL-SCNC: 137 MMOL/L (ref 135–145)
TRIGL SERPL-MCNC: 85 MG/DL (ref 0–149)
TSH SERPL DL<=0.005 MIU/L-ACNC: 1.42 UIU/ML (ref 0.38–5.33)

## 2023-02-06 PROCEDURE — 87350 HEPATITIS BE AG IA: CPT

## 2023-02-06 PROCEDURE — 82306 VITAMIN D 25 HYDROXY: CPT

## 2023-02-06 PROCEDURE — 80053 COMPREHEN METABOLIC PANEL: CPT

## 2023-02-06 PROCEDURE — 83036 HEMOGLOBIN GLYCOSYLATED A1C: CPT

## 2023-02-06 PROCEDURE — 36415 COLL VENOUS BLD VENIPUNCTURE: CPT

## 2023-02-06 PROCEDURE — 80076 HEPATIC FUNCTION PANEL: CPT

## 2023-02-06 PROCEDURE — 80061 LIPID PANEL: CPT

## 2023-02-06 PROCEDURE — 87517 HEPATITIS B DNA QUANT: CPT

## 2023-02-06 PROCEDURE — 86803 HEPATITIS C AB TEST: CPT

## 2023-02-06 PROCEDURE — 87912 NFCT AGT GNTYP ALYS HEP B: CPT

## 2023-02-06 PROCEDURE — 82105 ALPHA-FETOPROTEIN SERUM: CPT

## 2023-02-06 PROCEDURE — 84443 ASSAY THYROID STIM HORMONE: CPT

## 2023-02-07 LAB
AFP-TM SERPL-MCNC: 2 NG/ML (ref 0–9)
HBV DNA SERPL NAA+PROBE-ACNC: ABNORMAL IU/ML
HBV DNA SERPL NAA+PROBE-LOG IU: 4.47 LOG IU/ML
HBV DNA SERPL QL NAA+PROBE: DETECTED
HBV E AG SERPL QL IA: NEGATIVE

## 2023-02-13 ENCOUNTER — APPOINTMENT (OUTPATIENT)
Dept: MEDICAL GROUP | Facility: MEDICAL CENTER | Age: 48
End: 2023-02-13
Payer: COMMERCIAL

## 2023-02-13 LAB — MISCELLANEOUS LAB RESULT MISCLAB: NORMAL

## 2023-02-14 DIAGNOSIS — E11.69 DYSLIPIDEMIA ASSOCIATED WITH TYPE 2 DIABETES MELLITUS (HCC): ICD-10-CM

## 2023-02-14 DIAGNOSIS — E78.5 DYSLIPIDEMIA ASSOCIATED WITH TYPE 2 DIABETES MELLITUS (HCC): ICD-10-CM

## 2023-02-14 RX ORDER — GEMFIBROZIL 600 MG/1
600 TABLET, FILM COATED ORAL 2 TIMES DAILY
Qty: 180 TABLET | Refills: 3 | Status: SHIPPED | OUTPATIENT
Start: 2023-02-14 | End: 2023-06-05

## 2023-02-27 ENCOUNTER — APPOINTMENT (OUTPATIENT)
Dept: MEDICAL GROUP | Facility: MEDICAL CENTER | Age: 48
End: 2023-02-27
Payer: COMMERCIAL

## 2023-03-05 ENCOUNTER — APPOINTMENT (OUTPATIENT)
Dept: RADIOLOGY | Facility: MEDICAL CENTER | Age: 48
End: 2023-03-05
Payer: COMMERCIAL

## 2023-03-17 ENCOUNTER — PATIENT MESSAGE (OUTPATIENT)
Dept: MEDICAL GROUP | Facility: MEDICAL CENTER | Age: 48
End: 2023-03-17
Payer: COMMERCIAL

## 2023-03-17 DIAGNOSIS — I10 ESSENTIAL HYPERTENSION: ICD-10-CM

## 2023-03-19 RX ORDER — LOSARTAN POTASSIUM AND HYDROCHLOROTHIAZIDE 12.5; 1 MG/1; MG/1
1 TABLET ORAL
Qty: 90 TABLET | Refills: 0 | Status: SHIPPED | OUTPATIENT
Start: 2023-03-19 | End: 2023-06-05 | Stop reason: SDUPTHER

## 2023-04-04 ENCOUNTER — APPOINTMENT (OUTPATIENT)
Dept: RADIOLOGY | Facility: MEDICAL CENTER | Age: 48
End: 2023-04-04
Attending: NURSE PRACTITIONER
Payer: COMMERCIAL

## 2023-04-04 ENCOUNTER — HOSPITAL ENCOUNTER (OUTPATIENT)
Facility: MEDICAL CENTER | Age: 48
End: 2023-04-05
Attending: STUDENT IN AN ORGANIZED HEALTH CARE EDUCATION/TRAINING PROGRAM | Admitting: INTERNAL MEDICINE
Payer: COMMERCIAL

## 2023-04-04 ENCOUNTER — APPOINTMENT (OUTPATIENT)
Dept: RADIOLOGY | Facility: MEDICAL CENTER | Age: 48
End: 2023-04-04
Attending: STUDENT IN AN ORGANIZED HEALTH CARE EDUCATION/TRAINING PROGRAM
Payer: COMMERCIAL

## 2023-04-04 DIAGNOSIS — R11.2 NAUSEA AND VOMITING, UNSPECIFIED VOMITING TYPE: ICD-10-CM

## 2023-04-04 DIAGNOSIS — K85.90 ACUTE PANCREATITIS, UNSPECIFIED COMPLICATION STATUS, UNSPECIFIED PANCREATITIS TYPE: ICD-10-CM

## 2023-04-04 DIAGNOSIS — E86.0 DEHYDRATION: ICD-10-CM

## 2023-04-04 DIAGNOSIS — D21.9 MYOMA: ICD-10-CM

## 2023-04-04 PROBLEM — E78.5 HYPERLIPIDEMIA: Status: ACTIVE | Noted: 2023-04-04

## 2023-04-04 PROBLEM — D75.1 POLYCYTHEMIA: Status: ACTIVE | Noted: 2023-04-04

## 2023-04-04 PROBLEM — K85.30 DRUG INDUCED ACUTE PANCREATITIS WITHOUT NECROSIS OR INFECTION: Status: ACTIVE | Noted: 2023-04-04

## 2023-04-04 PROBLEM — E87.6 HYPOKALEMIA: Status: ACTIVE | Noted: 2023-04-04

## 2023-04-04 LAB
ALBUMIN SERPL BCP-MCNC: 4.4 G/DL (ref 3.2–4.9)
ALBUMIN/GLOB SERPL: 1.2 G/DL
ALP SERPL-CCNC: 78 U/L (ref 30–99)
ALT SERPL-CCNC: 19 U/L (ref 2–50)
ANION GAP SERPL CALC-SCNC: 13 MMOL/L (ref 7–16)
APPEARANCE UR: CLEAR
AST SERPL-CCNC: 16 U/L (ref 12–45)
BACTERIA #/AREA URNS HPF: ABNORMAL /HPF
BASOPHILS # BLD AUTO: 0.2 % (ref 0–1.8)
BASOPHILS # BLD: 0.02 K/UL (ref 0–0.12)
BILIRUB SERPL-MCNC: 0.2 MG/DL (ref 0.1–1.5)
BILIRUB UR QL STRIP.AUTO: NEGATIVE
BUN SERPL-MCNC: 19 MG/DL (ref 8–22)
CALCIUM ALBUM COR SERPL-MCNC: 8.9 MG/DL (ref 8.5–10.5)
CALCIUM SERPL-MCNC: 9.2 MG/DL (ref 8.4–10.2)
CHLORIDE SERPL-SCNC: 101 MMOL/L (ref 96–112)
CO2 SERPL-SCNC: 24 MMOL/L (ref 20–33)
COLOR UR: YELLOW
CREAT SERPL-MCNC: 0.55 MG/DL (ref 0.5–1.4)
EKG IMPRESSION: NORMAL
EOSINOPHIL # BLD AUTO: 0.3 K/UL (ref 0–0.51)
EOSINOPHIL NFR BLD: 3.1 % (ref 0–6.9)
EPI CELLS #/AREA URNS HPF: ABNORMAL /HPF
ERYTHROCYTE [DISTWIDTH] IN BLOOD BY AUTOMATED COUNT: 47.9 FL (ref 35.9–50)
GFR SERPLBLD CREATININE-BSD FMLA CKD-EPI: 114 ML/MIN/1.73 M 2
GLOBULIN SER CALC-MCNC: 3.6 G/DL (ref 1.9–3.5)
GLUCOSE BLD STRIP.AUTO-MCNC: 83 MG/DL (ref 65–99)
GLUCOSE BLD STRIP.AUTO-MCNC: 91 MG/DL (ref 65–99)
GLUCOSE SERPL-MCNC: 183 MG/DL (ref 65–99)
GLUCOSE UR STRIP.AUTO-MCNC: >=1000 MG/DL
HCG SERPL QL: NEGATIVE
HCT VFR BLD AUTO: 50.8 % (ref 37–47)
HGB BLD-MCNC: 16.6 G/DL (ref 12–16)
IMM GRANULOCYTES # BLD AUTO: 0.05 K/UL (ref 0–0.11)
IMM GRANULOCYTES NFR BLD AUTO: 0.5 % (ref 0–0.9)
KETONES UR STRIP.AUTO-MCNC: NEGATIVE MG/DL
LEUKOCYTE ESTERASE UR QL STRIP.AUTO: NEGATIVE
LIPASE SERPL-CCNC: 2238 U/L (ref 7–58)
LYMPHOCYTES # BLD AUTO: 3.62 K/UL (ref 1–4.8)
LYMPHOCYTES NFR BLD: 37.9 % (ref 22–41)
MAGNESIUM SERPL-MCNC: 2.1 MG/DL (ref 1.5–2.5)
MCH RBC QN AUTO: 28.1 PG (ref 27–33)
MCHC RBC AUTO-ENTMCNC: 32.7 G/DL (ref 33.6–35)
MCV RBC AUTO: 86 FL (ref 81.4–97.8)
MICRO URNS: ABNORMAL
MONOCYTES # BLD AUTO: 0.56 K/UL (ref 0–0.85)
MONOCYTES NFR BLD AUTO: 5.9 % (ref 0–13.4)
NEUTROPHILS # BLD AUTO: 5 K/UL (ref 2–7.15)
NEUTROPHILS NFR BLD: 52.4 % (ref 44–72)
NITRITE UR QL STRIP.AUTO: NEGATIVE
NRBC # BLD AUTO: 0 K/UL
NRBC BLD-RTO: 0 /100 WBC
PH UR STRIP.AUTO: 5.5 [PH] (ref 5–8)
PLATELET # BLD AUTO: 444 K/UL (ref 164–446)
PMV BLD AUTO: 8.5 FL (ref 9–12.9)
POTASSIUM SERPL-SCNC: 3.2 MMOL/L (ref 3.6–5.5)
PROT SERPL-MCNC: 8 G/DL (ref 6–8.2)
PROT UR QL STRIP: NEGATIVE MG/DL
RBC # BLD AUTO: 5.91 M/UL (ref 4.2–5.4)
RBC # URNS HPF: ABNORMAL /HPF
RBC UR QL AUTO: ABNORMAL
SODIUM SERPL-SCNC: 138 MMOL/L (ref 135–145)
SP GR UR STRIP.AUTO: 1.02
TRIGL SERPL-MCNC: 197 MG/DL (ref 0–149)
WBC # BLD AUTO: 9.6 K/UL (ref 4.8–10.8)
WBC #/AREA URNS HPF: ABNORMAL /HPF

## 2023-04-04 PROCEDURE — G0378 HOSPITAL OBSERVATION PER HR: HCPCS

## 2023-04-04 PROCEDURE — 700117 HCHG RX CONTRAST REV CODE 255: Performed by: NURSE PRACTITIONER

## 2023-04-04 PROCEDURE — 36415 COLL VENOUS BLD VENIPUNCTURE: CPT

## 2023-04-04 PROCEDURE — 99222 1ST HOSP IP/OBS MODERATE 55: CPT | Mod: FS | Performed by: INTERNAL MEDICINE

## 2023-04-04 PROCEDURE — A9270 NON-COVERED ITEM OR SERVICE: HCPCS | Performed by: NURSE PRACTITIONER

## 2023-04-04 PROCEDURE — 82962 GLUCOSE BLOOD TEST: CPT

## 2023-04-04 PROCEDURE — 93010 ELECTROCARDIOGRAM REPORT: CPT | Performed by: INTERNAL MEDICINE

## 2023-04-04 PROCEDURE — 700102 HCHG RX REV CODE 250 W/ 637 OVERRIDE(OP): Performed by: NURSE PRACTITIONER

## 2023-04-04 PROCEDURE — 83735 ASSAY OF MAGNESIUM: CPT

## 2023-04-04 PROCEDURE — 96375 TX/PRO/DX INJ NEW DRUG ADDON: CPT

## 2023-04-04 PROCEDURE — 84703 CHORIONIC GONADOTROPIN ASSAY: CPT

## 2023-04-04 PROCEDURE — 94760 N-INVAS EAR/PLS OXIMETRY 1: CPT

## 2023-04-04 PROCEDURE — 74177 CT ABD & PELVIS W/CONTRAST: CPT

## 2023-04-04 PROCEDURE — 81001 URINALYSIS AUTO W/SCOPE: CPT

## 2023-04-04 PROCEDURE — 80053 COMPREHEN METABOLIC PANEL: CPT

## 2023-04-04 PROCEDURE — 99285 EMERGENCY DEPT VISIT HI MDM: CPT

## 2023-04-04 PROCEDURE — 700105 HCHG RX REV CODE 258: Performed by: INTERNAL MEDICINE

## 2023-04-04 PROCEDURE — 700105 HCHG RX REV CODE 258: Performed by: STUDENT IN AN ORGANIZED HEALTH CARE EDUCATION/TRAINING PROGRAM

## 2023-04-04 PROCEDURE — 700111 HCHG RX REV CODE 636 W/ 250 OVERRIDE (IP): Performed by: STUDENT IN AN ORGANIZED HEALTH CARE EDUCATION/TRAINING PROGRAM

## 2023-04-04 PROCEDURE — 83690 ASSAY OF LIPASE: CPT

## 2023-04-04 PROCEDURE — 85025 COMPLETE CBC W/AUTO DIFF WBC: CPT

## 2023-04-04 PROCEDURE — 96365 THER/PROPH/DIAG IV INF INIT: CPT

## 2023-04-04 PROCEDURE — 84478 ASSAY OF TRIGLYCERIDES: CPT

## 2023-04-04 PROCEDURE — 93005 ELECTROCARDIOGRAM TRACING: CPT | Performed by: NURSE PRACTITIONER

## 2023-04-04 RX ORDER — ONDANSETRON 2 MG/ML
4 INJECTION INTRAMUSCULAR; INTRAVENOUS EVERY 4 HOURS PRN
Status: DISCONTINUED | OUTPATIENT
Start: 2023-04-04 | End: 2023-04-05 | Stop reason: HOSPADM

## 2023-04-04 RX ORDER — IBUPROFEN 200 MG
400 TABLET ORAL EVERY 6 HOURS PRN
COMMUNITY
End: 2023-12-26

## 2023-04-04 RX ORDER — AMOXICILLIN 250 MG
2 CAPSULE ORAL 2 TIMES DAILY PRN
Status: DISCONTINUED | OUTPATIENT
Start: 2023-04-04 | End: 2023-04-05 | Stop reason: HOSPADM

## 2023-04-04 RX ORDER — HYDROCHLOROTHIAZIDE 12.5 MG/1
12.5 TABLET ORAL EVERY EVENING
Status: DISCONTINUED | OUTPATIENT
Start: 2023-04-04 | End: 2023-04-05 | Stop reason: HOSPADM

## 2023-04-04 RX ORDER — OXYCODONE HYDROCHLORIDE 5 MG/1
2.5 TABLET ORAL
Status: DISCONTINUED | OUTPATIENT
Start: 2023-04-04 | End: 2023-04-05 | Stop reason: HOSPADM

## 2023-04-04 RX ORDER — KETOROLAC TROMETHAMINE 30 MG/ML
30 INJECTION, SOLUTION INTRAMUSCULAR; INTRAVENOUS EVERY 6 HOURS PRN
Status: DISCONTINUED | OUTPATIENT
Start: 2023-04-04 | End: 2023-04-05 | Stop reason: HOSPADM

## 2023-04-04 RX ORDER — SODIUM CHLORIDE, SODIUM LACTATE, POTASSIUM CHLORIDE, CALCIUM CHLORIDE 600; 310; 30; 20 MG/100ML; MG/100ML; MG/100ML; MG/100ML
INJECTION, SOLUTION INTRAVENOUS CONTINUOUS
Status: DISCONTINUED | OUTPATIENT
Start: 2023-04-04 | End: 2023-04-05 | Stop reason: HOSPADM

## 2023-04-04 RX ORDER — ENOXAPARIN SODIUM 100 MG/ML
40 INJECTION SUBCUTANEOUS DAILY
Status: DISCONTINUED | OUTPATIENT
Start: 2023-04-04 | End: 2023-04-05 | Stop reason: HOSPADM

## 2023-04-04 RX ORDER — ACETAMINOPHEN 325 MG/1
650 TABLET ORAL EVERY 6 HOURS PRN
Status: DISCONTINUED | OUTPATIENT
Start: 2023-04-04 | End: 2023-04-05 | Stop reason: HOSPADM

## 2023-04-04 RX ORDER — INSULIN LISPRO 100 [IU]/ML
1-6 INJECTION, SOLUTION INTRAVENOUS; SUBCUTANEOUS EVERY 6 HOURS
Status: DISCONTINUED | OUTPATIENT
Start: 2023-04-04 | End: 2023-04-05 | Stop reason: HOSPADM

## 2023-04-04 RX ORDER — ACETAMINOPHEN 500 MG
1000 TABLET ORAL EVERY 6 HOURS PRN
Status: DISCONTINUED | OUTPATIENT
Start: 2023-04-09 | End: 2023-04-04

## 2023-04-04 RX ORDER — ONDANSETRON 4 MG/1
4 TABLET, ORALLY DISINTEGRATING ORAL EVERY 4 HOURS PRN
Status: DISCONTINUED | OUTPATIENT
Start: 2023-04-04 | End: 2023-04-05 | Stop reason: HOSPADM

## 2023-04-04 RX ORDER — LOSARTAN POTASSIUM AND HYDROCHLOROTHIAZIDE 12.5; 1 MG/1; MG/1
1 TABLET ORAL EVERY EVENING
Status: DISCONTINUED | OUTPATIENT
Start: 2023-04-04 | End: 2023-04-04

## 2023-04-04 RX ORDER — HYDROMORPHONE HYDROCHLORIDE 1 MG/ML
0.25 INJECTION, SOLUTION INTRAMUSCULAR; INTRAVENOUS; SUBCUTANEOUS
Status: DISCONTINUED | OUTPATIENT
Start: 2023-04-04 | End: 2023-04-05 | Stop reason: HOSPADM

## 2023-04-04 RX ORDER — POTASSIUM CHLORIDE 7.45 MG/ML
10 INJECTION INTRAVENOUS ONCE
Status: COMPLETED | OUTPATIENT
Start: 2023-04-04 | End: 2023-04-04

## 2023-04-04 RX ORDER — POLYETHYLENE GLYCOL 3350 17 G/17G
1 POWDER, FOR SOLUTION ORAL
Status: DISCONTINUED | OUTPATIENT
Start: 2023-04-04 | End: 2023-04-05 | Stop reason: HOSPADM

## 2023-04-04 RX ORDER — OXYCODONE HYDROCHLORIDE 5 MG/1
5 TABLET ORAL
Status: DISCONTINUED | OUTPATIENT
Start: 2023-04-04 | End: 2023-04-05 | Stop reason: HOSPADM

## 2023-04-04 RX ORDER — LOSARTAN POTASSIUM 25 MG/1
100 TABLET ORAL EVERY EVENING
Status: DISCONTINUED | OUTPATIENT
Start: 2023-04-04 | End: 2023-04-05 | Stop reason: HOSPADM

## 2023-04-04 RX ORDER — MORPHINE SULFATE 4 MG/ML
4 INJECTION INTRAVENOUS ONCE
Status: COMPLETED | OUTPATIENT
Start: 2023-04-04 | End: 2023-04-04

## 2023-04-04 RX ORDER — GEMFIBROZIL 600 MG/1
600 TABLET, FILM COATED ORAL
Status: DISCONTINUED | OUTPATIENT
Start: 2023-04-04 | End: 2023-04-05 | Stop reason: HOSPADM

## 2023-04-04 RX ORDER — ONDANSETRON 2 MG/ML
4 INJECTION INTRAMUSCULAR; INTRAVENOUS ONCE
Status: COMPLETED | OUTPATIENT
Start: 2023-04-04 | End: 2023-04-04

## 2023-04-04 RX ORDER — BISACODYL 10 MG
10 SUPPOSITORY, RECTAL RECTAL
Status: DISCONTINUED | OUTPATIENT
Start: 2023-04-04 | End: 2023-04-05 | Stop reason: HOSPADM

## 2023-04-04 RX ORDER — ACETAMINOPHEN 500 MG
1000 TABLET ORAL EVERY 6 HOURS
Status: DISCONTINUED | OUTPATIENT
Start: 2023-04-04 | End: 2023-04-04

## 2023-04-04 RX ORDER — SODIUM CHLORIDE 9 MG/ML
1000 INJECTION, SOLUTION INTRAVENOUS ONCE
Status: COMPLETED | OUTPATIENT
Start: 2023-04-04 | End: 2023-04-04

## 2023-04-04 RX ADMIN — POTASSIUM CHLORIDE 10 MEQ: 7.46 INJECTION, SOLUTION INTRAVENOUS at 06:29

## 2023-04-04 RX ADMIN — IOHEXOL 100 ML: 350 INJECTION, SOLUTION INTRAVENOUS at 09:37

## 2023-04-04 RX ADMIN — SODIUM CHLORIDE 1000 ML: 9 INJECTION, SOLUTION INTRAVENOUS at 06:19

## 2023-04-04 RX ADMIN — ACETAMINOPHEN 650 MG: 325 TABLET, FILM COATED ORAL at 14:38

## 2023-04-04 RX ADMIN — GEMFIBROZIL 600 MG: 600 TABLET ORAL at 17:27

## 2023-04-04 RX ADMIN — SODIUM CHLORIDE, POTASSIUM CHLORIDE, SODIUM LACTATE AND CALCIUM CHLORIDE: 600; 310; 30; 20 INJECTION, SOLUTION INTRAVENOUS at 10:16

## 2023-04-04 RX ADMIN — LOSARTAN POTASSIUM 100 MG: 25 TABLET, FILM COATED ORAL at 17:27

## 2023-04-04 RX ADMIN — MORPHINE SULFATE 4 MG: 4 INJECTION INTRAVENOUS at 05:48

## 2023-04-04 RX ADMIN — SODIUM CHLORIDE, POTASSIUM CHLORIDE, SODIUM LACTATE AND CALCIUM CHLORIDE: 600; 310; 30; 20 INJECTION, SOLUTION INTRAVENOUS at 22:39

## 2023-04-04 RX ADMIN — SODIUM CHLORIDE, POTASSIUM CHLORIDE, SODIUM LACTATE AND CALCIUM CHLORIDE: 600; 310; 30; 20 INJECTION, SOLUTION INTRAVENOUS at 14:27

## 2023-04-04 RX ADMIN — ONDANSETRON 4 MG: 2 INJECTION INTRAMUSCULAR; INTRAVENOUS at 05:49

## 2023-04-04 RX ADMIN — HYDROCHLOROTHIAZIDE 12.5 MG: 12.5 TABLET ORAL at 17:27

## 2023-04-04 ASSESSMENT — LIFESTYLE VARIABLES
EVER FELT BAD OR GUILTY ABOUT YOUR DRINKING: NO
HOW MANY TIMES IN THE PAST YEAR HAVE YOU HAD 5 OR MORE DRINKS IN A DAY: 0
CONSUMPTION TOTAL: NEGATIVE
EVER HAD A DRINK FIRST THING IN THE MORNING TO STEADY YOUR NERVES TO GET RID OF A HANGOVER: NO
HAVE PEOPLE ANNOYED YOU BY CRITICIZING YOUR DRINKING: NO
TOTAL SCORE: 0
ALCOHOL_USE: YES
TOTAL SCORE: 0
AVERAGE NUMBER OF DAYS PER WEEK YOU HAVE A DRINK CONTAINING ALCOHOL: 0
ON A TYPICAL DAY WHEN YOU DRINK ALCOHOL HOW MANY DRINKS DO YOU HAVE: 1
TOTAL SCORE: 0
HAVE YOU EVER FELT YOU SHOULD CUT DOWN ON YOUR DRINKING: NO

## 2023-04-04 ASSESSMENT — ENCOUNTER SYMPTOMS
HEADACHES: 0
FEVER: 0
WHEEZING: 0
NAUSEA: 1
EYE PAIN: 0
DIARRHEA: 1
MYALGIAS: 0
CHILLS: 1
SHORTNESS OF BREATH: 0
ABDOMINAL PAIN: 1
WEAKNESS: 1
VOMITING: 1
DIZZINESS: 0
INSOMNIA: 0
DEPRESSION: 0
PALPITATIONS: 0
COUGH: 0
SORE THROAT: 0
FLANK PAIN: 0

## 2023-04-04 ASSESSMENT — PATIENT HEALTH QUESTIONNAIRE - PHQ9
1. LITTLE INTEREST OR PLEASURE IN DOING THINGS: NOT AT ALL
2. FEELING DOWN, DEPRESSED, IRRITABLE, OR HOPELESS: NOT AT ALL
SUM OF ALL RESPONSES TO PHQ9 QUESTIONS 1 AND 2: 0

## 2023-04-04 ASSESSMENT — COGNITIVE AND FUNCTIONAL STATUS - GENERAL
DAILY ACTIVITIY SCORE: 24
MOBILITY SCORE: 24
SUGGESTED CMS G CODE MODIFIER MOBILITY: CH
SUGGESTED CMS G CODE MODIFIER DAILY ACTIVITY: CH

## 2023-04-04 ASSESSMENT — FIBROSIS 4 INDEX
FIB4 SCORE: 0.38
FIB4 SCORE: 0.38
FIB4 SCORE: 0.37

## 2023-04-04 ASSESSMENT — PAIN DESCRIPTION - PAIN TYPE
TYPE: ACUTE PAIN

## 2023-04-04 NOTE — H&P
[facility] History & Physical Note    Date of Service  4/4/2023    Primary Care Physician  Eleuterio Romeo M.D.    Consultants  None    Specialist Names: N/A    Code Status  Full Code    Chief Complaint  Chief Complaint   Patient presents with    Abdominal Pain    N/V    Chills    Diarrhea     Pt reports epigastric pain associated with N/V/D and chills started Sunday morning. Patient also states minimal bleeding after urination but no pain. Pt took Advil with no relief. Hx.of UTI years ago.     Weakness     History of Presenting Illness  Ita Johnson is a 47 y.o. female with past medical history of diabetes type 2 (not on insulin), dyslipidemia, hypertension, and vitamin D deficiency who presented to the ED with chief complaint of nausea, vomiting, and epigastric discomfort.    Onset of symptoms Sunday, 4/2.  Reported generalized abdominal discomfort which is worse in epigastric region.  Pain described as pressure/sharp and woke her from sleep.  Associated diaphoresis, chills, nausea, vomiting x 1, and diarrhea (multiple episodes).  No nausea or vomiting yesterday or today.  Denies hematemesis, coffee ground emesis, and melena.  Reported blood streak on toilet paper when wiping.  Pain mildly relieved after bowel movement and Advil.  Mildly worsened with intake of beets, but not broth.  Denies prior similar symptoms.  Endorsing weakness, urinary frequency, and hematuria.  Denies fevers, dysuria, shortness of breath, and chest pain.  No known sick contacts.  Denies prior abdominal surgery.  Has intact gallbladder.  Reports rare alcohol use.  Last ETOH intake 3/25 and had 1 glass of wine.      Patient is a type II diabetic and is on metformin and empagliflozin outpatient.  Followed by primary care provider, Dr Romeo, whom she has a scheduled appointment with on April 10.      In the ED CBC remarkable for RBC 5.91, hemoglobin 16.6, hematocrit 50.8, MCHC 32.7, and MPV 8.5.  CMP remarkable for potassium 3.2,  glucose 183, globulin 3.6.  Mag 2.1.  Lipase 2238.  Beta-hCG negative.  UA showed >1000 glucose, large occult blood, moderate epithelial cells, and few bacteria.  Lipid panel remarkable for triglycerides 197.  Received 1 L normal saline bolus, 4 mg morphine, 4 mg Zofran, and 10 mEq potassium chloride.  Admitted to observation for acute pancreatitis.    I discussed the plan of care with patient, bedside RN, , and collaborating physician, Dr Mustafa .    Review of Systems  Review of Systems   Constitutional:  Positive for chills and malaise/fatigue. Negative for fever.   HENT:  Negative for congestion and sore throat.    Eyes:  Negative for pain.   Respiratory:  Negative for cough, shortness of breath and wheezing.    Cardiovascular:  Negative for chest pain, palpitations and leg swelling.   Gastrointestinal:  Positive for abdominal pain, diarrhea, nausea and vomiting.   Genitourinary:  Positive for frequency and hematuria. Negative for dysuria, flank pain and urgency.   Musculoskeletal:  Negative for myalgias.   Skin:  Negative for rash.   Neurological:  Positive for weakness. Negative for dizziness and headaches.   Psychiatric/Behavioral:  Negative for depression. The patient does not have insomnia.      Past Medical History   has a past medical history of DM type 2 (diabetes mellitus, type 2) ([facility]), Dyslipidemia, HTN (hypertension), and Vitamin D deficiency.    Surgical History   has no past surgical history on file.     Family History  family history includes Arthritis in her sister; Diabetes in her brother, father, and sister; Heart Failure in her father; Hyperlipidemia in her father and sister; Hypertension in her brother, father, mother, and sister; Prostate cancer in her father; Stroke in her mother.   Family history reviewed with patient. There is no family history that is pertinent to the chief complaint.     Social History   reports that she has been smoking cigarettes. She has a 30.00 pack-year  smoking history. She has never used smokeless tobacco. She reports current alcohol use. She reports that she does not use drugs.    Allergies  No Known Allergies    Medications  Prior to Admission Medications   Prescriptions Last Dose Informant Patient Reported? Taking?   Ascorbic Acid (VITAMIN C PO) 4/3/2023 at 0800 Patient Yes Yes   Sig: Take 1 Tablet by mouth every day.   BIOTIN PO 4/3/2023 at 0800 Patient Yes Yes   Sig: Take 1 Tablet by mouth every day.   Cholecalciferol (D3 PO) 4/3/2023 at 0800 Patient Yes Yes   Sig: Take 2 Capsules by mouth every day. Pt is not sure the strength (OTC)   Cyanocobalamin (B-12 PO) 4/3/2023 at 0800 Patient Yes Yes   Sig: Take 1 Tablet by mouth every day.   DM-Doxylamine-Acetaminophen (QC NIGHTTIME COLD & FLU PO) 4/3/2023 at 1930 Patient Yes Yes   Sig: Take 30 mL by mouth at bedtime as needed (For cold symptoms).   DM-Phenylephrine-Acetaminophen (QC DAYTIME COLD/FLU PO) 4/3/2023 at 1000 Patient Yes Yes   Sig: Take 30 mL by mouth 2 times a day as needed (For cold symptoms).   Empagliflozin 25 MG Tab 4/3/2023 at 0800 Patient No No   Sig: Take 1 Tablet by mouth every day.   Omega-3 Fatty Acids (FISH OIL PO) 4/3/2023 at 0800 Patient Yes Yes   Sig: Take 2 Capsules by mouth every day.   gemfibrozil (LOPID) 600 MG Tab 4/3/2023 at 1900 Patient No No   Sig: Take 1 Tablet by mouth 2 times a day.   ibuprofen (MOTRIN) 200 MG Tab 4/4/2023 at 0415 Patient Yes Yes   Sig: Take 400 mg by mouth every 6 hours as needed. Indications: Pain   losartan-hydrochlorothiazide (HYZAAR) 100-12.5 MG per tablet 4/3/2023 at 1900 Patient No No   Sig: Take 1 Tablet by mouth every day.   Patient taking differently: Take 1 Tablet by mouth every evening.   metFORMIN ER (GLUCOPHAGE XR) 500 MG TABLET SR 24 HR 4/3/2023 at 1900 Patient No No   Sig: TAKE 2 TABLETS BY MOUTH TWICE DAILY   Patient taking differently: Take 1,000 mg by mouth 2 times a day.   vitamin D2, Ergocalciferol, (DRISDOL) 1.25 MG (65960 UT) Cap capsule  4/2/2023 at AM Patient No No   Sig: Take 1 Capsule by mouth every 7 days.   Patient taking differently: Take 50,000 Units by mouth every 7 days. On Sunday      Facility-Administered Medications: None     Physical Exam  Temp:  [36.5 °C (97.7 °F)] 36.5 °C (97.7 °F)  Pulse:  [62-78] 78  Resp:  [16-18] 16  BP: (119-140)/(61-77) 119/61  SpO2:  [95 %-99 %] 95 %  Blood Pressure: 119/61   Temperature: 36.5 °C (97.7 °F)   Pulse: 78   Respiration: 16   Pulse Oximetry: 95 %     Physical Exam  Vitals and nursing note reviewed.   Constitutional:       General: She is not in acute distress.     Appearance: Normal appearance. She is obese. She is ill-appearing. She is not toxic-appearing.   HENT:      Head: Normocephalic and atraumatic.      Right Ear: External ear normal.      Left Ear: External ear normal.      Nose: Nose normal.      Mouth/Throat:      Mouth: Mucous membranes are moist.      Pharynx: Oropharynx is clear.   Eyes:      General: No scleral icterus.     Conjunctiva/sclera: Conjunctivae normal.   Cardiovascular:      Rate and Rhythm: Normal rate and regular rhythm.      Pulses: Normal pulses.      Heart sounds: No murmur heard.  Pulmonary:      Effort: Pulmonary effort is normal. No respiratory distress.      Breath sounds: Normal breath sounds.   Abdominal:      General: Abdomen is protuberant. Bowel sounds are normal. There is no distension.      Palpations: Abdomen is soft.      Tenderness: There is abdominal tenderness in the epigastric area. There is guarding. There is no rebound. Negative signs include Lechuga's sign.   Musculoskeletal:         General: Normal range of motion.      Cervical back: Normal range of motion.      Right lower leg: No edema.      Left lower leg: No edema.   Skin:     General: Skin is warm and dry.      Coloration: Skin is not jaundiced.   Neurological:      General: No focal deficit present.      Mental Status: She is alert and oriented to person, place, and time. Mental status is at  baseline.   Psychiatric:         Mood and Affect: Mood normal.         Behavior: Behavior normal.         Thought Content: Thought content normal.         Judgment: Judgment normal.     Laboratory:  Recent Labs     04/04/23  0537   WBC 9.6   RBC 5.91*   HEMOGLOBIN 16.6*   HEMATOCRIT 50.8*   MCV 86.0   MCH 28.1   MCHC 32.7*   RDW 47.9   PLATELETCT 444   MPV 8.5*     Recent Labs     04/04/23  0537   SODIUM 138   POTASSIUM 3.2*   CHLORIDE 101   CO2 24   GLUCOSE 183*   BUN 19   CREATININE 0.55   CALCIUM 9.2     Recent Labs     04/04/23  0537   ALTSGPT 19   ASTSGOT 16   ALKPHOSPHAT 78   TBILIRUBIN 0.2   LIPASE 2238*   GLUCOSE 183*       No results for input(s): NTPROBNP in the last 72 hours.  Recent Labs     04/04/23  0537   TRIGLYCERIDE 197*     No results for input(s): TROPONINT in the last 72 hours.    Imaging:  CT-ABDOMEN-PELVIS WITH    (Results Pending)     no X-Ray or EKG requiring interpretation    Assessment/Plan:  Justification for Admission Status  I anticipate this patient is appropriate for observation status at this time because acute pancreatitis    Patient will need a Med/Surg bed on MEDICAL service .  The need is secondary to acute pancreatitis.    * Drug induced acute pancreatitis without necrosis or infection- (present on admission)  Assessment & Plan  -Epigastric pain with lipase of 2238.  -Suspicion for empagliflozin induced pancreatitis.   -Recommend patient discuss continuation of empagliflozin with PCP, Dr Romeo.    -Reports rare alcohol use.     -Last ETOH intake 3/25 and had 1 glass of wine.   -Lipid panel remarkable for triglycerides 197.   -Beta HcG negative.  -Pending A/P with for further evaluation.  -Ordered LR IVF, PRN pain medication, and PRN anti-nausea medications.  -NPO with ice chips.     -Advance diet as tolerated.    Hypokalemia  Assessment & Plan  -Mild.  -Suspect secondary to nausea and vomiting.  -Given 10 mEq potassium chloride in the ED.  -Monitor.    Polycythemia  Assessment &  Plan  -Suspect secondary to dehydration due to nausea and vomiting.  -Ordered IVF fluids.    -Monitor.      Hyperlipidemia  Assessment & Plan  -Lipid panel remarkable for triglycerides 197.   -Continue outpatient Lopid.      HTN (hypertension)- (present on admission)  Assessment & Plan  -Stable.  -Continue outpatient losartan-hydrochlorothiazide 100-12.5 mg.      Type 2 diabetes mellitus with hyperglycemia (HCC)- (present on admission)  Assessment & Plan  -Hemoglobin A1c 7.5 on 11/3/22.    -Hold outpatient empagliflozin and metformin.     -Suspicion for empagliflozin induced pancreatitis.   -Recommend patient discuss continuation of empagliflozin with PCP, Dr. Romeo.    -Ordered Lantus 10 units nightly and NPO sliding scale insulin with hypoglycemic protocol.  -Diabetic diet when able to tolerate oral intake.    VTE prophylaxis: enoxaparin ppx    My total time spent caring for the patient on the day of the encounter was 31 minutes.   This does not include time spent on separately billable procedures/tests. This time is exclusive of the time spent with collaborating physicians.

## 2023-04-04 NOTE — ED PROVIDER NOTES
ED Provider Note    CHIEF COMPLAINT  Chief Complaint   Patient presents with    Abdominal Pain    N/V    Chills    Diarrhea     Pt reports epigastric pain associated with N/V/D and chills started Sunday morning. Patient also states minimal bleeding after urination but no pain. Pt took Advil with no relief. Hx.of UTI years ago.     Weakness       EXTERNAL RECORDS REVIEWED  Office visit patient has a history of hyperlipidemia as well as diabetes    HPI/ROS  LIMITATION TO HISTORY   Select: : None  OUTSIDE HISTORIAN(S):  None    Ita Johnson is a 47 y.o. female who presents evaluation of generalized abdominal pain that is worse in her epigastric region.  Woke her up from sleep this evening.  Pain described as an achy sharp sensation in her entire abdomen.  Stated she has had some nausea had an episode of vomiting 2 days ago and episode of diarrhea 2 days ago though no vomiting or diarrhea this evening.  Does admit to some increased urinary frequency and hematuria.  Denies any fevers chest pain shortness of breath    PAST MEDICAL HISTORY   has a past medical history of DM type 2 (diabetes mellitus, type 2) (HCC), Dyslipidemia, HTN (hypertension), and Vitamin D deficiency.    SURGICAL HISTORY  patient denies any surgical history    FAMILY HISTORY  Family History   Problem Relation Age of Onset    Stroke Mother     Hypertension Mother     Diabetes Father     Hypertension Father     Prostate cancer Father         with mets    Hyperlipidemia Father     Heart Failure Father     Hypertension Sister     Diabetes Sister     Hyperlipidemia Sister     Arthritis Sister         autoimmune    Diabetes Brother     Hypertension Brother        SOCIAL HISTORY  Social History     Tobacco Use    Smoking status: Every Day     Packs/day: 1.00     Years: 30.00     Pack years: 30.00     Types: Cigarettes    Smokeless tobacco: Never   Vaping Use    Vaping Use: Never used   Substance and Sexual Activity    Alcohol use: Yes     Alcohol/week: 0.0 oz  "    Comment: occasional    Drug use: No    Sexual activity: Yes     Partners: Male       CURRENT MEDICATIONS  Home Medications       Reviewed by Gerda Birmingham R.N. (Registered Nurse) on 04/04/23 at 0523  Med List Status: Partial     Medication Last Dose Status   Empagliflozin 25 MG Tab  Active   gemfibrozil (LOPID) 600 MG Tab  Active   losartan-hydrochlorothiazide (HYZAAR) 100-12.5 MG per tablet  Active   metFORMIN ER (GLUCOPHAGE XR) 500 MG TABLET SR 24 HR  Active   vitamin D2, Ergocalciferol, (DRISDOL) 1.25 MG (49288 UT) Cap capsule  Active                    ALLERGIES  No Known Allergies    PHYSICAL EXAM  VITAL SIGNS: /77   Pulse 76   Temp 36.5 °C (97.7 °F) (Temporal)   Resp 18   Ht 1.626 m (5' 4\")   Wt 67.6 kg (149 lb)   LMP 03/25/2023 (Exact Date)   SpO2 96%   BMI 25.58 kg/m²    Pulse ox interpretation: I interpret this pulse ox as normal.  VITALS - vital signs documented prior to this note have been reviewed and noted,  GENERAL - awake, alert, oriented, GCS 15, no apparent distress, non-toxic  appearing  HEENT - normocephalic, atraumatic, pupils equal, sclera anicteric, mucus  membranes moist  NECK - supple, no meningismus, full active range of motion, trachea midline  CARDIOVASCULAR - regular rate/rhythm, no murmurs/gallops/rubs  PULMONARY - no respiratory distress, speaking in full sentences, clear to  auscultation bilaterally, no wheezing/ronchi/rales, no accessory muscle use  GASTROINTESTINAL -she has diffuse voluntary guarding and moderate diffuse tenderness no specific point tenderness negative McBurney's point and negative Lechuga sign  GENITOURINARY - Deferred  NEUROLOGIC - Awake alert, normal mental status, speech fluid, cognition  normal, moves all extremities  MUSCULOSKELETAL - no obvious asymmetry or deformities present  EXTREMITIES - warm, well-perfused, no cyanosis or significant edema  DERMATOLOGIC - warm, dry, no rashes, no jaundice  PSYCHIATRIC - normal affect, normal " insight, normal concentration      DIAGNOSTIC STUDIES / PROCEDURES    LABS  Labs Reviewed   CBC WITH DIFFERENTIAL - Abnormal; Notable for the following components:       Result Value    RBC 5.91 (*)     Hemoglobin 16.6 (*)     Hematocrit 50.8 (*)     MCHC 32.7 (*)     MPV 8.5 (*)     All other components within normal limits   COMP METABOLIC PANEL - Abnormal; Notable for the following components:    Potassium 3.2 (*)     Glucose 183 (*)     Globulin 3.6 (*)     All other components within normal limits   LIPASE - Abnormal; Notable for the following components:    Lipase 2238 (*)     All other components within normal limits   HCG QUAL SERUM   CORRECTED CALCIUM   ESTIMATED GFR   URINALYSIS   TRIGLYCERIDE        RADIOLOGY  I have independently interpreted the diagnostic imaging associated with this visit and am waiting the final reading from the radiologist.   My preliminary interpretation is as follows: Point-of-care bedside ultrasound did not show any shadowing gallstones pericholecystic fluid or thickened gallbladder.  Did not appear to be any dilation of the common bile duct, she had negative negative right upper quadrant ultrasound.  This was a negative point-of-care gallbladder ultrasound     COURSE & MEDICAL DECISION MAKING    ED Observation Status? No      INITIAL ASSESSMENT, COURSE AND PLAN    Care Narrative: Patient presented for evaluation of generalized abdominal pain with associated nausea vomiting and diarrhea.  Differential include was not limited to pancreatitis gastritis colitis dehydration electrolyte abnormality among many other considerations.  Labs are ordered to evaluate for above.  She was also given a dose of morphine and Zofran for pain and her nausea  Review of records reveal the patient does have a history of hypertriglyceridemia.  Her lipase is elevated raising concern for possible hypertriglyceride induced pancreatitis thus triglycerides were added. . Bedside ultrasound shows no evidence  of gallstones. She is hypokalemic given that she may need an insulin drip, her potassium was repleated. She will require admission to the hospital for further care and evaluation of her acute pancreatitis given her abdominal pain nausea vomiting.  Though given that her triglycerides are pending she may require ICU admission for possible insulin drip was signed out to Dr. Jung to follow-up with this and signed appropriate for disposition upon resulting of her triglycerides.          HYDRATION: Based on the patient's presentation of Other pancreatitis nausea vomiting and dehydration the patient was given IV fluids. IV Hydration was used because oral hydration was not adequate alone. Upon recheck following hydration, the patient was improved.      ADDITIONAL PROBLEM LIST  #1 nausea vomiting  DISPOSITION AND DISCUSSIONS  I have discussed management of the patient with the following physicians and TORIE's:  none    Discussion of management with other Q or appropriate source(s): None     Escalation of care considered, and ultimately not performed:IV fluids    Barriers to care at this time, including but not limited to:  none .     Decision tools and prescription drugs considered including, but not limited to: antiemetics.    FINAL DIAGNOSIS  1. Acute pancreatitis, unspecified complication status, unspecified pancreatitis type    2. Dehydration    3. Nausea and vomiting, unspecified vomiting type           Electronically signed by: Lyndon Benton D.O., 4/4/2023 6:10 AM

## 2023-04-04 NOTE — ED NOTES
Med rec updated and complete, per pt   Allergies reviewed, per pt  Pt reports that she takes her D2 50,000 units on Sunday and 2 capsule of VITAMIN D3 every day, not sure the strength.  I informed the doctor

## 2023-04-04 NOTE — PROGRESS NOTES
0972-Received report from ER RN.   1000-Pt arrived to floor via wheelchair then ambulated to hospital bed..  Assumed care of pt.   Assessment and chart check complete.   Pt is AAOX4, no signs of distress, denies nausea/vomiting.   Updated for the POC of the day.  IVF infusing.   Fall precautions in place, treaded socks on pt, bed in low position.   Call light within reach.   Educated pt to call if needing anything.   Hourly rounding.

## 2023-04-04 NOTE — ED TRIAGE NOTES
"Chief Complaint   Patient presents with    Abdominal Pain    N/V    Chills    Diarrhea     Pt reports epigastric pain associated with N/V/D and chills started Sunday morning. Patient also states minimal bleeding after urination but no pain. Pt took Advil with no relief. Hx.of UTI years ago.     Weakness     BP (!) 140/64   Pulse 62   Temp (!) 35.8 °C (96.4 °F) (Temporal)   Resp 18   Ht 1.626 m (5' 4\")   Wt 67.6 kg (149 lb)   LMP 03/25/2023 (Exact Date)   SpO2 99%   BMI 25.58 kg/m²   Pt alert, oriented and ambulatory.   "

## 2023-04-04 NOTE — ASSESSMENT & PLAN NOTE
-Hemoglobin A1c 7.5 on 11/3/22.    -Hold outpatient empagliflozin and metformin.     -Suspicion for empagliflozin induced pancreatitis.   -Recommend patient discuss continuation of empagliflozin with PCP, Dr. Romeo.    -Ordered Lantus 10 units nightly and NPO sliding scale insulin with hypoglycemic protocol.  -Diabetic diet when able to tolerate oral intake.

## 2023-04-04 NOTE — ED PROVIDER NOTES
ED Provider Note  Care assumed from Dr. Sterling Loyola at 6 AM.  Remaining laboratory studies pending.  Triglycerides is minimally elevated.  Overall clinical presentation laboratories are consistent with acute pancreatitis.    Results for orders placed or performed during the hospital encounter of 04/04/23   CBC WITH DIFFERENTIAL   Result Value Ref Range    WBC 9.6 4.8 - 10.8 K/uL    RBC 5.91 (H) 4.20 - 5.40 M/uL    Hemoglobin 16.6 (H) 12.0 - 16.0 g/dL    Hematocrit 50.8 (H) 37.0 - 47.0 %    MCV 86.0 81.4 - 97.8 fL    MCH 28.1 27.0 - 33.0 pg    MCHC 32.7 (L) 33.6 - 35.0 g/dL    RDW 47.9 35.9 - 50.0 fL    Platelet Count 444 164 - 446 K/uL    MPV 8.5 (L) 9.0 - 12.9 fL    Neutrophils-Polys 52.40 44.00 - 72.00 %    Lymphocytes 37.90 22.00 - 41.00 %    Monocytes 5.90 0.00 - 13.40 %    Eosinophils 3.10 0.00 - 6.90 %    Basophils 0.20 0.00 - 1.80 %    Immature Granulocytes 0.50 0.00 - 0.90 %    Nucleated RBC 0.00 /100 WBC    Neutrophils (Absolute) 5.00 2.00 - 7.15 K/uL    Lymphs (Absolute) 3.62 1.00 - 4.80 K/uL    Monos (Absolute) 0.56 0.00 - 0.85 K/uL    Eos (Absolute) 0.30 0.00 - 0.51 K/uL    Baso (Absolute) 0.02 0.00 - 0.12 K/uL    Immature Granulocytes (abs) 0.05 0.00 - 0.11 K/uL    NRBC (Absolute) 0.00 K/uL   COMP METABOLIC PANEL   Result Value Ref Range    Sodium 138 135 - 145 mmol/L    Potassium 3.2 (L) 3.6 - 5.5 mmol/L    Chloride 101 96 - 112 mmol/L    Co2 24 20 - 33 mmol/L    Anion Gap 13.0 7.0 - 16.0    Glucose 183 (H) 65 - 99 mg/dL    Bun 19 8 - 22 mg/dL    Creatinine 0.55 0.50 - 1.40 mg/dL    Calcium 9.2 8.4 - 10.2 mg/dL    AST(SGOT) 16 12 - 45 U/L    ALT(SGPT) 19 2 - 50 U/L    Alkaline Phosphatase 78 30 - 99 U/L    Total Bilirubin 0.2 0.1 - 1.5 mg/dL    Albumin 4.4 3.2 - 4.9 g/dL    Total Protein 8.0 6.0 - 8.2 g/dL    Globulin 3.6 (H) 1.9 - 3.5 g/dL    A-G Ratio 1.2 g/dL   LIPASE   Result Value Ref Range    Lipase 2238 (H) 7 - 58 U/L   HCG QUAL SERUM   Result Value Ref Range    Beta-Hcg Qualitative Serum  Negative Negative   CORRECTED CALCIUM   Result Value Ref Range    Correct Calcium 8.9 8.5 - 10.5 mg/dL   ESTIMATED GFR   Result Value Ref Range    GFR (CKD-EPI) 114 >60 mL/min/1.73 m 2   Triglyceride   Result Value Ref Range    Triglycerides 197 (H) 0 - 149 mg/dL     7:11 AM patient will be admitted for further evaluation and treatment.  I paged the hospitalist for admission.        FINAL DIAGNOSIS  1. Acute pancreatitis, unspecified complication status, unspecified pancreatitis type    2. Dehydration    3. Nausea and vomiting, unspecified vomiting type           Electronically signed by: Jett Jung M.D., 4/4/2023 7:10 AM

## 2023-04-04 NOTE — ASSESSMENT & PLAN NOTE
-Epigastric pain with lipase of 2238.  -Suspicion for empagliflozin induced pancreatitis.   -Recommend patient discuss continuation of empagliflozin with PCP, Dr Romeo.    -Reports rare alcohol use.     -Last ETOH intake 3/25 and had 1 glass of wine.   -Lipid panel remarkable for triglycerides 197.   -Beta HcG negative.  -Pending A/P with for further evaluation.  -Ordered LR IVF, PRN pain medication, and PRN anti-nausea medications.  -NPO with ice chips.     -Advance diet as tolerated.

## 2023-04-04 NOTE — HOSPITAL COURSE
Ita Johnson is a 47 y.o. female with past medical history of diabetes type 2 (not on insulin), dyslipidemia, hypertension, and vitamin D deficiency who presented to the ED with chief complaint of nausea, vomiting, and epigastric discomfort.    Onset of symptoms Sunday, 4/2.  Reported generalized abdominal discomfort which is worse in epigastric region.  Pain described as pressure/sharp and woke her from sleep.  Associated diaphoresis, chills, nausea, vomiting x 1, and diarrhea (multiple episodes).  No nausea or vomiting yesterday or today.  Denies hematemesis, coffee ground emesis, and melena.  Reported blood streak on toilet paper when wiping.  Pain mildly relieved after bowel movement and Advil.  Mildly worsened with intake of beets, but not broth.  Denies prior similar symptoms.  Endorsing weakness, urinary frequency, and hematuria.  Denies fevers, dysuria, shortness of breath, and chest pain.  No known sick contacts.  Denies prior abdominal surgery.  Has intact gallbladder.  Reports rare alcohol use.  Last ETOH intake 3/25 and had 1 glass of wine.      Patient is a type II diabetic and is on metformin and empagliflozin outpatient.  Followed by primary care provider, Dr Romeo, whom she has a scheduled appointment with on April 10.      In the ED CBC remarkable for RBC 5.91, hemoglobin 16.6, hematocrit 50.8, MCHC 32.7, and MPV 8.5.  CMP remarkable for potassium 3.2, glucose 183, globulin 3.6.  Mag 2.1.  Lipase 2238.  Beta-hCG negative.  UA showed >1000 glucose, large occult blood, moderate epithelial cells, and few bacteria.  Lipid panel remarkable for triglycerides 197.  Received 1 L normal saline bolus, 4 mg morphine, 4 mg Zofran, and 10 mEq potassium chloride.  Admitted to observation for acute pancreatitis.

## 2023-04-04 NOTE — PROGRESS NOTES
4 Eyes Skin Assessment Completed by MARCOS Bean and MARCOS Avila.    Head WDL  Ears WDL  Nose WDL  Mouth WDL  Neck WDL  Breast/Chest WDL  Shoulder Blades WDL  Spine WDL  (R) Arm/Elbow/Hand Scab  (L) Arm/Elbow/Hand WDL  Abdomen WDL  Groin WDL  Scrotum/Coccyx/Buttocks WDL  (R) Leg WDL  (L) Leg WDL  (R) Heel/Foot/Toe WDL  (L) Heel/Foot/Toe WDL          Devices In Places Blood Pressure Cuff and Pulse Ox      Interventions In Place Pillows    Possible Skin Injury No    Pictures Uploaded Into Epic N/A  Wound Consult Placed N/A  RN Wound Prevention Protocol Ordered No

## 2023-04-04 NOTE — ASSESSMENT & PLAN NOTE
-Mild.  -Suspect secondary to nausea and vomiting.  -Given 10 mEq potassium chloride in the ED.  -Monitor.

## 2023-04-04 NOTE — ASSESSMENT & PLAN NOTE
-Suspect secondary to dehydration due to nausea and vomiting.  -Ordered IVF fluids.    -Monitor.

## 2023-04-05 VITALS
WEIGHT: 139.11 LBS | SYSTOLIC BLOOD PRESSURE: 120 MMHG | OXYGEN SATURATION: 96 % | HEART RATE: 68 BPM | HEIGHT: 64 IN | TEMPERATURE: 98.9 F | DIASTOLIC BLOOD PRESSURE: 72 MMHG | RESPIRATION RATE: 18 BRPM | BODY MASS INDEX: 23.75 KG/M2

## 2023-04-05 PROBLEM — D21.9 MYOMA: Status: ACTIVE | Noted: 2023-04-05

## 2023-04-05 LAB
ANION GAP SERPL CALC-SCNC: 9 MMOL/L (ref 7–16)
BUN SERPL-MCNC: 10 MG/DL (ref 8–22)
CALCIUM SERPL-MCNC: 8.8 MG/DL (ref 8.4–10.2)
CHLORIDE SERPL-SCNC: 107 MMOL/L (ref 96–112)
CO2 SERPL-SCNC: 26 MMOL/L (ref 20–33)
CREAT SERPL-MCNC: 0.37 MG/DL (ref 0.5–1.4)
ERYTHROCYTE [DISTWIDTH] IN BLOOD BY AUTOMATED COUNT: 47.4 FL (ref 35.9–50)
GFR SERPLBLD CREATININE-BSD FMLA CKD-EPI: 125 ML/MIN/1.73 M 2
GLUCOSE BLD STRIP.AUTO-MCNC: 149 MG/DL (ref 65–99)
GLUCOSE BLD STRIP.AUTO-MCNC: 84 MG/DL (ref 65–99)
GLUCOSE BLD STRIP.AUTO-MCNC: 86 MG/DL (ref 65–99)
GLUCOSE SERPL-MCNC: 83 MG/DL (ref 65–99)
HCT VFR BLD AUTO: 41.3 % (ref 37–47)
HGB BLD-MCNC: 13.4 G/DL (ref 12–16)
LIPASE SERPL-CCNC: 42 U/L (ref 7–58)
MCH RBC QN AUTO: 27.9 PG (ref 27–33)
MCHC RBC AUTO-ENTMCNC: 32.4 G/DL (ref 33.6–35)
MCV RBC AUTO: 85.9 FL (ref 81.4–97.8)
PLATELET # BLD AUTO: 384 K/UL (ref 164–446)
PMV BLD AUTO: 8.7 FL (ref 9–12.9)
POTASSIUM SERPL-SCNC: 3.4 MMOL/L (ref 3.6–5.5)
RBC # BLD AUTO: 4.81 M/UL (ref 4.2–5.4)
SODIUM SERPL-SCNC: 142 MMOL/L (ref 135–145)
WBC # BLD AUTO: 8.2 K/UL (ref 4.8–10.8)

## 2023-04-05 PROCEDURE — A9270 NON-COVERED ITEM OR SERVICE: HCPCS | Performed by: INTERNAL MEDICINE

## 2023-04-05 PROCEDURE — 82962 GLUCOSE BLOOD TEST: CPT | Mod: 91

## 2023-04-05 PROCEDURE — 85027 COMPLETE CBC AUTOMATED: CPT

## 2023-04-05 PROCEDURE — 700102 HCHG RX REV CODE 250 W/ 637 OVERRIDE(OP): Performed by: NURSE PRACTITIONER

## 2023-04-05 PROCEDURE — 99239 HOSP IP/OBS DSCHRG MGMT >30: CPT | Performed by: INTERNAL MEDICINE

## 2023-04-05 PROCEDURE — 94760 N-INVAS EAR/PLS OXIMETRY 1: CPT

## 2023-04-05 PROCEDURE — 80048 BASIC METABOLIC PNL TOTAL CA: CPT

## 2023-04-05 PROCEDURE — A9270 NON-COVERED ITEM OR SERVICE: HCPCS | Performed by: NURSE PRACTITIONER

## 2023-04-05 PROCEDURE — 700105 HCHG RX REV CODE 258: Performed by: INTERNAL MEDICINE

## 2023-04-05 PROCEDURE — G0378 HOSPITAL OBSERVATION PER HR: HCPCS

## 2023-04-05 PROCEDURE — 700102 HCHG RX REV CODE 250 W/ 637 OVERRIDE(OP): Performed by: INTERNAL MEDICINE

## 2023-04-05 PROCEDURE — 36415 COLL VENOUS BLD VENIPUNCTURE: CPT

## 2023-04-05 PROCEDURE — 83690 ASSAY OF LIPASE: CPT

## 2023-04-05 RX ORDER — POTASSIUM CHLORIDE 20 MEQ/1
20 TABLET, EXTENDED RELEASE ORAL ONCE
Status: COMPLETED | OUTPATIENT
Start: 2023-04-05 | End: 2023-04-05

## 2023-04-05 RX ADMIN — SODIUM CHLORIDE, POTASSIUM CHLORIDE, SODIUM LACTATE AND CALCIUM CHLORIDE: 600; 310; 30; 20 INJECTION, SOLUTION INTRAVENOUS at 06:04

## 2023-04-05 RX ADMIN — POTASSIUM CHLORIDE 20 MEQ: 1500 TABLET, EXTENDED RELEASE ORAL at 10:14

## 2023-04-05 RX ADMIN — GEMFIBROZIL 600 MG: 600 TABLET ORAL at 06:00

## 2023-04-05 ASSESSMENT — PATIENT HEALTH QUESTIONNAIRE - PHQ9
2. FEELING DOWN, DEPRESSED, IRRITABLE, OR HOPELESS: NOT AT ALL
SUM OF ALL RESPONSES TO PHQ9 QUESTIONS 1 AND 2: 0
1. LITTLE INTEREST OR PLEASURE IN DOING THINGS: NOT AT ALL

## 2023-04-05 ASSESSMENT — PAIN DESCRIPTION - PAIN TYPE: TYPE: ACUTE PAIN

## 2023-04-05 NOTE — CARE PLAN
The patient is Stable - Low risk of patient condition declining or worsening    Shift Goals  Clinical Goals: Pain will be controlled at a 3/10 or less post intervention  Patient Goals: 5+ hours of sleep/rest overnight    Progress made toward(s) clinical / shift goals:  Patient has remained pain free overnight    Patient is not progressing towards the following goals:

## 2023-04-05 NOTE — DISCHARGE SUMMARY
"Discharge Summary    CHIEF COMPLAINT ON ADMISSION  Chief Complaint   Patient presents with    Abdominal Pain    N/V    Chills    Diarrhea     Pt reports epigastric pain associated with N/V/D and chills started Sunday morning. Patient also states minimal bleeding after urination but no pain. Pt took Advil with no relief. Hx.of UTI years ago.     Weakness       Reason for Admission  Abdominal Pain     Admission Date  4/4/2023    CODE STATUS  Full Code    HPI & HOSPITAL COURSE  As per chart review:  \"  Ita Johnson is a 47 y.o. female with past medical history of diabetes type 2 (not on insulin), dyslipidemia, hypertension, and vitamin D deficiency who presented to the ED with chief complaint of nausea, vomiting, and epigastric discomfort.    Onset of symptoms Sunday, 4/2.  Reported generalized abdominal discomfort which is worse in epigastric region.  Pain described as pressure/sharp and woke her from sleep.  Associated diaphoresis, chills, nausea, vomiting x 1, and diarrhea (multiple episodes).  No nausea or vomiting yesterday or today.  Denies hematemesis, coffee ground emesis, and melena.  Reported blood streak on toilet paper when wiping.  Pain mildly relieved after bowel movement and Advil.  Mildly worsened with intake of beets, but not broth.  Denies prior similar symptoms.  Endorsing weakness, urinary frequency, and hematuria.  Denies fevers, dysuria, shortness of breath, and chest pain.  No known sick contacts.  Denies prior abdominal surgery.  Has intact gallbladder.  Reports rare alcohol use.  Last ETOH intake 3/25 and had 1 glass of wine.      Patient is a type II diabetic and is on metformin and empagliflozin outpatient.  Followed by primary care provider, Dr Romeo, whom she has a scheduled appointment with on April 10.      In the ED CBC remarkable for RBC 5.91, hemoglobin 16.6, hematocrit 50.8, MCHC 32.7, and MPV 8.5.  CMP remarkable for potassium 3.2, glucose 183, globulin 3.6.  Mag 2.1.  Lipase 2238.  " "Beta-hCG negative.  UA showed >1000 glucose, large occult blood, moderate epithelial cells, and few bacteria.  Lipid panel remarkable for triglycerides 197.  Received 1 L normal saline bolus, 4 mg morphine, 4 mg Zofran, and 10 mEq potassium chloride.  Admitted to observation for acute pancreatitis.\"    Patient was admitted for further management and care.  Initially there was not a clear etiology for pancreatitis.  The patient did not have elevated liver function test that would suggest gallstone pancreatitis, triglyceride levels were 197 and the patient already takes gemfibrozil upon further review it seems that the patient takes empagliflozin and there have been reported cases of empagliflozin induced pancreatitis I suspect this could be the etiology.  We have told the patient to stay away from empagliflozin until reviewed with PCP.    Patient was seen at bedside this morning, currently not complaining of any more abdominal pain, nausea and vomiting and tolerating p.o.    Of note the patient had a CT scan done which reported myometrial nodule, concerning for myoma.  The patient states that she does not have an OB/GYN so we have placed referral to OB/GYN.    The patient will be discharged home, she will require close follow-up with PCP as an outpatient. Also with OBGYN      Therefore, she is discharged in fair and stable condition to home with close outpatient follow-up.    The patient recovered much more quickly than anticipated on admission.    Discharge Date  04/05/2023    FOLLOW UP ITEMS POST DISCHARGE  Patient will require close follow up as outpatient.    DISCHARGE DIAGNOSES  Principal Problem:    Drug induced acute pancreatitis without necrosis or infection POA: Yes  Active Problems:    Type 2 diabetes mellitus with hyperglycemia (HCC) POA: Yes      Overview: Recent A1c came back at 7.5.  She is on metformin 1000 mg daily.  She eats       a lot of rice.  Physically active at work.            She did eye exam " with ophthalmology at [facility].            Lab Results       Component Value Date/Time        HBA1C 7.5 (H) 11/03/2022 06:47 AM            HTN (hypertension) POA: Yes      Overview: Blood pressure today came back at 118/64.  She is taking losartan       hydrochlorothiazide 112.5 mg daily.  No side effects with medication.  No       chest pain palpitations, shortness of breath, lower leg swelling.    Hyperlipidemia POA: Unknown    Polycythemia POA: Unknown    Hypokalemia POA: Unknown    Myoma POA: Unknown  Resolved Problems:    * No resolved hospital problems. *      FOLLOW UP  Future Appointments   Date Time Provider Department Center   4/10/2023  1:40 PM Eleuterio Romeo M.D. [facility]   4/16/2023  7:00 AM [facility] [address]ATHR[address] SHarpreet Romeo M.D.  [address]  [address]  [facility] [address]  [phone]    Schedule an appointment as soon as possible for a visit      OB/GYN    Schedule an appointment as soon as possible for a visit        MEDICATIONS ON DISCHARGE     Medication List        CHANGE how you take these medications        Instructions   losartan-hydrochlorothiazide 100-12.5 MG per tablet  What changed: when to take this  Commonly known as: HYZAAR   Take 1 Tablet by mouth every day.  Dose: 1 Tablet     metFORMIN  MG Tb24  What changed: when to take this  Commonly known as: GLUCOPHAGE XR   Doctor's comments: **Patient requests 90 days supply**  TAKE 2 TABLETS BY MOUTH TWICE DAILY            CONTINUE taking these medications        Instructions   B-12 PO   Take 1 Tablet by mouth every day.  Dose: 1 Tablet     BIOTIN PO   Take 1 Tablet by mouth every day.  Dose: 1 Tablet     D3 PO   Take 2 Capsules by mouth every day. Pt is not sure the strength (OTC)  Dose: 2 Capsule     FISH OIL PO   Take 2 Capsules by mouth every day.  Dose: 2 Capsule     gemfibrozil 600 MG Tabs  Commonly known as: LOPID   Take 1 Tablet by mouth 2 times a day.  Dose: 600 mg     ibuprofen 200 MG  Tabs  Commonly known as: MOTRIN   Take 400 mg by mouth every 6 hours as needed. Indications: Pain  Dose: 400 mg     VITAMIN C PO   Take 1 Tablet by mouth every day.  Dose: 1 Tablet            STOP taking these medications      Empagliflozin 25 MG Tabs     QC DAYTIME COLD/FLU PO     QC NIGHTTIME COLD & FLU PO            ASK your doctor about these medications        Instructions   vitamin D2 (Ergocalciferol) 1.25 MG (03613 UT) Caps capsule  Commonly known as: Drisdol   Take 1 Capsule by mouth every 7 days.  Dose: 50,000 Units              Allergies  No Known Allergies    DIET  Orders Placed This Encounter   Procedures    Diet Order Diet: Full Liquid (Diabetic)     Standing Status:   Standing     Number of Occurrences:   1     Order Specific Question:   Diet:     Answer:   Full Liquid [11]     Comments:   Diabetic       ACTIVITY  As tolerated.  Weight bearing as tolerated    CONSULTATIONS  None    PROCEDURES      CT-ABDOMEN-PELVIS WITH   Final Result      1.  No evidence of acute inflammatory process in the abdomen or pelvis   2.  Myometrial nodules compatible with myomas as seen on the prior ultrasound           LABORATORY  Lab Results   Component Value Date    SODIUM 142 04/05/2023    POTASSIUM 3.4 (L) 04/05/2023    CHLORIDE 107 04/05/2023    CO2 26 04/05/2023    GLUCOSE 83 04/05/2023    BUN 10 04/05/2023    CREATININE 0.37 (L) 04/05/2023        Lab Results   Component Value Date    WBC 8.2 04/05/2023    HEMOGLOBIN 13.4 04/05/2023    HEMATOCRIT 41.3 04/05/2023    PLATELETCT 384 04/05/2023        Total time of the discharge process exceeds 35 minutes.

## 2023-04-05 NOTE — PROGRESS NOTES
BSSR received from  Julia RODRÍGUEZ. Patient laying in bed in no apparent distress with no complaints. Plan of care discussed with patient. Bed in low position with bed brakes applied and call light in reach. Patient states no needs at this time.

## 2023-04-05 NOTE — DISCHARGE INSTRUCTIONS
Discharge Instructions    Discharged to home by car with relative. Discharged via wheelchair, hospital escort: Yes.  Special equipment needed: Not Applicable    Be sure to schedule a follow-up appointment with your primary care doctor or any specialists as instructed.     Discharge Plan:   Diet Plan: Discussed  Activity Level: Discussed  Confirmed Follow up Appointment: Patient to Call and Schedule Appointment  Confirmed Symptoms Management: Discussed  Medication Reconciliation Updated: Yes    I understand that a diet low in cholesterol, fat, and sodium is recommended for good health. Unless I have been given specific instructions below for another diet, I accept this instruction as my diet prescription.   Other diet: diabetic soft advance once tolerated     Special Instructions: None    -Is this patient being discharged with medication to prevent blood clots?  No    Is patient discharged on Warfarin / Coumadin?   No

## 2023-04-05 NOTE — CARE PLAN
Problem: Pain - Standard  Goal: Alleviation of pain or a reduction in pain to the patient’s comfort goal  Outcome: Progressing   The patient is Stable - Low risk of patient condition declining or worsening    Shift Goals  Clinical Goals: pain will be lesser than 3/10  Patient Goals: Pain management, Rest comfortably    Progress made toward(s) clinical / shift goals: Patient denies pain . Patient tolerated the diet. No nausea or vomiting episodes all throughout the shift.    Patient is not progressing towards the following goals:n/a

## 2023-04-05 NOTE — PROGRESS NOTES
Pt discharged home in good and stable condition. Reviewed all discharge instructions and answered any questions. IV discontinued. Patient preferred to walk. Tolerates well.

## 2023-04-05 NOTE — CARE PLAN
The patient is Stable - Low risk of patient condition declining or worsening    Shift Goals  Clinical Goals: Pt pain will managed at tolerable level this shift  Patient Goals: rest comfortably, pain control    Progress made toward(s) clinical / shift goals:  Pain has been controlled throughout the shift. Denies any nausea or vomiting at this moment.    Patient is not progressing towards the following goals:      Problem: Pain - Standard  Goal: Alleviation of pain or a reduction in pain to the patient’s comfort goal  Outcome: Progressing     Problem: Knowledge Deficit - Standard  Goal: Patient and family/care givers will demonstrate understanding of plan of care, disease process/condition, diagnostic tests and medications  Outcome: Progressing     Problem: Gastrointestinal Irritability  Goal: Nausea and vomiting will be absent or improve  Outcome: Progressing

## 2023-04-05 NOTE — PROGRESS NOTES
Received report from night shift RN, assumed care of pt. Sofie VSS. Pt states pain 0/10. Pt denies any nausea or vomiting, no numbness or tingling sensation. No apparent distress.   Pt updated on plan of care for the day. All questions answered. No other needs at this time. Call light and belongings within reach, bed locked and in lowest position. Pt educated to call for assistance. Hourly rounding in place.

## 2023-04-10 ENCOUNTER — OFFICE VISIT (OUTPATIENT)
Dept: MEDICAL GROUP | Facility: MEDICAL CENTER | Age: 48
End: 2023-04-10
Payer: COMMERCIAL

## 2023-04-10 VITALS
OXYGEN SATURATION: 98 % | DIASTOLIC BLOOD PRESSURE: 68 MMHG | RESPIRATION RATE: 16 BRPM | WEIGHT: 136.69 LBS | SYSTOLIC BLOOD PRESSURE: 122 MMHG | BODY MASS INDEX: 22.77 KG/M2 | HEIGHT: 65 IN | HEART RATE: 81 BPM

## 2023-04-10 DIAGNOSIS — Z12.12 SCREENING FOR COLORECTAL CANCER: ICD-10-CM

## 2023-04-10 DIAGNOSIS — D75.1 POLYCYTHEMIA: ICD-10-CM

## 2023-04-10 DIAGNOSIS — R21 SKIN RASH: ICD-10-CM

## 2023-04-10 DIAGNOSIS — E11.69 TYPE 2 DIABETES MELLITUS WITH OTHER SPECIFIED COMPLICATION, WITHOUT LONG-TERM CURRENT USE OF INSULIN (HCC): ICD-10-CM

## 2023-04-10 DIAGNOSIS — I10 PRIMARY HYPERTENSION: ICD-10-CM

## 2023-04-10 DIAGNOSIS — E78.5 DYSLIPIDEMIA ASSOCIATED WITH TYPE 2 DIABETES MELLITUS (HCC): ICD-10-CM

## 2023-04-10 DIAGNOSIS — E11.69 DYSLIPIDEMIA ASSOCIATED WITH TYPE 2 DIABETES MELLITUS (HCC): ICD-10-CM

## 2023-04-10 DIAGNOSIS — Z12.11 SCREENING FOR COLORECTAL CANCER: ICD-10-CM

## 2023-04-10 PROCEDURE — 99214 OFFICE O/P EST MOD 30 MIN: CPT | Performed by: FAMILY MEDICINE

## 2023-04-10 RX ORDER — TRIAMCINOLONE ACETONIDE 1 MG/G
1 CREAM TOPICAL 2 TIMES DAILY
Qty: 45 G | Refills: 0 | Status: SHIPPED | OUTPATIENT
Start: 2023-04-10

## 2023-04-10 ASSESSMENT — FIBROSIS 4 INDEX: FIB4 SCORE: 0.44

## 2023-04-10 ASSESSMENT — ENCOUNTER SYMPTOMS
PALPITATIONS: 0
FEVER: 0
CHILLS: 0

## 2023-04-10 NOTE — PROGRESS NOTES
FAMILY MEDICINE VISIT                                                               Chief complaint::Diagnoses of Type 2 diabetes mellitus with other specified complication, without long-term current use of insulin (HCC), Primary hypertension, Dyslipidemia associated with type 2 diabetes mellitus (HCC), Polycythemia, Screening for colorectal cancer, and Skin rash were pertinent to this visit.    History of present illness: Ita Johnson is a 47 y.o. female who presented for lab follow-up, hospital follow-up    Problem   Skin Rash    She has rash over her fifth knuckle of right hand it is not getting better.  Rash seems to be mildly erythematous and darkening of skin.     Polycythemia    During hospitalization, she had elevated hemoglobin and hematocrit level due to dehydration.  Repeat blood test showed improvement.     Type 2 Diabetes Mellitus With Other Specified Complication (Hcc)    Recent A1c came back at 6.2.  She was recently hospitalized for pancreatitis which she said happened due to Jardiance.  She discontinued Jardiance.  She is on metformin 1000 mg twice daily.     She is working on diet and exercise and lost 13 to 14 pound weights and has been feeling better.      Lab Results   Component Value Date/Time    HBA1C 6.2 (H) 02/06/2023 07:27 AM      Monofilament testing with a 10 gram force: sensation intact: intact bilaterally  Visual Inspection: Feet without maceration, ulcers, fissures.  Pedal pulses: intact bilaterally     Dyslipidemia associated with type 2 diabetes mellitus (HCC)    Recent triglyceride numbers significantly got better.  She is on gemfibrozil 600 mg 2 times daily    Lab Results   Component Value Date/Time    CHOLSTRLTOT 77 (L) 02/06/2023 0727    TRIGLYCERIDE 197 (H) 04/04/2023 0537    HDL 31 (A) 02/06/2023 0727    LDL 29 02/06/2023 0727        Htn (Hypertension)    Blood pressure today came back at 122/68.  She is taking losartan hydrochlorothiazide 100 -12.5 mg daily.  No side effects  "with medication.  No chest pain palpitations, shortness of breath, lower leg swelling.           Review of systems:     Review of Systems   Constitutional:  Negative for chills, fever and malaise/fatigue.   Cardiovascular:  Negative for chest pain, palpitations and leg swelling.   Skin:  Positive for rash.      Medications and Allergies:     Current Outpatient Medications   Medication Sig Dispense Refill    triamcinolone acetonide (KENALOG) 0.1 % Cream Apply 1 Application. topically 2 times a day. 45 g 0    losartan-hydrochlorothiazide (HYZAAR) 100-12.5 MG per tablet Take 1 Tablet by mouth every day. (Patient taking differently: Take 1 Tablet by mouth every evening.) 90 Tablet 0    Cholecalciferol (D3 PO) Take 2 Capsules by mouth every day. Pt is not sure the strength (OTC)      Omega-3 Fatty Acids (FISH OIL PO) Take 2 Capsules by mouth every day.      ibuprofen (MOTRIN) 200 MG Tab Take 400 mg by mouth every 6 hours as needed. Indications: Pain      Ascorbic Acid (VITAMIN C PO) Take 1 Tablet by mouth every day.      Cyanocobalamin (B-12 PO) Take 1 Tablet by mouth every day.      BIOTIN PO Take 1 Tablet by mouth every day.      gemfibrozil (LOPID) 600 MG Tab Take 1 Tablet by mouth 2 times a day. 180 Tablet 3    vitamin D2, Ergocalciferol, (DRISDOL) 1.25 MG (06819 UT) Cap capsule Take 1 Capsule by mouth every 7 days. (Patient taking differently: Take 50,000 Units by mouth every 7 days. On Sunday) 12 Capsule 1    metFORMIN ER (GLUCOPHAGE XR) 500 MG TABLET SR 24 HR TAKE 2 TABLETS BY MOUTH TWICE DAILY (Patient taking differently: Take 1,000 mg by mouth 2 times a day.) 360 Tablet 3     No current facility-administered medications for this visit.          Vitals:    /68   Pulse 81   Resp 16   Ht 1.651 m (5' 5\")   Wt 62 kg (136 lb 11 oz)   SpO2 98%  Body mass index is 22.75 kg/m².    Physical Exam:     Physical Exam  Constitutional:       Appearance: Normal appearance. She is well-developed and well-groomed. "   HENT:      Head: Normocephalic and atraumatic.      Right Ear: External ear normal.      Left Ear: External ear normal.   Eyes:      General:         Right eye: No discharge.         Left eye: No discharge.      Conjunctiva/sclera: Conjunctivae normal.   Cardiovascular:      Rate and Rhythm: Normal rate.   Pulmonary:      Effort: Pulmonary effort is normal. No respiratory distress.   Musculoskeletal:      Cervical back: Neck supple.   Skin:     Findings: Rash present.      Comments: Mildly erythematous darkened skin patch over fifth knuckle of right hand   Neurological:      Mental Status: She is alert.   Psychiatric:         Mood and Affect: Mood and affect normal.         Behavior: Behavior normal.        Labs:  I reviewed with patient recent labs resulted on 4/5/2023.    Assessment/Plan:         Problem List Items Addressed This Visit       Type 2 diabetes mellitus with other specified complication (HCC)     Chronic problem, stable, continue metformin 1000 mg twice daily.  Foot exam done today.  Up-to-date for eye exam.  Continue to eat healthy diet and exercise.  Recheck labs         Relevant Orders    Comp Metabolic Panel    VITAMIN B12    HEMOGLOBIN A1C    Diabetic Monofilament LE Exam (Completed)    Skin rash     New problem, unstable, start Kenalog cream as skin appears inflamed.         Relevant Medications    triamcinolone acetonide (KENALOG) 0.1 % Cream    Polycythemia     Chronic problem, stable, recheck CBC with differential with next blood work.         Relevant Orders    CBC WITH DIFFERENTIAL    HTN (hypertension)     Chronic problem, stable, continue losartan hydrochlorothiazide combination.         Dyslipidemia associated with type 2 diabetes mellitus (HCC)     Chronic problem, stable, recheck labs to assess control.  Continue gemfibrozil 600 mg 2 times daily         Relevant Orders    Lipid Profile     Other Visit Diagnoses       Screening for colorectal cancer        Relevant Orders    COLOGCARLOS  (FIT DNA)             Please note that this dictation was created using voice recognition software. I have made every reasonable attempt to correct obvious errors, but I expect that there are errors of grammar and possibly content that I did not discover before finalizing the note.    Follow up in 6 weeks for lab follow-up.

## 2023-04-10 NOTE — ASSESSMENT & PLAN NOTE
Chronic problem, stable, continue metformin 1000 mg twice daily.  Foot exam done today.  Up-to-date for eye exam.  Continue to eat healthy diet and exercise.  Recheck labs

## 2023-04-10 NOTE — ASSESSMENT & PLAN NOTE
Chronic problem, stable, recheck labs to assess control.  Continue gemfibrozil 600 mg 2 times daily

## 2023-04-16 ENCOUNTER — HOSPITAL ENCOUNTER (EMERGENCY)
Facility: MEDICAL CENTER | Age: 48
End: 2023-04-16
Payer: COMMERCIAL

## 2023-04-16 ENCOUNTER — HOSPITAL ENCOUNTER (OUTPATIENT)
Dept: RADIOLOGY | Facility: MEDICAL CENTER | Age: 48
End: 2023-04-16
Payer: COMMERCIAL

## 2023-04-16 DIAGNOSIS — E11.9 DIABETES MELLITUS WITHOUT COMPLICATION (HCC): ICD-10-CM

## 2023-04-16 DIAGNOSIS — B18.1 HEPATITIS B, CHRONIC (HCC): ICD-10-CM

## 2023-04-16 DIAGNOSIS — E66.3 OVER WEIGHT: ICD-10-CM

## 2023-04-16 PROCEDURE — 76700 US EXAM ABDOM COMPLETE: CPT

## 2023-05-08 ENCOUNTER — GYNECOLOGY VISIT (OUTPATIENT)
Dept: OBGYN | Facility: CLINIC | Age: 48
End: 2023-05-08
Payer: COMMERCIAL

## 2023-05-08 VITALS — DIASTOLIC BLOOD PRESSURE: 90 MMHG | WEIGHT: 140 LBS | BODY MASS INDEX: 23.3 KG/M2 | SYSTOLIC BLOOD PRESSURE: 144 MMHG

## 2023-05-08 DIAGNOSIS — D25.1 FIBROIDS, INTRAMURAL: ICD-10-CM

## 2023-05-08 DIAGNOSIS — I10 ESSENTIAL HYPERTENSION: ICD-10-CM

## 2023-05-08 DIAGNOSIS — N93.9 ABNORMAL UTERINE BLEEDING (AUB): ICD-10-CM

## 2023-05-08 PROCEDURE — 99203 OFFICE O/P NEW LOW 30 MIN: CPT | Performed by: OBSTETRICS & GYNECOLOGY

## 2023-05-08 ASSESSMENT — FIBROSIS 4 INDEX: FIB4 SCORE: 0.44

## 2023-05-08 NOTE — PROGRESS NOTES
Chief complaint; new patient consultation    Ita Johnson is a 47 y.o.  who presents referred from her primary care provider due to CT scan which showed very small fibroids uterus.  CT scan did not give measurements but they did refer to her ultrasound in 2019 which showed fibroid in the uterine fundus on the right side measuring 2 cm x 1.7 cm and 1 on the left side of the uterus measuring 2 cm x 1.89 cm the ovaries appeared normal there is no free pelvic fluid.  Patient states that her menstrual cycles last 4 days but could be having the first day  She states she has pain during her menstrual cycle relieved by Motrin  Patient denies dyspareunia  Patient states she has had this problem for several years    Last Pap smear 3 years ago    Review of systems; denies fever chills abdominal pain, denies chest pain shortness of breath or urinary symptoms  Past medical history-  Past Medical History:   Diagnosis Date    DM type 2 (diabetes mellitus, type 2) (HCC)     Dyslipidemia     HTN (hypertension)     Vitamin D deficiency      Past surgical history-History reviewed. No pertinent surgical history.  Allergies-Patient has no known allergies.  Medications-  Current Outpatient Medications on File Prior to Visit   Medication Sig Dispense Refill    triamcinolone acetonide (KENALOG) 0.1 % Cream Apply 1 Application. topically 2 times a day. 45 g 0    Cholecalciferol (D3 PO) Take 2 Capsules by mouth every day. Pt is not sure the strength (OTC)      Omega-3 Fatty Acids (FISH OIL PO) Take 2 Capsules by mouth every day.      ibuprofen (MOTRIN) 200 MG Tab Take 400 mg by mouth every 6 hours as needed. Indications: Pain      Ascorbic Acid (VITAMIN C PO) Take 1 Tablet by mouth every day.      Cyanocobalamin (B-12 PO) Take 1 Tablet by mouth every day.      BIOTIN PO Take 1 Tablet by mouth every day.      losartan-hydrochlorothiazide (HYZAAR) 100-12.5 MG per tablet Take 1 Tablet by mouth every day. (Patient taking differently:  Take 1 Tablet by mouth every evening.) 90 Tablet 0    gemfibrozil (LOPID) 600 MG Tab Take 1 Tablet by mouth 2 times a day. 180 Tablet 3    vitamin D2, Ergocalciferol, (DRISDOL) 1.25 MG (25928 UT) Cap capsule Take 1 Capsule by mouth every 7 days. (Patient taking differently: Take 50,000 Units by mouth every 7 days. On Sunday) 12 Capsule 1    metFORMIN ER (GLUCOPHAGE XR) 500 MG TABLET SR 24 HR TAKE 2 TABLETS BY MOUTH TWICE DAILY (Patient taking differently: Take 1,000 mg by mouth 2 times a day.) 360 Tablet 3     No current facility-administered medications on file prior to visit.     Social history-  Social History     Socioeconomic History    Marital status:      Spouse name: Not on file    Number of children: 0    Years of education: Not on file    Highest education level: Not on file   Occupational History    Occupation: [employer] - [employer]    Tobacco Use    Smoking status: Every Day     Packs/day: 1.00     Years: 30.00     Pack years: 30.00     Types: Cigarettes    Smokeless tobacco: Never   Vaping Use    Vaping Use: Never used   Substance and Sexual Activity    Alcohol use: Yes     Alcohol/week: 0.0 oz     Comment: occasional    Drug use: No    Sexual activity: Yes     Partners: Male   Other Topics Concern    Not on file   Social History Narrative    Not on file     Social Determinants of Health     Financial Resource Strain: Not on file   Food Insecurity: Not on file   Transportation Needs: Not on file   Physical Activity: Not on file   Stress: Not on file   Social Connections: Not on file   Intimate Partner Violence: Not on file   Housing Stability: Not on file     Past Family History-no history of breast or ovarian cancer    Physical examination;  Alert and oriented x3  General a thin well-developed well-nourished female in no apparent distress  Vitals:    05/08/23 0814   BP: (!) 144/90   BP Location: Right arm   Patient Position: Sitting   BP Cuff Size: Small adult   Weight: 140 lb     Skin is  warm and dry  Neck-supple  HEENT-head-atraumatic, normocephalic, EOMI, PERRLA  Cardiovascular-regular rate and rhythm, normal S1-S2, no murmurs or gallops  Lungs-clear to auscultation bilaterally  Back-negative CVA tenderness  Abdomen-nondistended positive bowel sounds soft nontender no masses or hepatosplenomegaly  Pelvic examination;  External genitalia-no visible lesions   Vagina-no blood or discharge  Cervix-no gross lesions  Uterus-normal size and shape, nontender  Adnexa without mass or tenderness  Extremities without cyanosis clubbing or edema  Neurologic exam grossly intact    Impression;  AUB-slightly increased heaviness of her menstrual cycles on the first day  Dysmenorrhea-likely primary  Uterine fibroids-minimal-not likely to be causing any symptoms.  Hypertension-blood pressure 144/90    Plan;  Endometrial biopsy  Needs annual/Pap smear last Pap smear was 3 years ago-last mammogram November 2022-normal  Needs transvaginal ultrasound to see if fibroids have grown in size on ultrasound which is a better modality for visualizing uterus  May consider Mirena IUD-patient is approaching menopause in her fibroids will likely shrink in her vaginal bleeding will decrease    Patient is follow-up with primary care provider for blood pressure evaluation/management        30  Minutes spent with the patient in face-to-face contact, greater than 50% of the time spent on counseling and coordination of care. All questions answered in detail.    Female chaperone present for entire examination and history

## 2023-05-09 ENCOUNTER — HOSPITAL ENCOUNTER (OUTPATIENT)
Dept: RADIOLOGY | Facility: MEDICAL CENTER | Age: 48
End: 2023-05-09
Attending: OBSTETRICS & GYNECOLOGY
Payer: COMMERCIAL

## 2023-05-09 DIAGNOSIS — D25.1 FIBROIDS, INTRAMURAL: ICD-10-CM

## 2023-05-09 PROCEDURE — 76830 TRANSVAGINAL US NON-OB: CPT

## 2023-05-16 ENCOUNTER — TELEPHONE (OUTPATIENT)
Dept: MEDICAL GROUP | Facility: PHYSICIAN GROUP | Age: 48
End: 2023-05-16
Payer: COMMERCIAL

## 2023-05-25 DIAGNOSIS — E11.69 TYPE 2 DIABETES MELLITUS WITH OTHER SPECIFIED COMPLICATION, WITHOUT LONG-TERM CURRENT USE OF INSULIN (HCC): ICD-10-CM

## 2023-05-25 RX ORDER — METFORMIN HYDROCHLORIDE 500 MG/1
1000 TABLET, EXTENDED RELEASE ORAL 2 TIMES DAILY
Qty: 360 TABLET | Refills: 3 | Status: SHIPPED | OUTPATIENT
Start: 2023-05-25 | End: 2023-12-15

## 2023-05-25 NOTE — TELEPHONE ENCOUNTER
Received request via: Pharmacy  Requesting 1800 tablets  Was the patient seen in the last year in this department? Yes  LOV 4/10/23  Does the patient have an active prescription (recently filled or refills available) for medication(s) requested? No    Does the patient have group home Plus and need 100 day supply (blood pressure, diabetes and cholesterol meds only)? Patient does not have SCP

## 2023-06-02 ENCOUNTER — GYNECOLOGY VISIT (OUTPATIENT)
Dept: OBGYN | Facility: CLINIC | Age: 48
End: 2023-06-02
Payer: COMMERCIAL

## 2023-06-02 ENCOUNTER — HOSPITAL ENCOUNTER (OUTPATIENT)
Dept: LAB | Facility: MEDICAL CENTER | Age: 48
End: 2023-06-02
Attending: FAMILY MEDICINE
Payer: COMMERCIAL

## 2023-06-02 ENCOUNTER — HOSPITAL ENCOUNTER (OUTPATIENT)
Facility: MEDICAL CENTER | Age: 48
End: 2023-06-02
Attending: OBSTETRICS & GYNECOLOGY
Payer: COMMERCIAL

## 2023-06-02 VITALS — BODY MASS INDEX: 23.13 KG/M2 | DIASTOLIC BLOOD PRESSURE: 77 MMHG | WEIGHT: 139 LBS | SYSTOLIC BLOOD PRESSURE: 135 MMHG

## 2023-06-02 DIAGNOSIS — N93.9 ABNORMAL UTERINE BLEEDING (AUB): Primary | ICD-10-CM

## 2023-06-02 DIAGNOSIS — E11.69 TYPE 2 DIABETES MELLITUS WITH OTHER SPECIFIED COMPLICATION, WITHOUT LONG-TERM CURRENT USE OF INSULIN (HCC): ICD-10-CM

## 2023-06-02 DIAGNOSIS — D75.1 POLYCYTHEMIA: ICD-10-CM

## 2023-06-02 DIAGNOSIS — E78.5 DYSLIPIDEMIA ASSOCIATED WITH TYPE 2 DIABETES MELLITUS (HCC): ICD-10-CM

## 2023-06-02 DIAGNOSIS — N93.9 ABNORMAL UTERINE BLEEDING (AUB): ICD-10-CM

## 2023-06-02 DIAGNOSIS — E11.69 DYSLIPIDEMIA ASSOCIATED WITH TYPE 2 DIABETES MELLITUS (HCC): ICD-10-CM

## 2023-06-02 LAB
ALBUMIN SERPL BCP-MCNC: 4.7 G/DL (ref 3.2–4.9)
ALBUMIN/GLOB SERPL: 1.4 G/DL
ALP SERPL-CCNC: 70 U/L (ref 30–99)
ALT SERPL-CCNC: 14 U/L (ref 2–50)
ANION GAP SERPL CALC-SCNC: 15 MMOL/L (ref 7–16)
AST SERPL-CCNC: 11 U/L (ref 12–45)
BASOPHILS # BLD AUTO: 0.8 % (ref 0–1.8)
BASOPHILS # BLD: 0.07 K/UL (ref 0–0.12)
BILIRUB SERPL-MCNC: 0.2 MG/DL (ref 0.1–1.5)
BUN SERPL-MCNC: 15 MG/DL (ref 8–22)
CALCIUM ALBUM COR SERPL-MCNC: 8.6 MG/DL (ref 8.5–10.5)
CALCIUM SERPL-MCNC: 9.2 MG/DL (ref 8.5–10.5)
CHLORIDE SERPL-SCNC: 103 MMOL/L (ref 96–112)
CHOLEST SERPL-MCNC: 105 MG/DL (ref 100–199)
CO2 SERPL-SCNC: 24 MMOL/L (ref 20–33)
CREAT SERPL-MCNC: 0.42 MG/DL (ref 0.5–1.4)
EOSINOPHIL # BLD AUTO: 0.63 K/UL (ref 0–0.51)
EOSINOPHIL NFR BLD: 7.2 % (ref 0–6.9)
ERYTHROCYTE [DISTWIDTH] IN BLOOD BY AUTOMATED COUNT: 51.1 FL (ref 35.9–50)
EST. AVERAGE GLUCOSE BLD GHB EST-MCNC: 131 MG/DL
FASTING STATUS PATIENT QL REPORTED: NORMAL
GFR SERPLBLD CREATININE-BSD FMLA CKD-EPI: 122 ML/MIN/1.73 M 2
GLOBULIN SER CALC-MCNC: 3.4 G/DL (ref 1.9–3.5)
GLUCOSE SERPL-MCNC: 125 MG/DL (ref 65–99)
HBA1C MFR BLD: 6.2 % (ref 4–5.6)
HCT VFR BLD AUTO: 47.6 % (ref 37–47)
HDLC SERPL-MCNC: 32 MG/DL
HGB BLD-MCNC: 15.8 G/DL (ref 12–16)
IMM GRANULOCYTES # BLD AUTO: 0.03 K/UL (ref 0–0.11)
IMM GRANULOCYTES NFR BLD AUTO: 0.3 % (ref 0–0.9)
LDLC SERPL CALC-MCNC: 42 MG/DL
LYMPHOCYTES # BLD AUTO: 2.57 K/UL (ref 1–4.8)
LYMPHOCYTES NFR BLD: 29.5 % (ref 22–41)
MCH RBC QN AUTO: 29.6 PG (ref 27–33)
MCHC RBC AUTO-ENTMCNC: 33.2 G/DL (ref 32.2–35.5)
MCV RBC AUTO: 89.3 FL (ref 81.4–97.8)
MONOCYTES # BLD AUTO: 0.69 K/UL (ref 0–0.85)
MONOCYTES NFR BLD AUTO: 7.9 % (ref 0–13.4)
NEUTROPHILS # BLD AUTO: 4.72 K/UL (ref 1.82–7.42)
NEUTROPHILS NFR BLD: 54.3 % (ref 44–72)
NRBC # BLD AUTO: 0 K/UL
NRBC BLD-RTO: 0 /100 WBC (ref 0–0.2)
PATHOLOGY CONSULT NOTE: NORMAL
PLATELET # BLD AUTO: 472 K/UL (ref 164–446)
PMV BLD AUTO: 8.6 FL (ref 9–12.9)
POCT INT CON NEG: NEGATIVE
POCT INT CON POS: POSITIVE
POCT URINE PREGNANCY TEST: NEGATIVE
POTASSIUM SERPL-SCNC: 4.1 MMOL/L (ref 3.6–5.5)
PROT SERPL-MCNC: 8.1 G/DL (ref 6–8.2)
RBC # BLD AUTO: 5.33 M/UL (ref 4.2–5.4)
SODIUM SERPL-SCNC: 142 MMOL/L (ref 135–145)
TRIGL SERPL-MCNC: 154 MG/DL (ref 0–149)
VIT B12 SERPL-MCNC: 2308 PG/ML (ref 211–911)
WBC # BLD AUTO: 8.7 K/UL (ref 4.8–10.8)

## 2023-06-02 PROCEDURE — 80061 LIPID PANEL: CPT

## 2023-06-02 PROCEDURE — 81025 URINE PREGNANCY TEST: CPT | Performed by: OBSTETRICS & GYNECOLOGY

## 2023-06-02 PROCEDURE — 85025 COMPLETE CBC W/AUTO DIFF WBC: CPT

## 2023-06-02 PROCEDURE — 88305 TISSUE EXAM BY PATHOLOGIST: CPT

## 2023-06-02 PROCEDURE — 3075F SYST BP GE 130 - 139MM HG: CPT | Performed by: OBSTETRICS & GYNECOLOGY

## 2023-06-02 PROCEDURE — 36415 COLL VENOUS BLD VENIPUNCTURE: CPT

## 2023-06-02 PROCEDURE — 82607 VITAMIN B-12: CPT

## 2023-06-02 PROCEDURE — 3078F DIAST BP <80 MM HG: CPT | Performed by: OBSTETRICS & GYNECOLOGY

## 2023-06-02 PROCEDURE — 83036 HEMOGLOBIN GLYCOSYLATED A1C: CPT

## 2023-06-02 PROCEDURE — 80053 COMPREHEN METABOLIC PANEL: CPT

## 2023-06-02 PROCEDURE — 58100 BIOPSY OF UTERUS LINING: CPT | Performed by: OBSTETRICS & GYNECOLOGY

## 2023-06-02 ASSESSMENT — FIBROSIS 4 INDEX: FIB4 SCORE: 0.44

## 2023-06-02 NOTE — PROCEDURES
Procedure note; Pipelle endometrial biopsy    Indications; AUB-slightly heavier menstrual cycle and first day    Patient did have ultrasound prior to visit shows endometrial thickness 0.66 cm    Informed consent obtained, consent signed    Pregnancy test negative    Bimanual exam reveals; normal size uterus.  Axial    Vaginal speculum placed    Betadine prep the cervix and vagina    Single-toothed tenaculum placed on the anterior lip of the cervix    Pipelle introduced without difficulty    Moderate tissue withdrawn    The patient tolerated the procedure well    Tissue sent to pathology    Followup as indicated after results available for pathology

## 2023-06-05 ENCOUNTER — OFFICE VISIT (OUTPATIENT)
Dept: MEDICAL GROUP | Facility: MEDICAL CENTER | Age: 48
End: 2023-06-05
Payer: COMMERCIAL

## 2023-06-05 VITALS
RESPIRATION RATE: 16 BRPM | SYSTOLIC BLOOD PRESSURE: 110 MMHG | BODY MASS INDEX: 22.77 KG/M2 | TEMPERATURE: 97.1 F | HEART RATE: 76 BPM | DIASTOLIC BLOOD PRESSURE: 62 MMHG | WEIGHT: 136.69 LBS | HEIGHT: 65 IN | OXYGEN SATURATION: 98 %

## 2023-06-05 DIAGNOSIS — E11.69 DYSLIPIDEMIA ASSOCIATED WITH TYPE 2 DIABETES MELLITUS (HCC): ICD-10-CM

## 2023-06-05 DIAGNOSIS — D75.1 POLYCYTHEMIA: ICD-10-CM

## 2023-06-05 DIAGNOSIS — E78.5 DYSLIPIDEMIA ASSOCIATED WITH TYPE 2 DIABETES MELLITUS (HCC): ICD-10-CM

## 2023-06-05 DIAGNOSIS — I10 PRIMARY HYPERTENSION: ICD-10-CM

## 2023-06-05 DIAGNOSIS — E11.69 TYPE 2 DIABETES MELLITUS WITH OTHER SPECIFIED COMPLICATION, WITHOUT LONG-TERM CURRENT USE OF INSULIN (HCC): ICD-10-CM

## 2023-06-05 PROCEDURE — 3074F SYST BP LT 130 MM HG: CPT | Performed by: FAMILY MEDICINE

## 2023-06-05 PROCEDURE — 3078F DIAST BP <80 MM HG: CPT | Performed by: FAMILY MEDICINE

## 2023-06-05 PROCEDURE — 99214 OFFICE O/P EST MOD 30 MIN: CPT | Performed by: FAMILY MEDICINE

## 2023-06-05 RX ORDER — LOSARTAN POTASSIUM AND HYDROCHLOROTHIAZIDE 12.5; 1 MG/1; MG/1
1 TABLET ORAL
Qty: 90 TABLET | Refills: 3 | Status: SHIPPED | OUTPATIENT
Start: 2023-06-05 | End: 2023-09-11

## 2023-06-05 RX ORDER — ROSUVASTATIN CALCIUM 20 MG/1
20 TABLET, COATED ORAL EVERY EVENING
Qty: 90 TABLET | Refills: 3 | Status: SHIPPED | OUTPATIENT
Start: 2023-06-05

## 2023-06-05 ASSESSMENT — ENCOUNTER SYMPTOMS
CHILLS: 0
PALPITATIONS: 0
FEVER: 0

## 2023-06-05 ASSESSMENT — FIBROSIS 4 INDEX: FIB4 SCORE: 0.29

## 2023-06-05 NOTE — PROGRESS NOTES
FAMILY MEDICINE VISIT                                                               Chief complaint::Diagnoses of Primary hypertension, Polycythemia, Dyslipidemia associated with type 2 diabetes mellitus (HCC), and Type 2 diabetes mellitus with other specified complication, without long-term current use of insulin (HCC) were pertinent to this visit.    History of present illness: Ita Johnson is a 47 y.o. female who presented for lab follow-up    Problem   Polycythemia    During hospitalization, she had elevated hemoglobin and hematocrit level due to dehydration.  Repeat blood test showed mildly elevated hematocrit level, hemoglobin is in normal range       Type 2 Diabetes Mellitus With Other Specified Complication (Hcc)    Recent A1c came back at 6.2.  She was recently hospitalized for pancreatitis which she said happened due to Jardiance.  She discontinued Jardiance.  She is on metformin 1000 mg twice daily.     She is working on diet and exercise and lost weight.  She has been doing an hour of treadmill on weekends.  She has been walking fast on treadmill and usually does 4 miles.  Recommended to add strength training, core strengthening and weight in exercise regimen.    Lab Results   Component Value Date/Time    HBA1C 6.2 (H) 06/02/2023 06:23 AM           Dyslipidemia associated with type 2 diabetes mellitus (HCC)    Recent triglyceride numbers significantly got better.  She is on gemfibrozil 600 mg 2 times daily.  She has been losing a lot of hair.    Lab Results   Component Value Date/Time    CHOLSTRLTOT 105 06/02/2023 0623    TRIGLYCERIDE 154 (H) 06/02/2023 0623    HDL 32 (A) 06/02/2023 0623    LDL 42 06/02/2023 0623        Htn (Hypertension)    Blood pressure today came back at 110/62.  She is taking losartan hydrochlorothiazide 100 -12.5 mg daily.  No side effects with medication.  No chest pain palpitations, shortness of breath, lower leg swelling.          Review of systems:     Review of Systems  "  Constitutional:  Negative for chills, fever and malaise/fatigue.   Cardiovascular:  Negative for chest pain, palpitations and leg swelling.        Medications and Allergies:     Current Outpatient Medications   Medication Sig Dispense Refill    rosuvastatin (CRESTOR) 20 MG Tab Take 1 Tablet by mouth every evening. 90 Tablet 3    losartan-hydrochlorothiazide (HYZAAR) 100-12.5 MG per tablet Take 1 Tablet by mouth every day. 90 Tablet 3    metFORMIN ER (GLUCOPHAGE XR) 500 MG TABLET SR 24 HR Take 2 Tablets by mouth 2 times a day. 360 Tablet 3    vitamin D2, Ergocalciferol, (DRISDOL) 1.25 MG (61925 UT) Cap capsule TAKE 1 CAPSULE BY MOUTH EVERY 7 DAYS 12 Capsule 1    triamcinolone acetonide (KENALOG) 0.1 % Cream Apply 1 Application. topically 2 times a day. 45 g 0    Cholecalciferol (D3 PO) Take 2 Capsules by mouth every day. Pt is not sure the strength (OTC)      Omega-3 Fatty Acids (FISH OIL PO) Take 2 Capsules by mouth every day.      ibuprofen (MOTRIN) 200 MG Tab Take 400 mg by mouth every 6 hours as needed. Indications: Pain      Ascorbic Acid (VITAMIN C PO) Take 1 Tablet by mouth every day.      Cyanocobalamin (B-12 PO) Take 1 Tablet by mouth every day.      BIOTIN PO Take 1 Tablet by mouth every day.       No current facility-administered medications for this visit.          Vitals:    /62   Pulse 76   Temp 36.2 °C (97.1 °F)   Resp 16   Ht 1.651 m (5' 5\")   Wt 62 kg (136 lb 11 oz)   SpO2 98%  Body mass index is 22.75 kg/m².    Physical Exam:     Physical Exam  Constitutional:       Appearance: Normal appearance. She is well-developed and well-groomed.   HENT:      Head: Normocephalic and atraumatic.      Right Ear: External ear normal.      Left Ear: External ear normal.   Eyes:      General:         Right eye: No discharge.         Left eye: No discharge.      Conjunctiva/sclera: Conjunctivae normal.   Cardiovascular:      Rate and Rhythm: Normal rate.   Pulmonary:      Effort: Pulmonary effort is " normal. No respiratory distress.   Musculoskeletal:      Cervical back: Neck supple.   Skin:     Findings: No rash.   Neurological:      Mental Status: She is alert.   Psychiatric:         Mood and Affect: Mood and affect normal.         Behavior: Behavior normal.          Labs:  I reviewed with patient recent labs resulted on 6/2/2023    Assessment/Plan:         Problem List Items Addressed This Visit       Type 2 diabetes mellitus with other specified complication (HCC)     Chronic problem, stable, continue metformin 1000 mg 2 times daily.  Recheck labs in 6 months.  Vitamin B12 Level came back elevated, recommended to take vitamin B12 supplementation every other day           Relevant Orders    Comp Metabolic Panel    HEMOGLOBIN A1C    MICROALBUMIN CREAT RATIO URINE    VITAMIN B12    Polycythemia     Chronic problem, overall stable, recheck labs in 6 months.  Drink more water.           Relevant Orders    CBC WITH DIFFERENTIAL    HTN (hypertension)     Chronic problem, stable, continue losartan hydrochlorothiazide combination.           Relevant Medications    rosuvastatin (CRESTOR) 20 MG Tab    losartan-hydrochlorothiazide (HYZAAR) 100-12.5 MG per tablet    Dyslipidemia associated with type 2 diabetes mellitus (HCC)     Chronic problem, mildly elevated triglyceride level, stop gemfibrozil and start Crestor 20 mg daily.  Recheck labs in 6 months.           Relevant Medications    rosuvastatin (CRESTOR) 20 MG Tab    Other Relevant Orders    Lipid Profile        Please note that this dictation was created using voice recognition software. I have made every reasonable attempt to correct obvious errors, but I expect that there are errors of grammar and possibly content that I did not discover before finalizing the note.    Follow up in 6 months for lab follow-up.

## 2023-06-05 NOTE — ASSESSMENT & PLAN NOTE
Chronic problem, stable, continue metformin 1000 mg 2 times daily.  Recheck labs in 6 months.  Vitamin B12 Level came back elevated, recommended to take vitamin B12 supplementation every other day

## 2023-06-05 NOTE — ASSESSMENT & PLAN NOTE
Chronic problem, mildly elevated triglyceride level, stop gemfibrozil and start Crestor 20 mg daily.  Recheck labs in 6 months.

## 2023-09-11 DIAGNOSIS — I10 PRIMARY HYPERTENSION: ICD-10-CM

## 2023-09-11 RX ORDER — LOSARTAN POTASSIUM AND HYDROCHLOROTHIAZIDE 12.5; 1 MG/1; MG/1
1 TABLET ORAL
Qty: 90 TABLET | Refills: 3 | Status: SHIPPED | OUTPATIENT
Start: 2023-09-11

## 2023-10-15 DIAGNOSIS — E55.9 VITAMIN D DEFICIENCY: ICD-10-CM

## 2023-10-16 RX ORDER — ERGOCALCIFEROL 1.25 MG/1
50000 CAPSULE ORAL
Qty: 12 CAPSULE | Refills: 1 | Status: SHIPPED | OUTPATIENT
Start: 2023-10-16

## 2023-10-17 ENCOUNTER — OFFICE VISIT (OUTPATIENT)
Dept: URGENT CARE | Facility: CLINIC | Age: 48
End: 2023-10-17
Payer: COMMERCIAL

## 2023-10-17 VITALS
BODY MASS INDEX: 22.66 KG/M2 | WEIGHT: 136 LBS | HEIGHT: 65 IN | HEART RATE: 82 BPM | SYSTOLIC BLOOD PRESSURE: 132 MMHG | TEMPERATURE: 97.8 F | DIASTOLIC BLOOD PRESSURE: 76 MMHG | RESPIRATION RATE: 18 BRPM | OXYGEN SATURATION: 97 %

## 2023-10-17 DIAGNOSIS — J00 ACUTE NASOPHARYNGITIS: ICD-10-CM

## 2023-10-17 DIAGNOSIS — R05.1 ACUTE COUGH: ICD-10-CM

## 2023-10-17 PROCEDURE — 3078F DIAST BP <80 MM HG: CPT | Performed by: PHYSICIAN ASSISTANT

## 2023-10-17 PROCEDURE — 3075F SYST BP GE 130 - 139MM HG: CPT | Performed by: PHYSICIAN ASSISTANT

## 2023-10-17 PROCEDURE — 99213 OFFICE O/P EST LOW 20 MIN: CPT | Performed by: PHYSICIAN ASSISTANT

## 2023-10-17 RX ORDER — BENZONATATE 100 MG/1
100 CAPSULE ORAL 3 TIMES DAILY PRN
Qty: 60 CAPSULE | Refills: 0 | Status: SHIPPED | OUTPATIENT
Start: 2023-10-17 | End: 2023-12-26

## 2023-10-17 RX ORDER — FLUTICASONE PROPIONATE 50 MCG
1 SPRAY, SUSPENSION (ML) NASAL DAILY
Qty: 16 G | Refills: 0 | Status: SHIPPED | OUTPATIENT
Start: 2023-10-17

## 2023-10-17 RX ORDER — GEMFIBROZIL 600 MG/1
600 TABLET, FILM COATED ORAL 2 TIMES DAILY
COMMUNITY
Start: 2023-09-11 | End: 2023-12-26

## 2023-10-17 ASSESSMENT — ENCOUNTER SYMPTOMS
SORE THROAT: 0
VOMITING: 0
EYE DISCHARGE: 0
DIZZINESS: 0
EYE REDNESS: 0
SHORTNESS OF BREATH: 0
EYE PAIN: 0
CONSTIPATION: 0
SINUS PAIN: 0
DIARRHEA: 0
NAUSEA: 0
DIAPHORESIS: 0
HEADACHES: 0
COUGH: 1
WHEEZING: 0
CHILLS: 1
ABDOMINAL PAIN: 0
FEVER: 0

## 2023-10-17 ASSESSMENT — FIBROSIS 4 INDEX: FIB4 SCORE: 0.29

## 2023-10-17 NOTE — PROGRESS NOTES
"  Subjective:     Ita Johnson  is a 48 y.o. female who presents for Cough (X1 week, runny nose, had fever, was feeling hot and chills, did a home Covid test 3-4days ago: Negative )       She presents today with a cough, runny nose, laryngitis that has been present over the past week.  Did have a negative at-home COVID test 3-4 days ago.  She is continuing to have chills but denies fever at this time.  She was experiencing fever during the first few days of symptom onset.  States that the cough is present throughout the day and did respond well to Mucinex DM.  She denies chest pain or shortness of breath, no nausea vomiting, no abdominal pain, no diarrhea       Review of Systems   Constitutional:  Positive for chills. Negative for diaphoresis, fever and malaise/fatigue.   HENT:  Negative for congestion, ear discharge, sinus pain and sore throat.    Eyes:  Negative for pain, discharge and redness.   Respiratory:  Positive for cough. Negative for shortness of breath and wheezing.    Cardiovascular:  Negative for chest pain.   Gastrointestinal:  Negative for abdominal pain, constipation, diarrhea, nausea and vomiting.   Neurological:  Negative for dizziness and headaches.      No Known Allergies  Past Medical History:   Diagnosis Date    DM type 2 (diabetes mellitus, type 2) (HCC)     Dyslipidemia     HTN (hypertension)     Vitamin D deficiency         Objective:   /76 (BP Location: Left arm, Patient Position: Sitting, BP Cuff Size: Adult)   Pulse 82   Temp 36.6 °C (97.8 °F) (Temporal)   Resp 18   Ht 1.651 m (5' 5\")   Wt 61.7 kg (136 lb)   SpO2 97%   BMI 22.63 kg/m²   Physical Exam  Vitals and nursing note reviewed.   Constitutional:       General: She is not in acute distress.     Appearance: Normal appearance. She is not ill-appearing, toxic-appearing or diaphoretic.   HENT:      Head: Normocephalic.      Right Ear: Tympanic membrane, ear canal and external ear normal. There is no impacted cerumen. "      Left Ear: Tympanic membrane, ear canal and external ear normal. There is no impacted cerumen.      Nose: Rhinorrhea present. No congestion.      Mouth/Throat:      Mouth: Mucous membranes are moist.      Pharynx: No oropharyngeal exudate or posterior oropharyngeal erythema.   Eyes:      General:         Right eye: No discharge.         Left eye: No discharge.      Conjunctiva/sclera: Conjunctivae normal.   Cardiovascular:      Rate and Rhythm: Normal rate and regular rhythm.   Pulmonary:      Effort: Pulmonary effort is normal. No respiratory distress.      Breath sounds: Normal breath sounds. No stridor. No wheezing or rhonchi.   Musculoskeletal:      Cervical back: Neck supple.   Lymphadenopathy:      Cervical: No cervical adenopathy.   Neurological:      General: No focal deficit present.      Mental Status: She is alert and oriented to person, place, and time.   Psychiatric:         Mood and Affect: Mood normal.         Behavior: Behavior normal.         Thought Content: Thought content normal.         Judgment: Judgment normal.             Diagnostic testing: None    Assessment/Plan:     Encounter Diagnoses   Name Primary?    Acute nasopharyngitis     Acute cough           Plan for care for today's complaint includes starting the patient on Flonase for acute nasopharyngitis, Tessalon Perles for acute cough.  We will withhold from COVID testing today as it would not change her management plan and she had a negative COVID test 3-4 days ago.  Vital signs are stable patient was well-appearing today.  Continue to monitor symptoms and return to urgent care or follow-up with primary care provider if symptoms remain ongoing.  Follow-up in the emergency department if symptoms become severe, ER precautions discussed in office today..  Prescription for Flonase, Tessalon Perles provided.    See AVS Instructions below for written guidance provided to patient on after-visit management and care in addition to our verbal  discussion during the visit.    Please note that this dictation was created using voice recognition software. I have attempted to correct all errors, but there may be sound-alike, spelling, grammar and possibly content errors that I did not discover before finalizing the note.    Vick Carvajal PA-C

## 2023-11-20 ENCOUNTER — HOSPITAL ENCOUNTER (OUTPATIENT)
Dept: LAB | Facility: MEDICAL CENTER | Age: 48
End: 2023-11-20
Attending: FAMILY MEDICINE
Payer: COMMERCIAL

## 2023-11-20 ENCOUNTER — HOSPITAL ENCOUNTER (OUTPATIENT)
Dept: LAB | Facility: MEDICAL CENTER | Age: 48
End: 2023-11-20
Payer: COMMERCIAL

## 2023-11-20 DIAGNOSIS — D75.1 POLYCYTHEMIA: ICD-10-CM

## 2023-11-20 DIAGNOSIS — E78.5 DYSLIPIDEMIA ASSOCIATED WITH TYPE 2 DIABETES MELLITUS (HCC): ICD-10-CM

## 2023-11-20 DIAGNOSIS — E11.69 DYSLIPIDEMIA ASSOCIATED WITH TYPE 2 DIABETES MELLITUS (HCC): ICD-10-CM

## 2023-11-20 DIAGNOSIS — E11.69 TYPE 2 DIABETES MELLITUS WITH OTHER SPECIFIED COMPLICATION, WITHOUT LONG-TERM CURRENT USE OF INSULIN (HCC): ICD-10-CM

## 2023-11-20 LAB
ALBUMIN SERPL BCP-MCNC: 4.5 G/DL (ref 3.2–4.9)
ALBUMIN SERPL BCP-MCNC: 4.7 G/DL (ref 3.2–4.9)
ALBUMIN/GLOB SERPL: 1.5 G/DL
ALP SERPL-CCNC: 68 U/L (ref 30–99)
ALP SERPL-CCNC: 68 U/L (ref 30–99)
ALT SERPL-CCNC: 27 U/L (ref 2–50)
ALT SERPL-CCNC: 30 U/L (ref 2–50)
ANION GAP SERPL CALC-SCNC: 14 MMOL/L (ref 7–16)
AST SERPL-CCNC: 20 U/L (ref 12–45)
AST SERPL-CCNC: 30 U/L (ref 12–45)
BASOPHILS # BLD AUTO: 0.5 % (ref 0–1.8)
BASOPHILS # BLD: 0.05 K/UL (ref 0–0.12)
BILIRUB CONJ SERPL-MCNC: <0.2 MG/DL (ref 0.1–0.5)
BILIRUB INDIRECT SERPL-MCNC: NORMAL MG/DL (ref 0–1)
BILIRUB SERPL-MCNC: 0.2 MG/DL (ref 0.1–1.5)
BILIRUB SERPL-MCNC: 0.3 MG/DL (ref 0.1–1.5)
BUN SERPL-MCNC: 19 MG/DL (ref 8–22)
CALCIUM ALBUM COR SERPL-MCNC: 8.6 MG/DL (ref 8.5–10.5)
CALCIUM SERPL-MCNC: 9.2 MG/DL (ref 8.5–10.5)
CHLORIDE SERPL-SCNC: 101 MMOL/L (ref 96–112)
CHOLEST SERPL-MCNC: 71 MG/DL (ref 100–199)
CO2 SERPL-SCNC: 24 MMOL/L (ref 20–33)
CREAT SERPL-MCNC: 0.47 MG/DL (ref 0.5–1.4)
CREAT UR-MCNC: 106.78 MG/DL
EOSINOPHIL # BLD AUTO: 0.27 K/UL (ref 0–0.51)
EOSINOPHIL NFR BLD: 2.5 % (ref 0–6.9)
ERYTHROCYTE [DISTWIDTH] IN BLOOD BY AUTOMATED COUNT: 50.3 FL (ref 35.9–50)
EST. AVERAGE GLUCOSE BLD GHB EST-MCNC: 126 MG/DL
FASTING STATUS PATIENT QL REPORTED: NORMAL
GFR SERPLBLD CREATININE-BSD FMLA CKD-EPI: 118 ML/MIN/1.73 M 2
GLOBULIN SER CALC-MCNC: 3.2 G/DL (ref 1.9–3.5)
GLUCOSE SERPL-MCNC: 123 MG/DL (ref 65–99)
HBA1C MFR BLD: 6 % (ref 4–5.6)
HBV SURFACE AG SER QL: REACTIVE
HCT VFR BLD AUTO: 50 % (ref 37–47)
HDLC SERPL-MCNC: 26 MG/DL
HGB BLD-MCNC: 16.6 G/DL (ref 12–16)
IMM GRANULOCYTES # BLD AUTO: 0.05 K/UL (ref 0–0.11)
IMM GRANULOCYTES NFR BLD AUTO: 0.5 % (ref 0–0.9)
LDLC SERPL CALC-MCNC: ABNORMAL MG/DL
LYMPHOCYTES # BLD AUTO: 3.25 K/UL (ref 1–4.8)
LYMPHOCYTES NFR BLD: 30.4 % (ref 22–41)
MCH RBC QN AUTO: 30.9 PG (ref 27–33)
MCHC RBC AUTO-ENTMCNC: 33.2 G/DL (ref 32.2–35.5)
MCV RBC AUTO: 93.1 FL (ref 81.4–97.8)
MICROALBUMIN UR-MCNC: 2.8 MG/DL
MICROALBUMIN/CREAT UR: 26 MG/G (ref 0–30)
MONOCYTES # BLD AUTO: 1.02 K/UL (ref 0–0.85)
MONOCYTES NFR BLD AUTO: 9.5 % (ref 0–13.4)
NEUTROPHILS # BLD AUTO: 6.05 K/UL (ref 1.82–7.42)
NEUTROPHILS NFR BLD: 56.6 % (ref 44–72)
NRBC # BLD AUTO: 0 K/UL
NRBC BLD-RTO: 0 /100 WBC (ref 0–0.2)
PLATELET # BLD AUTO: 385 K/UL (ref 164–446)
PMV BLD AUTO: 9 FL (ref 9–12.9)
POTASSIUM SERPL-SCNC: 3.9 MMOL/L (ref 3.6–5.5)
PROT SERPL-MCNC: 7.8 G/DL (ref 6–8.2)
PROT SERPL-MCNC: 7.9 G/DL (ref 6–8.2)
RBC # BLD AUTO: 5.37 M/UL (ref 4.2–5.4)
SODIUM SERPL-SCNC: 139 MMOL/L (ref 135–145)
TRIGL SERPL-MCNC: 276 MG/DL (ref 0–149)
VIT B12 SERPL-MCNC: 1528 PG/ML (ref 211–911)
WBC # BLD AUTO: 10.7 K/UL (ref 4.8–10.8)

## 2023-11-20 PROCEDURE — 82607 VITAMIN B-12: CPT

## 2023-11-20 PROCEDURE — 87340 HEPATITIS B SURFACE AG IA: CPT

## 2023-11-20 PROCEDURE — 80076 HEPATIC FUNCTION PANEL: CPT

## 2023-11-20 PROCEDURE — 85025 COMPLETE CBC W/AUTO DIFF WBC: CPT

## 2023-11-20 PROCEDURE — 83036 HEMOGLOBIN GLYCOSYLATED A1C: CPT

## 2023-11-20 PROCEDURE — 36415 COLL VENOUS BLD VENIPUNCTURE: CPT

## 2023-11-20 PROCEDURE — 82570 ASSAY OF URINE CREATININE: CPT

## 2023-11-20 PROCEDURE — 87341 HEP B SURFACE AG NEUTRLZJ IA: CPT

## 2023-11-20 PROCEDURE — 87517 HEPATITIS B DNA QUANT: CPT

## 2023-11-20 PROCEDURE — 80053 COMPREHEN METABOLIC PANEL: CPT

## 2023-11-20 PROCEDURE — 82043 UR ALBUMIN QUANTITATIVE: CPT

## 2023-11-20 PROCEDURE — 80061 LIPID PANEL: CPT

## 2023-11-23 LAB
HBV DNA SERPL NAA+PROBE-ACNC: ABNORMAL IU/ML
HBV DNA SERPL NAA+PROBE-LOG IU: 3.39 LOG IU/ML
HBV DNA SERPL QL NAA+PROBE: DETECTED
HBV SURFACE AG SERPL QL NT: POSITIVE

## 2023-12-05 ENCOUNTER — APPOINTMENT (OUTPATIENT)
Dept: MEDICAL GROUP | Facility: MEDICAL CENTER | Age: 48
End: 2023-12-05
Payer: COMMERCIAL

## 2023-12-11 DIAGNOSIS — J00 ACUTE NASOPHARYNGITIS: ICD-10-CM

## 2023-12-14 DIAGNOSIS — E11.69 TYPE 2 DIABETES MELLITUS WITH OTHER SPECIFIED COMPLICATION, WITHOUT LONG-TERM CURRENT USE OF INSULIN (HCC): ICD-10-CM

## 2023-12-15 RX ORDER — METFORMIN HYDROCHLORIDE 500 MG/1
1000 TABLET, EXTENDED RELEASE ORAL 2 TIMES DAILY
Qty: 360 TABLET | Refills: 3 | Status: SHIPPED | OUTPATIENT
Start: 2023-12-15 | End: 2023-12-26

## 2023-12-26 ENCOUNTER — OFFICE VISIT (OUTPATIENT)
Dept: MEDICAL GROUP | Facility: MEDICAL CENTER | Age: 48
End: 2023-12-26
Payer: COMMERCIAL

## 2023-12-26 VITALS
OXYGEN SATURATION: 99 % | HEART RATE: 94 BPM | SYSTOLIC BLOOD PRESSURE: 108 MMHG | RESPIRATION RATE: 16 BRPM | WEIGHT: 138.89 LBS | TEMPERATURE: 97.6 F | HEIGHT: 65 IN | DIASTOLIC BLOOD PRESSURE: 62 MMHG | BODY MASS INDEX: 23.14 KG/M2

## 2023-12-26 DIAGNOSIS — E78.5 DYSLIPIDEMIA ASSOCIATED WITH TYPE 2 DIABETES MELLITUS (HCC): ICD-10-CM

## 2023-12-26 DIAGNOSIS — Z23 NEED FOR VACCINATION: ICD-10-CM

## 2023-12-26 DIAGNOSIS — E11.69 DYSLIPIDEMIA ASSOCIATED WITH TYPE 2 DIABETES MELLITUS (HCC): ICD-10-CM

## 2023-12-26 DIAGNOSIS — E11.69 TYPE 2 DIABETES MELLITUS WITH OTHER SPECIFIED COMPLICATION, WITHOUT LONG-TERM CURRENT USE OF INSULIN (HCC): ICD-10-CM

## 2023-12-26 DIAGNOSIS — D75.1 POLYCYTHEMIA: ICD-10-CM

## 2023-12-26 PROCEDURE — 3078F DIAST BP <80 MM HG: CPT | Performed by: FAMILY MEDICINE

## 2023-12-26 PROCEDURE — 90471 IMMUNIZATION ADMIN: CPT | Performed by: FAMILY MEDICINE

## 2023-12-26 PROCEDURE — 99214 OFFICE O/P EST MOD 30 MIN: CPT | Mod: 25 | Performed by: FAMILY MEDICINE

## 2023-12-26 PROCEDURE — 3074F SYST BP LT 130 MM HG: CPT | Performed by: FAMILY MEDICINE

## 2023-12-26 PROCEDURE — 90686 IIV4 VACC NO PRSV 0.5 ML IM: CPT | Performed by: FAMILY MEDICINE

## 2023-12-26 RX ORDER — SEMAGLUTIDE 0.68 MG/ML
0.25 INJECTION, SOLUTION SUBCUTANEOUS
Qty: 3 ML | Refills: 0 | Status: SHIPPED | OUTPATIENT
Start: 2023-12-26 | End: 2024-01-25 | Stop reason: SDUPTHER

## 2023-12-26 ASSESSMENT — ENCOUNTER SYMPTOMS
FEVER: 0
CHILLS: 0
PALPITATIONS: 0

## 2023-12-26 ASSESSMENT — FIBROSIS 4 INDEX: FIB4 SCORE: 0.47

## 2023-12-26 NOTE — ASSESSMENT & PLAN NOTE
Chronic problem, unstable triglyceride, counseled regarding dietary modification.  Recheck labs in 3 months, continue Crestor 20 mg daily

## 2023-12-26 NOTE — PROGRESS NOTES
FAMILY MEDICINE VISIT                                                               Chief complaint::Diagnoses of Dyslipidemia associated with type 2 diabetes mellitus (HCC), Type 2 diabetes mellitus with other specified complication, without long-term current use of insulin (HCC), Need for vaccination, and Polycythemia were pertinent to this visit.    History of present illness: Ita Johnson is a 48 y.o. female who presented for medication follow-up.    Problem   Polycythemia    During hospitalization, she had elevated hemoglobin and hematocrit level due to dehydration.  Repeat blood test showed mildly elevated hematocrit level, hemoglobin is in normal range.  Repeat blood test recently showed again elevated hemoglobin and hematocrit.  She reports that sometimes she gets headache.     Type 2 Diabetes Mellitus With Other Specified Complication (Hcc)    Recent A1c came back at 6.0.  Previously she was recently hospitalized for pancreatitis which she said happened due to Jardiance.  She discontinued Jardiance.  She is on metformin 1000 mg twice daily.  She reports that she is having side effects with metformin  She reports that her family members are normal Ozempic and they are doing very well from diabetes perspective, she would like to try Ozempic    Lab Results   Component Value Date/Time    HBA1C 6.0 (H) 11/20/2023 06:16 AM           Dyslipidemia Associated With Type 2 Diabetes Mellitus (Hcc)      Recent provide labs showed elevated triglyceride level, low HDL, total cholesterol is low.  On Crestor 20 mg daily  Lab Results   Component Value Date/Time    CHOLSTRLTOT 71 (L) 11/20/2023 0616    TRIGLYCERIDE 276 (H) 11/20/2023 0616    HDL 26 (A) 11/20/2023 0616    LDL see below 11/20/2023 0616                Review of systems:     Review of Systems   Constitutional:  Negative for chills and fever.   Cardiovascular:  Negative for chest pain, palpitations and leg swelling.        Medications and Allergies:  "    Current Outpatient Medications   Medication Sig Dispense Refill    Semaglutide,0.25 or 0.5MG/DOS, (OZEMPIC, 0.25 OR 0.5 MG/DOSE,) 2 MG/3ML Solution Pen-injector Inject 0.25 mg under the skin every 7 days. 3 mL 0    fluticasone (FLONASE) 50 MCG/ACT nasal spray Administer 1 Spray into affected nostril(S) every day. 16 g 0    vitamin D2, Ergocalciferol, (DRISDOL) 1.25 MG (32358 UT) Cap capsule TAKE 1 CAPSULE BY MOUTH EVERY 7 DAYS 12 Capsule 1    losartan-hydrochlorothiazide (HYZAAR) 100-12.5 MG per tablet TAKE 1 TABLET BY MOUTH EVERY DAY 90 Tablet 3    rosuvastatin (CRESTOR) 20 MG Tab Take 1 Tablet by mouth every evening. 90 Tablet 3    triamcinolone acetonide (KENALOG) 0.1 % Cream Apply 1 Application. topically 2 times a day. 45 g 0    Cholecalciferol (D3 PO) Take 2 Capsules by mouth every day. Pt is not sure the strength (OTC)      Omega-3 Fatty Acids (FISH OIL PO) Take 2 Capsules by mouth every day.      Ascorbic Acid (VITAMIN C PO) Take 1 Tablet by mouth every day.      Cyanocobalamin (B-12 PO) Take 1 Tablet by mouth every day.      BIOTIN PO Take 1 Tablet by mouth every day.       No current facility-administered medications for this visit.          Vitals:    /62   Pulse 94   Temp 36.4 °C (97.6 °F)   Resp 16   Ht 1.651 m (5' 5\")   Wt 63 kg (138 lb 14.2 oz)   SpO2 99%  Body mass index is 23.11 kg/m².    Physical Exam:     Physical Exam  Constitutional:       Appearance: Normal appearance. She is well-developed and well-groomed.   HENT:      Head: Normocephalic and atraumatic.      Right Ear: External ear normal.      Left Ear: External ear normal.   Eyes:      General:         Right eye: No discharge.         Left eye: No discharge.      Conjunctiva/sclera: Conjunctivae normal.   Cardiovascular:      Rate and Rhythm: Normal rate.   Pulmonary:      Effort: Pulmonary effort is normal. No respiratory distress.   Musculoskeletal:      Cervical back: Neck supple.   Skin:     Findings: No rash. "   Neurological:      Mental Status: She is alert.   Psychiatric:         Mood and Affect: Mood and affect normal.         Behavior: Behavior normal.          Labs:  I reviewed with patient recent labs resulted on 11/20/2023.    Assessment/Plan:         Problem List Items Addressed This Visit       Type 2 diabetes mellitus with other specified complication (HCC)     Chronic problem, stable, discontinue metformin.  Discussed with patient that Ozempic is a GLP-1 agonist and pancreatitis is side effect of this medication.  She would like to try Ozempic.  She is aware that pancreatitis is side effect of this medication.  Discussed if she start experiencing any symptoms to stop this medication immediately.  We will try a small dose of medication which is 0.25 mg every 7 days         Relevant Medications    Semaglutide,0.25 or 0.5MG/DOS, (OZEMPIC, 0.25 OR 0.5 MG/DOSE,) 2 MG/3ML Solution Pen-injector    Dyslipidemia associated with type 2 diabetes mellitus (HCC)     Chronic problem, unstable triglyceride, counseled regarding dietary modification.  Recheck labs in 3 months, continue Crestor 20 mg daily         Relevant Medications    Semaglutide,0.25 or 0.5MG/DOS, (OZEMPIC, 0.25 OR 0.5 MG/DOSE,) 2 MG/3ML Solution Pen-injector    Polycythemia     Chronic problem, unstable, this is likely due to dehydration.  Recommended patient to drink more water.  Will recheck labs in the 3-month and if not getting better we will do further workup for this.          Other Visit Diagnoses       Need for vaccination        Relevant Orders    INFLUENZA VACCINE QUAD INJ (PF) (Completed)             Please note that this dictation was created using voice recognition software. I have made every reasonable attempt to correct obvious errors, but I expect that there are errors of grammar and possibly content that I did not discover before finalizing the note.    Follow up in 1 to 2-month for medication follow-up.

## 2023-12-26 NOTE — ASSESSMENT & PLAN NOTE
Chronic problem, unstable, this is likely due to dehydration.  Recommended patient to drink more water.  Will recheck labs in the 3-month and if not getting better we will do further workup for this.

## 2024-02-02 DIAGNOSIS — E11.69 TYPE 2 DIABETES MELLITUS WITH OTHER SPECIFIED COMPLICATION, WITHOUT LONG-TERM CURRENT USE OF INSULIN (HCC): ICD-10-CM

## 2024-02-02 RX ORDER — SEMAGLUTIDE 0.68 MG/ML
0.5 INJECTION, SOLUTION SUBCUTANEOUS
Qty: 3 ML | Refills: 0 | Status: SHIPPED | OUTPATIENT
Start: 2024-02-02 | End: 2024-03-15

## 2024-02-13 ENCOUNTER — OFFICE VISIT (OUTPATIENT)
Dept: MEDICAL GROUP | Facility: MEDICAL CENTER | Age: 49
End: 2024-02-13
Payer: COMMERCIAL

## 2024-02-13 VITALS
WEIGHT: 145.5 LBS | TEMPERATURE: 97.1 F | OXYGEN SATURATION: 97 % | RESPIRATION RATE: 16 BRPM | HEART RATE: 89 BPM | SYSTOLIC BLOOD PRESSURE: 128 MMHG | DIASTOLIC BLOOD PRESSURE: 62 MMHG | BODY MASS INDEX: 24.24 KG/M2 | HEIGHT: 65 IN

## 2024-02-13 DIAGNOSIS — D75.1 POLYCYTHEMIA: ICD-10-CM

## 2024-02-13 DIAGNOSIS — Z11.4 SCREENING FOR HIV (HUMAN IMMUNODEFICIENCY VIRUS): ICD-10-CM

## 2024-02-13 DIAGNOSIS — E11.69 TYPE 2 DIABETES MELLITUS WITH OTHER SPECIFIED COMPLICATION, WITHOUT LONG-TERM CURRENT USE OF INSULIN (HCC): ICD-10-CM

## 2024-02-13 DIAGNOSIS — E11.69 DYSLIPIDEMIA ASSOCIATED WITH TYPE 2 DIABETES MELLITUS (HCC): ICD-10-CM

## 2024-02-13 DIAGNOSIS — E78.5 DYSLIPIDEMIA ASSOCIATED WITH TYPE 2 DIABETES MELLITUS (HCC): ICD-10-CM

## 2024-02-13 PROCEDURE — 3074F SYST BP LT 130 MM HG: CPT | Performed by: FAMILY MEDICINE

## 2024-02-13 PROCEDURE — 3078F DIAST BP <80 MM HG: CPT | Performed by: FAMILY MEDICINE

## 2024-02-13 PROCEDURE — 99214 OFFICE O/P EST MOD 30 MIN: CPT | Performed by: FAMILY MEDICINE

## 2024-02-13 ASSESSMENT — ENCOUNTER SYMPTOMS
CONSTIPATION: 0
NAUSEA: 0
BLOOD IN STOOL: 0
ABDOMINAL PAIN: 0
CHILLS: 0
FEVER: 0
VOMITING: 0
DIARRHEA: 0

## 2024-02-13 ASSESSMENT — PATIENT HEALTH QUESTIONNAIRE - PHQ9: CLINICAL INTERPRETATION OF PHQ2 SCORE: 0

## 2024-02-13 ASSESSMENT — FIBROSIS 4 INDEX: FIB4 SCORE: 0.47

## 2024-02-13 NOTE — ASSESSMENT & PLAN NOTE
Chronic problem, stable, recommended her to continue 0.5 mg Ozempic dose and will increase it to 1 mg dose.  If 1 mg is not effective then we will switch her to Mounjaro.  Recheck labs before next visit.

## 2024-02-13 NOTE — PROGRESS NOTES
FAMILY MEDICINE VISIT                                                               Assessment/Plan:      Problem List Items Addressed This Visit       Type 2 diabetes mellitus with other specified complication (HCC)     Chronic problem, stable, recommended her to continue 0.5 mg Ozempic dose and will increase it to 1 mg dose.  If 1 mg is not effective then we will switch her to Mounjaro.  Recheck labs before next visit.         Relevant Orders    Comp Metabolic Panel    HEMOGLOBIN A1C    VITAMIN B12    Dyslipidemia associated with type 2 diabetes mellitus (HCC)     Chronic problem, unstable, continue Crestor 20 g daily.  Recheck labs.         Relevant Orders    Lipid Profile    Polycythemia     Chronic problem, unstable, she is drinking a lot more water now.  Recheck labs before next visit.         Relevant Orders    CBC WITH DIFFERENTIAL     Other Visit Diagnoses       Screening for HIV (human immunodeficiency virus)        Relevant Orders    HIV AG/AB COMBO ASSAY SCREENING               Follow up in 2 months for medication follow-up.      Chief complaint::Diagnoses of Type 2 diabetes mellitus with other specified complication, without long-term current use of insulin (HCC), Dyslipidemia associated with type 2 diabetes mellitus (HCC), Polycythemia, and Screening for HIV (human immunodeficiency virus) were pertinent to this visit.    History of present illness: Ita Johnson is a 48 y.o. female who presented for medication follow-up.    Problem   Polycythemia    During hospitalization, she had elevated hemoglobin and hematocrit level due to dehydration.  Repeat blood test showed mildly elevated hematocrit level, hemoglobin is in normal range.  Repeat blood test recently showed again elevated hemoglobin and hematocrit.       Type 2 Diabetes Mellitus With Other Specified Complication (Hcc)    Recent A1c came back at 6.0.  Previously she was recently hospitalized for pancreatitis which she said happened due to  Jardiance.  She discontinued Jardiance.  She is on metformin 1000 mg twice daily.  She reports that she is having side effects with metformin  She reported at last visit that her family members on Ozempic and they are doing well with this medication.  Will place her on Ozempic 0.25 mg and then increase to 2.5 mg.  She noticed that she is craving for sugars more , she gained weight.  She started 0.5 mg dose    Lab Results   Component Value Date/Time    HBA1C 6.0 (H) 11/20/2023 06:16 AM           Dyslipidemia Associated With Type 2 Diabetes Mellitus (Hcc)      Recent provide labs showed elevated triglyceride level, low HDL, total cholesterol is low.  On Crestor 20 mg daily  Lab Results   Component Value Date/Time    CHOLSTRLTOT 71 (L) 11/20/2023 0616    TRIGLYCERIDE 276 (H) 11/20/2023 0616    HDL 26 (A) 11/20/2023 0616    LDL see below 11/20/2023 0616                Review of systems:     Review of Systems   Constitutional:  Negative for chills and fever.        Weight gain   Gastrointestinal:  Negative for abdominal pain, blood in stool, constipation, diarrhea, nausea and vomiting.        Medications and Allergies:     Current Outpatient Medications   Medication Sig Dispense Refill    Semaglutide,0.25 or 0.5MG/DOS, (OZEMPIC, 0.25 OR 0.5 MG/DOSE,) 2 MG/3ML Solution Pen-injector Inject 0.5 mg under the skin every 7 days. 3 mL 0    fluticasone (FLONASE) 50 MCG/ACT nasal spray Administer 1 Spray into affected nostril(S) every day. 16 g 0    vitamin D2, Ergocalciferol, (DRISDOL) 1.25 MG (21661 UT) Cap capsule TAKE 1 CAPSULE BY MOUTH EVERY 7 DAYS 12 Capsule 1    losartan-hydrochlorothiazide (HYZAAR) 100-12.5 MG per tablet TAKE 1 TABLET BY MOUTH EVERY DAY 90 Tablet 3    rosuvastatin (CRESTOR) 20 MG Tab Take 1 Tablet by mouth every evening. 90 Tablet 3    triamcinolone acetonide (KENALOG) 0.1 % Cream Apply 1 Application. topically 2 times a day. 45 g 0    Cholecalciferol (D3 PO) Take 2 Capsules by mouth every day. Pt is not  "sure the strength (OTC)      Omega-3 Fatty Acids (FISH OIL PO) Take 2 Capsules by mouth every day.      Ascorbic Acid (VITAMIN C PO) Take 1 Tablet by mouth every day.      Cyanocobalamin (B-12 PO) Take 1 Tablet by mouth every day.      BIOTIN PO Take 1 Tablet by mouth every day.       No current facility-administered medications for this visit.          Vitals:    /62   Pulse 89   Temp 36.2 °C (97.1 °F)   Resp 16   Ht 1.651 m (5' 5\")   Wt 66 kg (145 lb 8.1 oz)   SpO2 97%  Body mass index is 24.21 kg/m².    Physical Exam:     Physical Exam  Constitutional:       Appearance: Normal appearance. She is well-developed and well-groomed.   HENT:      Head: Normocephalic and atraumatic.      Right Ear: External ear normal.      Left Ear: External ear normal.   Eyes:      General:         Right eye: No discharge.         Left eye: No discharge.      Conjunctiva/sclera: Conjunctivae normal.   Cardiovascular:      Rate and Rhythm: Normal rate.   Pulmonary:      Effort: Pulmonary effort is normal. No respiratory distress.   Musculoskeletal:      Cervical back: Neck supple.   Skin:     Findings: No rash.   Neurological:      Mental Status: She is alert.   Psychiatric:         Mood and Affect: Mood and affect normal.         Behavior: Behavior normal.            Please note that this dictation was created using voice recognition software. I have made every reasonable attempt to correct obvious errors, but I expect that there are errors of grammar and possibly content that I did not discover before finalizing the note.      "

## 2024-03-10 RX ORDER — FLUTICASONE PROPIONATE 50 MCG
SPRAY, SUSPENSION (ML) NASAL
Qty: 16 G | Refills: 0 | OUTPATIENT
Start: 2024-03-10

## 2024-03-11 ENCOUNTER — GYNECOLOGY VISIT (OUTPATIENT)
Dept: OBGYN | Facility: CLINIC | Age: 49
End: 2024-03-11
Payer: COMMERCIAL

## 2024-03-11 ENCOUNTER — HOSPITAL ENCOUNTER (OUTPATIENT)
Facility: MEDICAL CENTER | Age: 49
End: 2024-03-11
Attending: OBSTETRICS & GYNECOLOGY
Payer: COMMERCIAL

## 2024-03-11 VITALS — WEIGHT: 145 LBS | SYSTOLIC BLOOD PRESSURE: 133 MMHG | BODY MASS INDEX: 24.13 KG/M2 | DIASTOLIC BLOOD PRESSURE: 82 MMHG

## 2024-03-11 DIAGNOSIS — Z01.419 WOMEN'S ANNUAL ROUTINE GYNECOLOGICAL EXAMINATION: ICD-10-CM

## 2024-03-11 PROCEDURE — 88175 CYTOPATH C/V AUTO FLUID REDO: CPT

## 2024-03-11 PROCEDURE — 3079F DIAST BP 80-89 MM HG: CPT | Performed by: OBSTETRICS & GYNECOLOGY

## 2024-03-11 PROCEDURE — 3075F SYST BP GE 130 - 139MM HG: CPT | Performed by: OBSTETRICS & GYNECOLOGY

## 2024-03-11 PROCEDURE — 99396 PREV VISIT EST AGE 40-64: CPT | Performed by: OBSTETRICS & GYNECOLOGY

## 2024-03-11 PROCEDURE — 87624 HPV HI-RISK TYP POOLED RSLT: CPT

## 2024-03-11 ASSESSMENT — FIBROSIS 4 INDEX: FIB4 SCORE: 0.47

## 2024-03-11 NOTE — PROGRESS NOTES
Annual examination;  This patient is a 48 y.o. female  patient has a history of small uterine fibroids denies abnormal uterine bleeding    Last mammogram--normal    /82 (BP Location: Right arm, Patient Position: Sitting, BP Cuff Size: Small adult)   Wt 145 lb   LMP 2024 (Approximate)   BMI 24.13 kg/m²     Physical examination;  Breast examination- No dominant masses, No skin retraction, No axillary adenopathy    Pelvic examination;  External genitalia-No visible lesions, urethra normal in appearance, Aptos's glands normal  Vagina-No blood or discharge, bladder normal in position without gross lesions  Cervix-No gross lesions, Pap smear taken  Uterus-small uterus but questionable fibroid right lateral side    Adnexa No mass or tenderness    Abdomen-nondistended positive bowel sounds soft nontender without masses or hepatosplenomegaly    Impression;  Normal annual  Uterine fibroids    Plan;  Pelvic ultrasound  Check PAP  Continue annual mammogram    Female chaperone present during entire history and physical examination

## 2024-03-15 DIAGNOSIS — E11.69 TYPE 2 DIABETES MELLITUS WITH OTHER SPECIFIED COMPLICATION, WITHOUT LONG-TERM CURRENT USE OF INSULIN (HCC): ICD-10-CM

## 2024-03-19 LAB
CYTOLOGIST CVX/VAG CYTO: NORMAL
CYTOLOGY CVX/VAG DOC CYTO: NORMAL
CYTOLOGY CVX/VAG DOC THIN PREP: NORMAL
HPV I/H RISK 4 DNA CVX QL PROBE+SIG AMP: NEGATIVE
NOTE NL11727A: NORMAL
OTHER STN SPEC: NORMAL
STAT OF ADQ CVX/VAG CYTO-IMP: NORMAL

## 2024-04-01 ENCOUNTER — APPOINTMENT (OUTPATIENT)
Dept: RADIOLOGY | Facility: MEDICAL CENTER | Age: 49
End: 2024-04-01
Attending: OBSTETRICS & GYNECOLOGY
Payer: COMMERCIAL

## 2024-04-10 ENCOUNTER — HOSPITAL ENCOUNTER (OUTPATIENT)
Dept: LAB | Facility: MEDICAL CENTER | Age: 49
End: 2024-04-10
Attending: FAMILY MEDICINE
Payer: COMMERCIAL

## 2024-04-10 DIAGNOSIS — Z11.4 SCREENING FOR HIV (HUMAN IMMUNODEFICIENCY VIRUS): ICD-10-CM

## 2024-04-10 DIAGNOSIS — D75.1 POLYCYTHEMIA: ICD-10-CM

## 2024-04-10 DIAGNOSIS — E78.5 DYSLIPIDEMIA ASSOCIATED WITH TYPE 2 DIABETES MELLITUS (HCC): ICD-10-CM

## 2024-04-10 DIAGNOSIS — E11.69 DYSLIPIDEMIA ASSOCIATED WITH TYPE 2 DIABETES MELLITUS (HCC): ICD-10-CM

## 2024-04-10 DIAGNOSIS — E11.69 TYPE 2 DIABETES MELLITUS WITH OTHER SPECIFIED COMPLICATION, WITHOUT LONG-TERM CURRENT USE OF INSULIN (HCC): ICD-10-CM

## 2024-04-10 LAB
ALBUMIN SERPL BCP-MCNC: 4.6 G/DL (ref 3.2–4.9)
ALBUMIN/GLOB SERPL: 1.5 G/DL
ALP SERPL-CCNC: 63 U/L (ref 30–99)
ALT SERPL-CCNC: 40 U/L (ref 2–50)
ANION GAP SERPL CALC-SCNC: 11 MMOL/L (ref 7–16)
AST SERPL-CCNC: 25 U/L (ref 12–45)
BASOPHILS # BLD AUTO: 0.6 % (ref 0–1.8)
BASOPHILS # BLD: 0.05 K/UL (ref 0–0.12)
BILIRUB SERPL-MCNC: 0.2 MG/DL (ref 0.1–1.5)
BUN SERPL-MCNC: 14 MG/DL (ref 8–22)
CALCIUM ALBUM COR SERPL-MCNC: 8.7 MG/DL (ref 8.5–10.5)
CALCIUM SERPL-MCNC: 9.2 MG/DL (ref 8.5–10.5)
CHLORIDE SERPL-SCNC: 103 MMOL/L (ref 96–112)
CHOLEST SERPL-MCNC: 61 MG/DL (ref 100–199)
CO2 SERPL-SCNC: 27 MMOL/L (ref 20–33)
CREAT SERPL-MCNC: 0.37 MG/DL (ref 0.5–1.4)
EOSINOPHIL # BLD AUTO: 0.21 K/UL (ref 0–0.51)
EOSINOPHIL NFR BLD: 2.5 % (ref 0–6.9)
ERYTHROCYTE [DISTWIDTH] IN BLOOD BY AUTOMATED COUNT: 48.3 FL (ref 35.9–50)
EST. AVERAGE GLUCOSE BLD GHB EST-MCNC: 117 MG/DL
GFR SERPLBLD CREATININE-BSD FMLA CKD-EPI: 125 ML/MIN/1.73 M 2
GLOBULIN SER CALC-MCNC: 3 G/DL (ref 1.9–3.5)
GLUCOSE SERPL-MCNC: 107 MG/DL (ref 65–99)
HBA1C MFR BLD: 5.7 % (ref 4–5.6)
HCT VFR BLD AUTO: 46 % (ref 37–47)
HDLC SERPL-MCNC: 25 MG/DL
HGB BLD-MCNC: 15.6 G/DL (ref 12–16)
HIV 1+2 AB+HIV1 P24 AG SERPL QL IA: NORMAL
IMM GRANULOCYTES # BLD AUTO: 0.01 K/UL (ref 0–0.11)
IMM GRANULOCYTES NFR BLD AUTO: 0.1 % (ref 0–0.9)
LDLC SERPL CALC-MCNC: 7 MG/DL
LYMPHOCYTES # BLD AUTO: 2.88 K/UL (ref 1–4.8)
LYMPHOCYTES NFR BLD: 34.8 % (ref 22–41)
MCH RBC QN AUTO: 30.9 PG (ref 27–33)
MCHC RBC AUTO-ENTMCNC: 33.9 G/DL (ref 32.2–35.5)
MCV RBC AUTO: 91.1 FL (ref 81.4–97.8)
MONOCYTES # BLD AUTO: 0.66 K/UL (ref 0–0.85)
MONOCYTES NFR BLD AUTO: 8 % (ref 0–13.4)
NEUTROPHILS # BLD AUTO: 4.46 K/UL (ref 1.82–7.42)
NEUTROPHILS NFR BLD: 54 % (ref 44–72)
NRBC # BLD AUTO: 0 K/UL
NRBC BLD-RTO: 0 /100 WBC (ref 0–0.2)
PLATELET # BLD AUTO: 333 K/UL (ref 164–446)
PMV BLD AUTO: 8.8 FL (ref 9–12.9)
POTASSIUM SERPL-SCNC: 3.6 MMOL/L (ref 3.6–5.5)
PROT SERPL-MCNC: 7.6 G/DL (ref 6–8.2)
RBC # BLD AUTO: 5.05 M/UL (ref 4.2–5.4)
SODIUM SERPL-SCNC: 141 MMOL/L (ref 135–145)
TRIGL SERPL-MCNC: 146 MG/DL (ref 0–149)
VIT B12 SERPL-MCNC: 990 PG/ML (ref 211–911)
WBC # BLD AUTO: 8.3 K/UL (ref 4.8–10.8)

## 2024-04-10 PROCEDURE — 82607 VITAMIN B-12: CPT

## 2024-04-10 PROCEDURE — 80061 LIPID PANEL: CPT

## 2024-04-10 PROCEDURE — 87389 HIV-1 AG W/HIV-1&-2 AB AG IA: CPT

## 2024-04-10 PROCEDURE — 80053 COMPREHEN METABOLIC PANEL: CPT

## 2024-04-10 PROCEDURE — 36415 COLL VENOUS BLD VENIPUNCTURE: CPT

## 2024-04-10 PROCEDURE — 83036 HEMOGLOBIN GLYCOSYLATED A1C: CPT

## 2024-04-10 PROCEDURE — 85025 COMPLETE CBC W/AUTO DIFF WBC: CPT

## 2024-04-17 ENCOUNTER — APPOINTMENT (OUTPATIENT)
Dept: MEDICAL GROUP | Facility: MEDICAL CENTER | Age: 49
End: 2024-04-17
Payer: COMMERCIAL

## 2024-04-17 ENCOUNTER — OFFICE VISIT (OUTPATIENT)
Dept: MEDICAL GROUP | Facility: MEDICAL CENTER | Age: 49
End: 2024-04-17
Payer: COMMERCIAL

## 2024-04-17 VITALS
RESPIRATION RATE: 18 BRPM | OXYGEN SATURATION: 98 % | SYSTOLIC BLOOD PRESSURE: 128 MMHG | HEIGHT: 65 IN | HEART RATE: 93 BPM | TEMPERATURE: 97.5 F | WEIGHT: 140.6 LBS | BODY MASS INDEX: 23.43 KG/M2 | DIASTOLIC BLOOD PRESSURE: 74 MMHG

## 2024-04-17 DIAGNOSIS — F17.200 CURRENT EVERY DAY SMOKER: ICD-10-CM

## 2024-04-17 DIAGNOSIS — I10 PRIMARY HYPERTENSION: ICD-10-CM

## 2024-04-17 DIAGNOSIS — E11.69 TYPE 2 DIABETES MELLITUS WITH OTHER SPECIFIED COMPLICATION, WITHOUT LONG-TERM CURRENT USE OF INSULIN (HCC): ICD-10-CM

## 2024-04-17 DIAGNOSIS — E11.69 DYSLIPIDEMIA ASSOCIATED WITH TYPE 2 DIABETES MELLITUS (HCC): ICD-10-CM

## 2024-04-17 DIAGNOSIS — E78.5 DYSLIPIDEMIA ASSOCIATED WITH TYPE 2 DIABETES MELLITUS (HCC): ICD-10-CM

## 2024-04-17 DIAGNOSIS — D75.1 POLYCYTHEMIA: ICD-10-CM

## 2024-04-17 PROCEDURE — 99214 OFFICE O/P EST MOD 30 MIN: CPT | Performed by: NURSE PRACTITIONER

## 2024-04-17 PROCEDURE — 3078F DIAST BP <80 MM HG: CPT | Performed by: NURSE PRACTITIONER

## 2024-04-17 PROCEDURE — 3074F SYST BP LT 130 MM HG: CPT | Performed by: NURSE PRACTITIONER

## 2024-04-17 ASSESSMENT — ENCOUNTER SYMPTOMS
PALPITATIONS: 0
FEVER: 0
CHILLS: 0

## 2024-04-17 ASSESSMENT — FIBROSIS 4 INDEX: FIB4 SCORE: 0.56

## 2024-04-17 NOTE — ASSESSMENT & PLAN NOTE
The patient's condition is a chronic and stable condition. . She is currently on a regimen of rosuvastatin 20 mg, tolerating well, with no refills required at this time. The current treatment plan will be continued.

## 2024-04-17 NOTE — ASSESSMENT & PLAN NOTE
Type 2 Diabetes Mellitus.  The patient's condition is a chronic and stable condition. Her condition is well-managed with semaglutide, which she is tolerating well. No refills are required at this time. Previously on Metformin.

## 2024-04-17 NOTE — PROGRESS NOTES
Patient agreed to use of ROSAS: yes  Chief Complaint   Patient presents with    Follow-Up     Lab f/u        HISTORY OF PRESENT ILLNESS: Patient is a pleasant 48 y.o. female, established patient who presents today to discuss medical problems as listed below:    Assessment/Plan:    Ita was seen today for follow-up.    Diagnoses and all orders for this visit:    Type 2 diabetes mellitus with other specified complication, without long-term current use of insulin (HCC)    Dyslipidemia associated with type 2 diabetes mellitus (HCC)    Primary hypertension    Polycythemia    Current every day smoker              Assessment & Plan  1. Type 2 Diabetes Mellitus.  The patient's condition is a chronic and stable condition. Her condition is well-managed with semaglutide, which she is tolerating well. No refills are required at this time. Previously on Metformin.    2. Dyslipidemia.  The patient's condition is a chronic and stable condition. . She is currently on a regimen of rosuvastatin 20 mg, tolerating well, with no refills required at this time. The current treatment plan will be continued.    3. Hypertension.  The patient's condition is chronic and stable. Her blood pressure reading today is 128/74. She does not require any refills at this time. The current treatment plan will be continued, recent GFR level of 125 from 4/2024.    4. Polycythemia  Chronic and stable condition.  Current hemoglobin is 15.6/46.0  Risk factors include smoking and living at evaluation.  She years ago she attempted to smoke and was prescribed Chantix which she did not tolerate.  At this time she is not ready to quit smoking .  Patient has been counseled on smoking cessation.  She will return to see her PCP once she is ready to quit.    5.  Current everyday smoker  Chronic condition.  Patient counseled on smoking cessation.  She has been smoking since age of 19.  26 pack years.  Currently smoking 1 pack daily.  Attempted to quit 2 years ago, right  "Chantix and did not tolerate.  At this time she is not ready to quit as she is under a lot of stress.  She will return to talk to her PCP when ready to quit.    History of Present Illness  The patient is here to discuss her lab results. The patient has been presented via Zoom.    The patient's medication regimen was transitioned from metformin to Ozempic, which effectively managed her condition. The metformin was causing numerous pimples and hair loss, prompting her to seek medical attention. She has been tolerating the medication well, with no reported nausea. She recently refilled her Ozempic prescription.    The patient is currently on a regimen of rosuvastatin 20 mg for cholesterol management, with no recent changes to the dosage. She has made dietary modifications, incorporating more chicken and vegetables into her diet, excluding red meat. She consumes a significant amount of nuts and experiences pimples when consuming peanuts.    The patient expresses concern about her iron levels.    The patient, a tobacco user, began smoking at the age of 19, consuming one pack per day. She was prescribed Chantix approximately 2 to 3 years ago for smoking cessation, which she tolerated well but induced depression. She expresses a desire to quit smoking during her next appointment with Dr. Foreman. Currently, she is under significant stress due to recent relocation to a new home and her brother's [facility] admission.      Allergies, past medical history, past surgical history, family history, social history reviewed and updated.    Review of Systems   Constitutional:  Negative for chills, fever and malaise/fatigue.   Cardiovascular:  Negative for chest pain, palpitations and leg swelling.        Exam:    /74 (BP Location: Left arm, Patient Position: Sitting, BP Cuff Size: Adult)   Pulse 93   Temp 36.4 °C (97.5 °F) (Temporal)   Resp 18   Ht 1.651 m (5' 5\")   Wt 63.8 kg (140 lb 9.6 oz)   SpO2 98%   BMI 23.40 kg/m²  Body " mass index is 23.4 kg/m².    Physical Exam  Constitutional:       General: She is not in acute distress.     Appearance: She is not ill-appearing.   HENT:      Head: Normocephalic and atraumatic.      Nose: Nose normal.   Eyes:      General:         Right eye: No discharge.         Left eye: No discharge.   Cardiovascular:      Rate and Rhythm: Normal rate.      Pulses: Normal pulses.      Heart sounds: Normal heart sounds. No murmur heard.  Pulmonary:      Effort: Pulmonary effort is normal. No respiratory distress.      Breath sounds: Normal breath sounds. No stridor. No wheezing or rales.   Skin:     Findings: No rash.   Neurological:      General: No focal deficit present.      Mental Status: She is alert. Mental status is at baseline.   Psychiatric:         Mood and Affect: Mood normal.         Behavior: Behavior normal.          Results  Laboratory Studies  A1c is 5.7. Fasting sugars 107. Triglycerides 146. HDL 25.       Discussed with patient possible alternative diagnoses, patient is to take all medications as prescribed.      If symptoms persist FU w/PCP, if symptoms worsen go to emergency room.      If experiencing any side effects from prescribed medications report to the office immediately or go to emergency room.     Reviewed indication, dosage, usage and potential adverse effects of prescribed medications.      Reviewed risks and benefits of treatment plan. Patient verbalizes understanding of all instruction and verbally agrees to plan.     Discussed plan with the patient, and patient agrees to the above.      I personally reviewed prior external notes and test results pertinent to today's visit.      No follow-ups on file. Pt to schedule f/u appt with PCP

## 2024-04-17 NOTE — ASSESSMENT & PLAN NOTE
Chronic and stable condition.  Current hemoglobin is 15.6/46.0  Risk factors include smoking and living at evaluation.  She years ago she attempted to smoke and was prescribed Chantix which she did not tolerate.  At this time she is not ready to quit smoking .  Patient has been counseled on smoking cessation.  She will return to see her PCP once she is ready to quit.

## 2024-04-17 NOTE — ASSESSMENT & PLAN NOTE
The patient's condition is chronic and stable. Her blood pressure reading today is 128/74. She does not require any refills at this time. The current treatment plan will be continued, recent GFR level of 125 from 4/2024.

## 2024-04-17 NOTE — ASSESSMENT & PLAN NOTE
Chronic condition.  Patient counseled on smoking cessation.  She has been smoking since age of 19.  26 pack years.  Currently smoking 1 pack daily.  Attempted to quit 2 years ago, right Chantix and did not tolerate.  At this time she is not ready to quit as she is under a lot of stress.  She will return to talk to her PCP when ready to quit.

## 2024-05-06 ENCOUNTER — HOSPITAL ENCOUNTER (OUTPATIENT)
Dept: RADIOLOGY | Facility: MEDICAL CENTER | Age: 49
End: 2024-05-06
Attending: OBSTETRICS & GYNECOLOGY
Payer: COMMERCIAL

## 2024-05-06 DIAGNOSIS — Z01.419 WOMEN'S ANNUAL ROUTINE GYNECOLOGICAL EXAMINATION: ICD-10-CM

## 2024-05-10 DIAGNOSIS — E11.69 DYSLIPIDEMIA ASSOCIATED WITH TYPE 2 DIABETES MELLITUS (HCC): ICD-10-CM

## 2024-05-10 DIAGNOSIS — E55.9 VITAMIN D DEFICIENCY: ICD-10-CM

## 2024-05-10 DIAGNOSIS — E78.5 DYSLIPIDEMIA ASSOCIATED WITH TYPE 2 DIABETES MELLITUS (HCC): ICD-10-CM

## 2024-05-10 RX ORDER — ERGOCALCIFEROL 1.25 MG/1
50000 CAPSULE ORAL
Qty: 12 CAPSULE | Refills: 1 | Status: SHIPPED | OUTPATIENT
Start: 2024-05-10

## 2024-05-10 RX ORDER — ROSUVASTATIN CALCIUM 20 MG/1
20 TABLET, COATED ORAL EVERY EVENING
Qty: 90 TABLET | Refills: 3 | Status: SHIPPED | OUTPATIENT
Start: 2024-05-10 | End: 2024-05-22

## 2024-05-10 NOTE — TELEPHONE ENCOUNTER
Received request via: Patient    Was the patient seen in the last year in this department? Yes    Does the patient have an active prescription (recently filled or refills available) for medication(s) requested? No    Pharmacy Name:   [id number] - [place], [place] - [address] [facility] [address] [place]  [address] [address]  Phone: [phone] Fax: [phone]        Does the patient have halfway Plus and need 100 day supply (blood pressure, diabetes and cholesterol meds only)? Patient does not have SCP

## 2024-05-13 ENCOUNTER — HOSPITAL ENCOUNTER (OUTPATIENT)
Dept: RADIOLOGY | Facility: MEDICAL CENTER | Age: 49
End: 2024-05-13
Attending: OBSTETRICS & GYNECOLOGY
Payer: COMMERCIAL

## 2024-05-13 DIAGNOSIS — Z01.419 WOMEN'S ANNUAL ROUTINE GYNECOLOGICAL EXAMINATION: ICD-10-CM

## 2024-05-22 ENCOUNTER — TELEMEDICINE (OUTPATIENT)
Dept: MEDICAL GROUP | Facility: MEDICAL CENTER | Age: 49
End: 2024-05-22
Payer: COMMERCIAL

## 2024-05-22 VITALS — WEIGHT: 140 LBS | BODY MASS INDEX: 23.32 KG/M2 | HEIGHT: 65 IN

## 2024-05-22 DIAGNOSIS — R51.9 ACUTE NONINTRACTABLE HEADACHE, UNSPECIFIED HEADACHE TYPE: ICD-10-CM

## 2024-05-22 DIAGNOSIS — E11.69 DYSLIPIDEMIA ASSOCIATED WITH TYPE 2 DIABETES MELLITUS (HCC): ICD-10-CM

## 2024-05-22 DIAGNOSIS — E78.5 DYSLIPIDEMIA ASSOCIATED WITH TYPE 2 DIABETES MELLITUS (HCC): ICD-10-CM

## 2024-05-22 DIAGNOSIS — E11.69 TYPE 2 DIABETES MELLITUS WITH OTHER SPECIFIED COMPLICATION, WITHOUT LONG-TERM CURRENT USE OF INSULIN (HCC): ICD-10-CM

## 2024-05-22 PROCEDURE — 99214 OFFICE O/P EST MOD 30 MIN: CPT | Performed by: FAMILY MEDICINE

## 2024-05-22 RX ORDER — ROSUVASTATIN CALCIUM 5 MG/1
5 TABLET, COATED ORAL EVERY EVENING
Qty: 30 TABLET | Refills: 11 | Status: SHIPPED | OUTPATIENT
Start: 2024-05-22 | End: 2024-05-22 | Stop reason: SDUPTHER

## 2024-05-22 RX ORDER — SEMAGLUTIDE 0.68 MG/ML
0.5 INJECTION, SOLUTION SUBCUTANEOUS
Qty: 9 ML | Refills: 3 | Status: SHIPPED | OUTPATIENT
Start: 2024-05-22

## 2024-05-22 RX ORDER — ROSUVASTATIN CALCIUM 5 MG/1
5 TABLET, COATED ORAL EVERY EVENING
Qty: 90 TABLET | Refills: 3 | Status: SHIPPED | OUTPATIENT
Start: 2024-05-22

## 2024-05-22 ASSESSMENT — ENCOUNTER SYMPTOMS
CHILLS: 0
FEVER: 0
HEADACHES: 1
TINGLING: 0
PALPITATIONS: 0
FOCAL WEAKNESS: 0
SENSORY CHANGE: 0
TREMORS: 0
SPEECH CHANGE: 0
WEAKNESS: 0

## 2024-05-22 ASSESSMENT — FIBROSIS 4 INDEX: FIB4 SCORE: 0.56

## 2024-05-22 NOTE — PROGRESS NOTES
Virtual Visit: Established Patient   This visit was conducted via Zoom using secure and encrypted videoconferencing technology.   The patient was in their home in the [facility].    The patient's identity was confirmed and verbal consent was obtained for this virtual visit.     Verbal consent was acquired by the patient to use QRGL ambient listening note generation during this visit.      Ita was seen today for migraine.    Diagnoses and all orders for this visit:    Type 2 diabetes mellitus with other specified complication, without long-term current use of insulin (HCC)  -     Semaglutide,0.25 or 0.5MG/DOS, (OZEMPIC, 0.25 OR 0.5 MG/DOSE,) 2 MG/3ML Solution Pen-injector; Inject 0.5 mg under the skin every 7 days.    Dyslipidemia associated with type 2 diabetes mellitus (HCC)  -     Discontinue: rosuvastatin (CRESTOR) 5 MG Tab; Take 1 Tablet by mouth every evening.  -     rosuvastatin (CRESTOR) 5 MG Tab; Take 1 Tablet by mouth every evening.    Acute nonintractable headache, unspecified headache type  -     CT-HEAD W/O; Future                  Assessment & Plan  1. Acute headache.  This is a new and unstable condition, potentially attributable to medication side effects. The dosage of Crestor and Ozempic will be reduced. The patient is advised to closely monitor her blood pressure at home. A CT scan of the head will be conducted. She is to persist with her current regimen of multivitamins, vitamin B12, and vitamin D3. The patient is also advised to increase her fluid intake.    2. Type 2 diabetes mellitus.  This is a chronic condition, currently stable with an A1c level of 5.7. She is experiencing symptoms of headache and body aches. The dosage of Ozempic will be reduced to 0.5 mg every 7 days.    3. Dyslipidemia.  This is a chronic, over controlled condition. Her LDL and total cholesterol levels are significantly low. Crestor dosage will be reduced to 5 mg daily.    Follow-up  The patient is  scheduled for a follow-up visit in 1 month for a CT scan of the head, headache follow-up, and medication review. A foot examination will be performed during that visit.        Chief complaint::Diagnoses of Type 2 diabetes mellitus with other specified complication, without long-term current use of insulin (HCC), Dyslipidemia associated with type 2 diabetes mellitus (HCC), and Acute nonintractable headache, unspecified headache type were pertinent to this visit.      History of Present Illness  The patient is a 48-year-old female here for migraine headaches and body aches.    The patient relocated to a new residence at the end of 03/2024 and began experiencing symptoms between 04/08/2024 and 05/2024. Upon awakening, she experiences generalized headaches that persist throughout the day. Despite these symptoms, her blood pressure remained stable, and she has been self-monitoring it. She has attempted to manage her symptoms with Tylenol, taking up to 6 tablets daily. Concurrently, she has been experiencing generalized body aches. Despite testing for COVID-19, her symptoms persist. She recently underwent blood tests and consulted with a different provider but no modifications were made to her medication regimen. She took Ozempic the previous night and has a remaining supply for 2 weeks. She has increased her water intake over the past week. She resides in a new residence and denies any exposure to new paint, chemicals, or dust. She denies any allergies or nasal congestion. She denies any head trauma. Her last eye examination was conducted in 06/2023, with a follow-up appointment scheduled for the following month.      Review of Systems   Constitutional:  Negative for chills and fever.   Cardiovascular:  Negative for chest pain, palpitations and leg swelling.   Neurological:  Positive for headaches. Negative for tingling, tremors, sensory change, speech change, focal weakness and weakness.          Medications and  "Allergies:     Current Outpatient Medications   Medication Sig Dispense Refill    Semaglutide,0.25 or 0.5MG/DOS, (OZEMPIC, 0.25 OR 0.5 MG/DOSE,) 2 MG/3ML Solution Pen-injector Inject 0.5 mg under the skin every 7 days. 9 mL 3    rosuvastatin (CRESTOR) 5 MG Tab Take 1 Tablet by mouth every evening. 90 Tablet 3    vitamin D2, Ergocalciferol, (DRISDOL) 1.25 MG (15927 UT) Cap capsule TAKE 1 CAPSULE BY MOUTH EVERY 7 DAYS 12 Capsule 1    fluticasone (FLONASE) 50 MCG/ACT nasal spray Administer 1 Spray into affected nostril(S) every day. 16 g 0    losartan-hydrochlorothiazide (HYZAAR) 100-12.5 MG per tablet TAKE 1 TABLET BY MOUTH EVERY DAY 90 Tablet 3    triamcinolone acetonide (KENALOG) 0.1 % Cream Apply 1 Application. topically 2 times a day. 45 g 0    Cholecalciferol (D3 PO) Take 2 Capsules by mouth every day. Pt is not sure the strength (OTC)      Omega-3 Fatty Acids (FISH OIL PO) Take 2 Capsules by mouth every day.      Ascorbic Acid (VITAMIN C PO) Take 1 Tablet by mouth every day.      Cyanocobalamin (B-12 PO) Take 1 Tablet by mouth every day.      BIOTIN PO Take 1 Tablet by mouth every day.       No current facility-administered medications for this visit.       Ht 1.651 m (5' 5\")   Wt 63.5 kg (140 lb) , Body mass index is 23.3 kg/m².      Physical Exam:  Constitutional: Alert, no distress, well-groomed.  Skin: No rashes in visible areas.  Eye: Round. Conjunctiva clear, lids normal. No icterus.   ENMT: Lips pink without lesions, good dentition, moist mucous membranes. Phonation normal.  Neck: No masses, no thyromegaly. Moves freely without pain.  Respiratory: Unlabored respiratory effort, no cough or audible wheeze  Psych: Alert, normal affect and mood.       Results  Laboratory Studies  A1c is 5.7. Cholesterol levels are very low.          Please note that this dictation was created using voice recognition software. I have made every reasonable attempt to correct obvious errors, but I expect that there are errors " of grammar and possibly content that I did not discover before finalizing the note.

## 2024-06-17 ENCOUNTER — APPOINTMENT (OUTPATIENT)
Dept: RADIOLOGY | Facility: MEDICAL CENTER | Age: 49
End: 2024-06-17
Attending: FAMILY MEDICINE
Payer: COMMERCIAL

## 2024-06-24 ENCOUNTER — HOSPITAL ENCOUNTER (OUTPATIENT)
Dept: RADIOLOGY | Facility: MEDICAL CENTER | Age: 49
End: 2024-06-24
Attending: FAMILY MEDICINE
Payer: COMMERCIAL

## 2024-06-24 DIAGNOSIS — R51.9 ACUTE NONINTRACTABLE HEADACHE, UNSPECIFIED HEADACHE TYPE: ICD-10-CM

## 2024-06-24 PROCEDURE — 70450 CT HEAD/BRAIN W/O DYE: CPT

## 2024-09-03 ENCOUNTER — APPOINTMENT (OUTPATIENT)
Dept: MEDICAL GROUP | Facility: MEDICAL CENTER | Age: 49
End: 2024-09-03
Payer: COMMERCIAL

## 2024-09-03 VITALS — HEIGHT: 65 IN | WEIGHT: 141 LBS | BODY MASS INDEX: 23.49 KG/M2

## 2024-09-03 DIAGNOSIS — E11.69 TYPE 2 DIABETES MELLITUS WITH OTHER SPECIFIED COMPLICATION, WITHOUT LONG-TERM CURRENT USE OF INSULIN (HCC): ICD-10-CM

## 2024-09-03 DIAGNOSIS — E78.5 DYSLIPIDEMIA ASSOCIATED WITH TYPE 2 DIABETES MELLITUS (HCC): ICD-10-CM

## 2024-09-03 DIAGNOSIS — E11.69 DYSLIPIDEMIA ASSOCIATED WITH TYPE 2 DIABETES MELLITUS (HCC): ICD-10-CM

## 2024-09-03 DIAGNOSIS — E55.9 VITAMIN D DEFICIENCY: ICD-10-CM

## 2024-09-03 DIAGNOSIS — D75.1 POLYCYTHEMIA: ICD-10-CM

## 2024-09-03 PROCEDURE — 99214 OFFICE O/P EST MOD 30 MIN: CPT | Performed by: FAMILY MEDICINE

## 2024-09-03 ASSESSMENT — ENCOUNTER SYMPTOMS
PALPITATIONS: 0
CHILLS: 0
FEVER: 0

## 2024-09-03 ASSESSMENT — FIBROSIS 4 INDEX: FIB4 SCORE: 0.57

## 2024-09-03 NOTE — PROGRESS NOTES
Virtual Visit: Established Patient   This visit was conducted via Teams using secure and encrypted videoconferencing technology.   The patient was in their home in the state [facility].    The patient's identity was confirmed and verbal consent was obtained for this virtual visit.     Verbal consent was acquired by the patient to use [employer] ambient listening note generation during this visit.      Ita was seen today for follow-up.    Diagnoses and all orders for this visit:    Dyslipidemia associated with type 2 diabetes mellitus (HCC)  -     Lipid Profile; Future    Vitamin D deficiency  -     VITAMIN D,25 HYDROXY (DEFICIENCY); Future    Polycythemia  -     CBC WITH DIFFERENTIAL; Future    Type 2 diabetes mellitus with other specified complication, without long-term current use of insulin (HCC)  -     Comp Metabolic Panel; Future  -     HEMOGLOBIN A1C; Future  -     MICROALBUMIN CREAT RATIO URINE; Future  -     VITAMIN B12; Future                  Assessment & Plan  1. Type 2 Diabetes Mellitus.  Chronic condition, stable, she is currently using Ozempic 0.5 mg every 7 days for blood sugar control with no reported side effects. A comprehensive blood test will be ordered to monitor her A1c levels. Based on the results, medication adjustments may be considered.    2. Hyperlipidemia.  Chronic condition, stable, A comprehensive blood test will be ordered to monitor her cholesterol levels. Based on the results, medication adjustments may be considered.      3.  Polycythemia vitamin D deficiency  Chronic condition, stable, recheck labs.    4. Health Maintenance.  She is due for a foot examination and other routine check-ups. She has received two doses of the COVID-19 vaccine but has not yet had the booster shot. She is advised to receive her influenza and COVID-19 vaccines at a local pharmacy.    Follow-up  The patient will follow up in 1 month.        Chief complaint::Diagnoses of Dyslipidemia associated with type  2 diabetes mellitus (HCC), Vitamin D deficiency, Polycythemia, and Type 2 diabetes mellitus with other specified complication, without long-term current use of insulin (HCC) were pertinent to this visit.      History of Present Illness  The patient is a 49-year-old female who presents via virtual visit for follow-up.    She reports no issues with her current medication regimen, which includes Ozempic 0.5 mg administered every 7 days to manage her blood sugar levels. She confirms that she has been adhering to all prescribed medications and has not experienced any side effects.      Review of Systems   Constitutional:  Negative for chills and fever.   Cardiovascular:  Negative for chest pain, palpitations and leg swelling.          Medications and Allergies:     Current Outpatient Medications   Medication Sig Dispense Refill    Semaglutide,0.25 or 0.5MG/DOS, (OZEMPIC, 0.25 OR 0.5 MG/DOSE,) 2 MG/3ML Solution Pen-injector Inject 0.5 mg under the skin every 7 days. 9 mL 3    rosuvastatin (CRESTOR) 5 MG Tab Take 1 Tablet by mouth every evening. 90 Tablet 3    vitamin D2, Ergocalciferol, (DRISDOL) 1.25 MG (78109 UT) Cap capsule TAKE 1 CAPSULE BY MOUTH EVERY 7 DAYS 12 Capsule 1    fluticasone (FLONASE) 50 MCG/ACT nasal spray Administer 1 Spray into affected nostril(S) every day. 16 g 0    losartan-hydrochlorothiazide (HYZAAR) 100-12.5 MG per tablet TAKE 1 TABLET BY MOUTH EVERY DAY 90 Tablet 3    triamcinolone acetonide (KENALOG) 0.1 % Cream Apply 1 Application. topically 2 times a day. 45 g 0    Cholecalciferol (D3 PO) Take 2 Capsules by mouth every day. Pt is not sure the strength (OTC)      Omega-3 Fatty Acids (FISH OIL PO) Take 2 Capsules by mouth every day.      Ascorbic Acid (VITAMIN C PO) Take 1 Tablet by mouth every day.      Cyanocobalamin (B-12 PO) Take 1 Tablet by mouth every day.      BIOTIN PO Take 1 Tablet by mouth every day.       No current facility-administered medications for this visit.       Ht 1.651 m  "(5' 5\")   Wt 64 kg (141 lb) , Body mass index is 23.46 kg/m².      Physical Exam:  Constitutional: Alert, no distress, well-groomed.  Skin: No rashes in visible areas.  Eye: Round. Conjunctiva clear, lids normal. No icterus.   ENMT: Lips pink without lesions, good dentition, moist mucous membranes. Phonation normal.  Neck: No masses, no thyromegaly. Moves freely without pain.  Respiratory: Unlabored respiratory effort, no cough or audible wheeze  Psych: Alert, normal affect and mood.       Results        Please note that this dictation was created using voice recognition software. I have made every reasonable attempt to correct obvious errors, but I expect that there are errors of grammar and possibly content that I did not discover before finalizing the note.    "

## 2024-10-07 ENCOUNTER — HOSPITAL ENCOUNTER (OUTPATIENT)
Dept: LAB | Facility: MEDICAL CENTER | Age: 49
End: 2024-10-07
Attending: FAMILY MEDICINE
Payer: COMMERCIAL

## 2024-10-07 DIAGNOSIS — E11.69 DYSLIPIDEMIA ASSOCIATED WITH TYPE 2 DIABETES MELLITUS (HCC): ICD-10-CM

## 2024-10-07 DIAGNOSIS — E55.9 VITAMIN D DEFICIENCY: ICD-10-CM

## 2024-10-07 DIAGNOSIS — D75.1 POLYCYTHEMIA: ICD-10-CM

## 2024-10-07 DIAGNOSIS — E11.69 TYPE 2 DIABETES MELLITUS WITH OTHER SPECIFIED COMPLICATION, WITHOUT LONG-TERM CURRENT USE OF INSULIN (HCC): ICD-10-CM

## 2024-10-07 DIAGNOSIS — E78.5 DYSLIPIDEMIA ASSOCIATED WITH TYPE 2 DIABETES MELLITUS (HCC): ICD-10-CM

## 2024-10-07 LAB
25(OH)D3 SERPL-MCNC: 37 NG/ML (ref 30–100)
ALBUMIN SERPL BCP-MCNC: 4.2 G/DL (ref 3.2–4.9)
ALBUMIN/GLOB SERPL: 1.2 G/DL
ALP SERPL-CCNC: 75 U/L (ref 30–99)
ALT SERPL-CCNC: 50 U/L (ref 2–50)
ANION GAP SERPL CALC-SCNC: 13 MMOL/L (ref 7–16)
AST SERPL-CCNC: 36 U/L (ref 12–45)
BASOPHILS # BLD AUTO: 0.6 % (ref 0–1.8)
BASOPHILS # BLD: 0.06 K/UL (ref 0–0.12)
BILIRUB SERPL-MCNC: 0.4 MG/DL (ref 0.1–1.5)
BUN SERPL-MCNC: 14 MG/DL (ref 8–22)
CALCIUM ALBUM COR SERPL-MCNC: 9.4 MG/DL (ref 8.5–10.5)
CALCIUM SERPL-MCNC: 9.6 MG/DL (ref 8.5–10.5)
CHLORIDE SERPL-SCNC: 101 MMOL/L (ref 96–112)
CHOLEST SERPL-MCNC: 86 MG/DL (ref 100–199)
CO2 SERPL-SCNC: 25 MMOL/L (ref 20–33)
CREAT SERPL-MCNC: 0.4 MG/DL (ref 0.5–1.4)
CREAT UR-MCNC: 54.5 MG/DL
EOSINOPHIL # BLD AUTO: 0.17 K/UL (ref 0–0.51)
EOSINOPHIL NFR BLD: 1.7 % (ref 0–6.9)
ERYTHROCYTE [DISTWIDTH] IN BLOOD BY AUTOMATED COUNT: 49.1 FL (ref 35.9–50)
EST. AVERAGE GLUCOSE BLD GHB EST-MCNC: 120 MG/DL
FASTING STATUS PATIENT QL REPORTED: NORMAL
GFR SERPLBLD CREATININE-BSD FMLA CKD-EPI: 122 ML/MIN/1.73 M 2
GLOBULIN SER CALC-MCNC: 3.6 G/DL (ref 1.9–3.5)
GLUCOSE SERPL-MCNC: 106 MG/DL (ref 65–99)
HBA1C MFR BLD: 5.8 % (ref 4–5.6)
HCT VFR BLD AUTO: 46.4 % (ref 37–47)
HDLC SERPL-MCNC: 25 MG/DL
HGB BLD-MCNC: 15.8 G/DL (ref 12–16)
IMM GRANULOCYTES # BLD AUTO: 0.05 K/UL (ref 0–0.11)
IMM GRANULOCYTES NFR BLD AUTO: 0.5 % (ref 0–0.9)
LDLC SERPL CALC-MCNC: 16 MG/DL
LYMPHOCYTES # BLD AUTO: 2.95 K/UL (ref 1–4.8)
LYMPHOCYTES NFR BLD: 30.2 % (ref 22–41)
MCH RBC QN AUTO: 31.6 PG (ref 27–33)
MCHC RBC AUTO-ENTMCNC: 34.1 G/DL (ref 32.2–35.5)
MCV RBC AUTO: 92.8 FL (ref 81.4–97.8)
MICROALBUMIN UR-MCNC: 1.9 MG/DL
MICROALBUMIN/CREAT UR: 35 MG/G (ref 0–30)
MONOCYTES # BLD AUTO: 0.83 K/UL (ref 0–0.85)
MONOCYTES NFR BLD AUTO: 8.5 % (ref 0–13.4)
NEUTROPHILS # BLD AUTO: 5.7 K/UL (ref 1.82–7.42)
NEUTROPHILS NFR BLD: 58.5 % (ref 44–72)
NRBC # BLD AUTO: 0 K/UL
NRBC BLD-RTO: 0 /100 WBC (ref 0–0.2)
PLATELET # BLD AUTO: 266 K/UL (ref 164–446)
PMV BLD AUTO: 9.1 FL (ref 9–12.9)
POTASSIUM SERPL-SCNC: 4 MMOL/L (ref 3.6–5.5)
PROT SERPL-MCNC: 7.8 G/DL (ref 6–8.2)
RBC # BLD AUTO: 5 M/UL (ref 4.2–5.4)
SODIUM SERPL-SCNC: 139 MMOL/L (ref 135–145)
TRIGL SERPL-MCNC: 223 MG/DL (ref 0–149)
VIT B12 SERPL-MCNC: 1140 PG/ML (ref 211–911)
WBC # BLD AUTO: 9.8 K/UL (ref 4.8–10.8)

## 2024-10-07 PROCEDURE — 82570 ASSAY OF URINE CREATININE: CPT

## 2024-10-07 PROCEDURE — 83036 HEMOGLOBIN GLYCOSYLATED A1C: CPT

## 2024-10-07 PROCEDURE — 80053 COMPREHEN METABOLIC PANEL: CPT

## 2024-10-07 PROCEDURE — 82043 UR ALBUMIN QUANTITATIVE: CPT

## 2024-10-07 PROCEDURE — 82306 VITAMIN D 25 HYDROXY: CPT

## 2024-10-07 PROCEDURE — 82607 VITAMIN B-12: CPT

## 2024-10-07 PROCEDURE — 80061 LIPID PANEL: CPT

## 2024-10-07 PROCEDURE — 85025 COMPLETE CBC W/AUTO DIFF WBC: CPT

## 2024-10-07 PROCEDURE — 36415 COLL VENOUS BLD VENIPUNCTURE: CPT

## 2024-10-17 ENCOUNTER — OFFICE VISIT (OUTPATIENT)
Dept: MEDICAL GROUP | Facility: MEDICAL CENTER | Age: 49
End: 2024-10-17
Payer: COMMERCIAL

## 2024-10-17 VITALS
HEART RATE: 86 BPM | WEIGHT: 147.05 LBS | OXYGEN SATURATION: 98 % | BODY MASS INDEX: 24.5 KG/M2 | SYSTOLIC BLOOD PRESSURE: 110 MMHG | HEIGHT: 65 IN | TEMPERATURE: 97.5 F | DIASTOLIC BLOOD PRESSURE: 86 MMHG

## 2024-10-17 DIAGNOSIS — I10 PRIMARY HYPERTENSION: ICD-10-CM

## 2024-10-17 DIAGNOSIS — E11.69 DYSLIPIDEMIA ASSOCIATED WITH TYPE 2 DIABETES MELLITUS (HCC): ICD-10-CM

## 2024-10-17 DIAGNOSIS — Z23 NEED FOR VACCINATION: ICD-10-CM

## 2024-10-17 DIAGNOSIS — E11.69 TYPE 2 DIABETES MELLITUS WITH OTHER SPECIFIED COMPLICATION, WITHOUT LONG-TERM CURRENT USE OF INSULIN (HCC): ICD-10-CM

## 2024-10-17 DIAGNOSIS — E78.5 DYSLIPIDEMIA ASSOCIATED WITH TYPE 2 DIABETES MELLITUS (HCC): ICD-10-CM

## 2024-10-17 PROCEDURE — 3079F DIAST BP 80-89 MM HG: CPT | Performed by: FAMILY MEDICINE

## 2024-10-17 PROCEDURE — 90471 IMMUNIZATION ADMIN: CPT | Performed by: FAMILY MEDICINE

## 2024-10-17 PROCEDURE — 3074F SYST BP LT 130 MM HG: CPT | Performed by: FAMILY MEDICINE

## 2024-10-17 PROCEDURE — 90656 IIV3 VACC NO PRSV 0.5 ML IM: CPT | Performed by: FAMILY MEDICINE

## 2024-10-17 PROCEDURE — 99214 OFFICE O/P EST MOD 30 MIN: CPT | Mod: 25 | Performed by: FAMILY MEDICINE

## 2024-10-17 RX ORDER — PYRIDOXINE HCL (VITAMIN B6) 100 MG
TABLET ORAL
COMMUNITY

## 2024-10-17 ASSESSMENT — ENCOUNTER SYMPTOMS
FEVER: 0
PALPITATIONS: 0
CHILLS: 0

## 2024-10-17 ASSESSMENT — FIBROSIS 4 INDEX: FIB4 SCORE: 0.92

## 2024-10-22 ENCOUNTER — TELEPHONE (OUTPATIENT)
Dept: MEDICAL GROUP | Facility: MEDICAL CENTER | Age: 49
End: 2024-10-22
Payer: COMMERCIAL

## 2024-10-22 ENCOUNTER — PATIENT MESSAGE (OUTPATIENT)
Dept: MEDICAL GROUP | Facility: MEDICAL CENTER | Age: 49
End: 2024-10-22

## 2024-10-22 DIAGNOSIS — E11.69 TYPE 2 DIABETES MELLITUS WITH OTHER SPECIFIED COMPLICATION, WITHOUT LONG-TERM CURRENT USE OF INSULIN (HCC): ICD-10-CM

## 2024-10-22 NOTE — PROGRESS NOTES
Please submit prior authorization for Mounjaro.    Patient has type 2 diabetes mellitus, ICD code: Code: E11.69   Failed medications: Jardiance and Ozempic and not able to tolerate metformin.  Please let me know if you have any questions.  Thank you.

## 2024-11-22 ENCOUNTER — PATIENT MESSAGE (OUTPATIENT)
Dept: MEDICAL GROUP | Facility: MEDICAL CENTER | Age: 49
End: 2024-11-22
Payer: COMMERCIAL

## 2024-11-22 DIAGNOSIS — E11.69 TYPE 2 DIABETES MELLITUS WITH OTHER SPECIFIED COMPLICATION, WITHOUT LONG-TERM CURRENT USE OF INSULIN (HCC): ICD-10-CM

## 2024-11-25 RX ORDER — TIRZEPATIDE 5 MG/.5ML
5 INJECTION, SOLUTION SUBCUTANEOUS
Qty: 2 ML | Refills: 3 | Status: SHIPPED | OUTPATIENT
Start: 2024-11-25

## 2024-12-09 DIAGNOSIS — E11.69 DYSLIPIDEMIA ASSOCIATED WITH TYPE 2 DIABETES MELLITUS (HCC): ICD-10-CM

## 2024-12-09 DIAGNOSIS — I10 PRIMARY HYPERTENSION: ICD-10-CM

## 2024-12-09 DIAGNOSIS — E78.5 DYSLIPIDEMIA ASSOCIATED WITH TYPE 2 DIABETES MELLITUS (HCC): ICD-10-CM

## 2024-12-09 DIAGNOSIS — E11.69 TYPE 2 DIABETES MELLITUS WITH OTHER SPECIFIED COMPLICATION, WITHOUT LONG-TERM CURRENT USE OF INSULIN (HCC): ICD-10-CM

## 2024-12-10 RX ORDER — TIRZEPATIDE 5 MG/.5ML
5 INJECTION, SOLUTION SUBCUTANEOUS
Qty: 6 ML | Refills: 3 | Status: SHIPPED | OUTPATIENT
Start: 2024-12-10

## 2024-12-10 RX ORDER — ROSUVASTATIN CALCIUM 5 MG/1
5 TABLET, COATED ORAL EVERY EVENING
Qty: 90 TABLET | Refills: 3 | Status: SHIPPED | OUTPATIENT
Start: 2024-12-10

## 2024-12-10 RX ORDER — LOSARTAN POTASSIUM AND HYDROCHLOROTHIAZIDE 12.5; 1 MG/1; MG/1
1 TABLET ORAL
Qty: 90 TABLET | Refills: 3 | Status: SHIPPED | OUTPATIENT
Start: 2024-12-10

## 2024-12-10 NOTE — TELEPHONE ENCOUNTER
Patient comment: Pls send a request for higher dose per Dr. Romeo. It normally taken a while for the pharmacy to received it. Thank you

## 2025-01-06 ENCOUNTER — HOSPITAL ENCOUNTER (OUTPATIENT)
Dept: LAB | Facility: MEDICAL CENTER | Age: 50
End: 2025-01-06
Attending: FAMILY MEDICINE
Payer: COMMERCIAL

## 2025-01-06 DIAGNOSIS — E11.69 TYPE 2 DIABETES MELLITUS WITH OTHER SPECIFIED COMPLICATION, WITHOUT LONG-TERM CURRENT USE OF INSULIN (HCC): ICD-10-CM

## 2025-01-06 DIAGNOSIS — E11.69 DYSLIPIDEMIA ASSOCIATED WITH TYPE 2 DIABETES MELLITUS (HCC): ICD-10-CM

## 2025-01-06 DIAGNOSIS — E78.5 DYSLIPIDEMIA ASSOCIATED WITH TYPE 2 DIABETES MELLITUS (HCC): ICD-10-CM

## 2025-01-06 LAB
ALBUMIN SERPL BCP-MCNC: 4.1 G/DL (ref 3.2–4.9)
ALBUMIN/GLOB SERPL: 1.3 G/DL
ALP SERPL-CCNC: 72 U/L (ref 30–99)
ALT SERPL-CCNC: 74 U/L (ref 2–50)
ANION GAP SERPL CALC-SCNC: 13 MMOL/L (ref 7–16)
AST SERPL-CCNC: 28 U/L (ref 12–45)
BILIRUB SERPL-MCNC: 0.3 MG/DL (ref 0.1–1.5)
BUN SERPL-MCNC: 17 MG/DL (ref 8–22)
CALCIUM ALBUM COR SERPL-MCNC: 9.2 MG/DL (ref 8.5–10.5)
CALCIUM SERPL-MCNC: 9.3 MG/DL (ref 8.5–10.5)
CHLORIDE SERPL-SCNC: 103 MMOL/L (ref 96–112)
CHOLEST SERPL-MCNC: 59 MG/DL (ref 100–199)
CO2 SERPL-SCNC: 24 MMOL/L (ref 20–33)
CREAT SERPL-MCNC: 0.44 MG/DL (ref 0.5–1.4)
EST. AVERAGE GLUCOSE BLD GHB EST-MCNC: 123 MG/DL
GFR SERPLBLD CREATININE-BSD FMLA CKD-EPI: 119 ML/MIN/1.73 M 2
GLOBULIN SER CALC-MCNC: 3.2 G/DL (ref 1.9–3.5)
GLUCOSE SERPL-MCNC: 114 MG/DL (ref 65–99)
HBA1C MFR BLD: 5.9 % (ref 4–5.6)
HDLC SERPL-MCNC: 24 MG/DL
LDLC SERPL CALC-MCNC: 3 MG/DL
POTASSIUM SERPL-SCNC: 3.8 MMOL/L (ref 3.6–5.5)
PROT SERPL-MCNC: 7.3 G/DL (ref 6–8.2)
SODIUM SERPL-SCNC: 140 MMOL/L (ref 135–145)
TRIGL SERPL-MCNC: 161 MG/DL (ref 0–149)

## 2025-01-06 PROCEDURE — 83036 HEMOGLOBIN GLYCOSYLATED A1C: CPT

## 2025-01-06 PROCEDURE — 80053 COMPREHEN METABOLIC PANEL: CPT

## 2025-01-06 PROCEDURE — 36415 COLL VENOUS BLD VENIPUNCTURE: CPT

## 2025-01-06 PROCEDURE — 80061 LIPID PANEL: CPT

## 2025-01-15 ENCOUNTER — OFFICE VISIT (OUTPATIENT)
Dept: MEDICAL GROUP | Facility: MEDICAL CENTER | Age: 50
End: 2025-01-15
Payer: COMMERCIAL

## 2025-01-15 VITALS
WEIGHT: 138.01 LBS | TEMPERATURE: 97.6 F | BODY MASS INDEX: 22.99 KG/M2 | SYSTOLIC BLOOD PRESSURE: 118 MMHG | OXYGEN SATURATION: 98 % | HEART RATE: 78 BPM | HEIGHT: 65 IN | DIASTOLIC BLOOD PRESSURE: 86 MMHG

## 2025-01-15 DIAGNOSIS — E55.9 VITAMIN D DEFICIENCY: ICD-10-CM

## 2025-01-15 DIAGNOSIS — E78.5 DYSLIPIDEMIA ASSOCIATED WITH TYPE 2 DIABETES MELLITUS (HCC): ICD-10-CM

## 2025-01-15 DIAGNOSIS — E11.69 DYSLIPIDEMIA ASSOCIATED WITH TYPE 2 DIABETES MELLITUS (HCC): ICD-10-CM

## 2025-01-15 DIAGNOSIS — E11.69 TYPE 2 DIABETES MELLITUS WITH OTHER SPECIFIED COMPLICATION, WITHOUT LONG-TERM CURRENT USE OF INSULIN (HCC): ICD-10-CM

## 2025-01-15 PROCEDURE — 3074F SYST BP LT 130 MM HG: CPT | Performed by: FAMILY MEDICINE

## 2025-01-15 PROCEDURE — 3079F DIAST BP 80-89 MM HG: CPT | Performed by: FAMILY MEDICINE

## 2025-01-15 PROCEDURE — 99214 OFFICE O/P EST MOD 30 MIN: CPT | Performed by: FAMILY MEDICINE

## 2025-01-15 ASSESSMENT — PATIENT HEALTH QUESTIONNAIRE - PHQ9: CLINICAL INTERPRETATION OF PHQ2 SCORE: 0

## 2025-01-15 ASSESSMENT — ENCOUNTER SYMPTOMS
FEVER: 0
PALPITATIONS: 0
CHILLS: 0

## 2025-01-15 ASSESSMENT — FIBROSIS 4 INDEX: FIB4 SCORE: 0.59

## 2025-01-15 NOTE — LETTER
[facility]  Eleuterio Romeo M.D.  [address]  [place] [address]  Fax: [phone]   Authorization for Release/Disclosure of   Protected Health Information   Name: ITA JOHNSON : 1975 SSN: [id number]   Address: [facility] [address] [place] [address] Phone:    [phone] (home)    I authorize the entity listed below to release/disclose the PHI below to:   [facility]/Eleuterio Romeo M.D. and Eleuterio Romeo M.D.   Provider or Entity Name:     Address   City, State, Zip   Phone:      Fax:     Reason for request: continuity of care   Information to be released:    [  ] LAST COLONOSCOPY,  including any PATH REPORT and follow-up  [  ] LAST FIT/COLOGUARD RESULT [  ] LAST DEXA  [  ] LAST MAMMOGRAM  [  ] LAST PAP  [  ] LAST LABS [  ] RETINA EXAM REPORT  [  ] IMMUNIZATION RECORDS  [  ] Release all info      [  ] Check here and initial the line next to each item to release ALL health information INCLUDING  _____ Care and treatment for drug and / or alcohol abuse  _____ HIV testing, infection status, or AIDS  _____ Genetic Testing    DATES OF SERVICE OR TIME PERIOD TO BE DISCLOSED: _____________  I understand and acknowledge that:  * This Authorization may be revoked at any time by you in writing, except if your health information has already been used or disclosed.  * Your health information that will be used or disclosed as a result of you signing this authorization could be re-disclosed by the recipient. If this occurs, your re-disclosed health information may no longer be protected by State or Federal laws.  * You may refuse to sign this Authorization. Your refusal will not affect your ability to obtain treatment.  * This Authorization becomes effective upon signing and will  on (date) __________.      If no date is indicated, this Authorization will  one (1) year from the signature date.    Name: Ita Johnson  Signature: Date:   1/15/2025     PLEASE FAX  REQUESTED RECORDS BACK TO: [phone]

## 2025-01-15 NOTE — PROGRESS NOTES
Verbal consent was acquired by the patient to use [employer] ambient listening note generation during this visit.      Ita was seen today for follow-up.    Diagnoses and all orders for this visit:    Dyslipidemia associated with type 2 diabetes mellitus (HCC)  -     Comp Metabolic Panel; Future  -     Lipid Profile; Future    Type 2 diabetes mellitus with other specified complication, without long-term current use of insulin (HCC)  -     HEMOGLOBIN A1C; Future  -     MICROALBUMIN CREAT RATIO URINE; Future  -     VITAMIN B12; Future  -     Tirzepatide (MOUNJARO) 7.5 MG/0.5ML Solution Auto-injector; Inject 7.5 mg under the skin every 7 days.    Vitamin D deficiency  -     VITAMIN D,25 HYDROXY (DEFICIENCY); Future                  Assessment & Plan  1.  Dyslipidemia  Chronic condition, overcontrolled  - LDL levels significantly low (reading of 3)  - Discontinue current cholesterol medication temporarily to monitor response    2.  Elevated liver function test  New condition, unstable  - Slight elevation in liver function test (74)  - Upcoming appointment with gastroenterologist in March 2025 to discuss about possible hepatitis B infection  - Repeat blood test in 3 months to reassess liver function  - Further tests (liver ultrasound or additional hepatitis B testing) may be considered based on results    3.  Diabetes  Chronic condition, A1c mildly increased, please Mounjaro 7.5 mg every 7 days.  - Completed eye exam  - Records to be requested from [facility]'s office    4.  Vitamin D deficiency  Chronic condition, unstable based on last labs, recheck labs to assess this further.    For dizziness, recommend patient to monitor blood pressure    Follow-up in 3 to 4 months for lab follow-up.    Chief complaint::Diagnoses of Dyslipidemia associated with type 2 diabetes mellitus (HCC), Type 2 diabetes mellitus with other specified complication, without long-term current use of insulin (HCC), and Vitamin D deficiency were  "pertinent to this visit.      History of Present Illness  The patient is a 49-year-old female who presents for a blood test follow-up.      Hepatitis B Infection  - Scheduled appointment with gastroenterologist in March 2025    Ophthalmological Examination  - Recently undergone an examination at [place]'s office    Experiences dizziness sometimes when she gets from sitting position, could be orthostatic hypotension.    Review of Systems   Constitutional:  Negative for chills and fever.   Cardiovascular:  Negative for chest pain, palpitations and leg swelling.          Medications and Allergies:     Current Outpatient Medications   Medication Sig Dispense Refill    Tirzepatide (MOUNJARO) 7.5 MG/0.5ML Solution Auto-injector Inject 7.5 mg under the skin every 7 days. 6 mL 3    losartan-hydrochlorothiazide (HYZAAR) 100-12.5 MG per tablet Take 1 Tablet by mouth every day. 90 Tablet 3    Ferrous Fumarate (IRON) 18 MG Tab CR Take  by mouth.      Omega-3 Fatty Acids (FISH OIL PO) Take 2 Capsules by mouth every day.      Ascorbic Acid (VITAMIN C PO) Take 1 Tablet by mouth every day.      BIOTIN PO Take 1 Tablet by mouth every day.       No current facility-administered medications for this visit.       /86 (BP Location: Left arm, Patient Position: Sitting, BP Cuff Size: Adult)   Pulse 78   Temp 36.4 °C (97.6 °F) (Temporal)   Ht 1.651 m (5' 5\")   Wt 62.6 kg (138 lb 0.1 oz)   SpO2 98% , Body mass index is 22.97 kg/m².      Physical Exam  Constitutional:       Appearance: Normal appearance. She is well-developed and well-groomed.   HENT:      Head: Normocephalic and atraumatic.      Right Ear: External ear normal.      Left Ear: External ear normal.   Eyes:      General:         Right eye: No discharge.         Left eye: No discharge.      Conjunctiva/sclera: Conjunctivae normal.   Cardiovascular:      Rate and Rhythm: Normal rate.   Pulmonary:      Effort: Pulmonary effort is normal. No respiratory distress. "   Musculoskeletal:      Cervical back: Neck supple.   Skin:     Findings: No rash.   Neurological:      Mental Status: She is alert.   Psychiatric:         Mood and Affect: Mood and affect normal.         Behavior: Behavior normal.            I reviewed with patient lab results resulted on 1/6/2025        Please note that this dictation was created using voice recognition software. I have made every reasonable attempt to correct obvious errors, but I expect that there are errors of grammar and possibly content that I did not discover before finalizing the note.

## 2025-02-13 NOTE — ASSESSMENT & PLAN NOTE
Chronic problem, stable, discontinue metformin.  Discussed with patient that Ozempic is a GLP-1 agonist and pancreatitis is side effect of this medication.  She would like to try Ozempic.  She is aware that pancreatitis is side effect of this medication.  Discussed if she start experiencing any symptoms to stop this medication immediately.  We will try a small dose of medication which is 0.25 mg every 7 days   Detail Level: Zone Plan: Advised to moisturize lesion daily.

## 2025-04-14 ENCOUNTER — HOSPITAL ENCOUNTER (OUTPATIENT)
Dept: LAB | Facility: MEDICAL CENTER | Age: 50
End: 2025-04-14
Payer: COMMERCIAL

## 2025-04-14 ENCOUNTER — HOSPITAL ENCOUNTER (OUTPATIENT)
Dept: LAB | Facility: MEDICAL CENTER | Age: 50
End: 2025-04-14
Attending: FAMILY MEDICINE
Payer: COMMERCIAL

## 2025-04-14 ENCOUNTER — RESULTS FOLLOW-UP (OUTPATIENT)
Dept: MEDICAL GROUP | Facility: MEDICAL CENTER | Age: 50
End: 2025-04-14

## 2025-04-14 DIAGNOSIS — E11.69 TYPE 2 DIABETES MELLITUS WITH OTHER SPECIFIED COMPLICATION, WITHOUT LONG-TERM CURRENT USE OF INSULIN (HCC): ICD-10-CM

## 2025-04-14 DIAGNOSIS — E11.69 DYSLIPIDEMIA ASSOCIATED WITH TYPE 2 DIABETES MELLITUS (HCC): ICD-10-CM

## 2025-04-14 DIAGNOSIS — E55.9 VITAMIN D DEFICIENCY: ICD-10-CM

## 2025-04-14 DIAGNOSIS — E78.5 DYSLIPIDEMIA ASSOCIATED WITH TYPE 2 DIABETES MELLITUS (HCC): ICD-10-CM

## 2025-04-14 LAB
BASOPHILS # BLD AUTO: 0.5 % (ref 0–1.8)
BASOPHILS # BLD: 0.04 K/UL (ref 0–0.12)
EOSINOPHIL # BLD AUTO: 0.26 K/UL (ref 0–0.51)
EOSINOPHIL NFR BLD: 3.3 % (ref 0–6.9)
ERYTHROCYTE [DISTWIDTH] IN BLOOD BY AUTOMATED COUNT: 50.4 FL (ref 35.9–50)
EST. AVERAGE GLUCOSE BLD GHB EST-MCNC: 126 MG/DL
HBA1C MFR BLD: 6 % (ref 4–5.6)
HBV SURFACE AB SERPL IA-ACNC: <3.5 MIU/ML (ref 0–10)
HBV SURFACE AG SER QL: REACTIVE
HCT VFR BLD AUTO: 46.4 % (ref 37–47)
HGB BLD-MCNC: 15.6 G/DL (ref 12–16)
IMM GRANULOCYTES # BLD AUTO: 0.02 K/UL (ref 0–0.11)
IMM GRANULOCYTES NFR BLD AUTO: 0.3 % (ref 0–0.9)
LYMPHOCYTES # BLD AUTO: 2.52 K/UL (ref 1–4.8)
LYMPHOCYTES NFR BLD: 31.6 % (ref 22–41)
MCH RBC QN AUTO: 31.3 PG (ref 27–33)
MCHC RBC AUTO-ENTMCNC: 33.6 G/DL (ref 32.2–35.5)
MCV RBC AUTO: 93.2 FL (ref 81.4–97.8)
MONOCYTES # BLD AUTO: 0.62 K/UL (ref 0–0.85)
MONOCYTES NFR BLD AUTO: 7.8 % (ref 0–13.4)
NEUTROPHILS # BLD AUTO: 4.51 K/UL (ref 1.82–7.42)
NEUTROPHILS NFR BLD: 56.5 % (ref 44–72)
NRBC # BLD AUTO: 0 K/UL
NRBC BLD-RTO: 0 /100 WBC (ref 0–0.2)
PLATELET # BLD AUTO: 292 K/UL (ref 164–446)
PMV BLD AUTO: 9.1 FL (ref 9–12.9)
RBC # BLD AUTO: 4.98 M/UL (ref 4.2–5.4)
WBC # BLD AUTO: 8 K/UL (ref 4.8–10.8)

## 2025-04-14 PROCEDURE — 87340 HEPATITIS B SURFACE AG IA: CPT

## 2025-04-14 PROCEDURE — 82105 ALPHA-FETOPROTEIN SERUM: CPT

## 2025-04-14 PROCEDURE — 82570 ASSAY OF URINE CREATININE: CPT

## 2025-04-14 PROCEDURE — 87517 HEPATITIS B DNA QUANT: CPT

## 2025-04-14 PROCEDURE — 82306 VITAMIN D 25 HYDROXY: CPT

## 2025-04-14 PROCEDURE — 87341 HEP B SURFACE AG NEUTRLZJ IA: CPT

## 2025-04-14 PROCEDURE — 36415 COLL VENOUS BLD VENIPUNCTURE: CPT

## 2025-04-14 PROCEDURE — 86706 HEP B SURFACE ANTIBODY: CPT

## 2025-04-14 PROCEDURE — 80053 COMPREHEN METABOLIC PANEL: CPT

## 2025-04-14 PROCEDURE — 82043 UR ALBUMIN QUANTITATIVE: CPT

## 2025-04-14 PROCEDURE — 83036 HEMOGLOBIN GLYCOSYLATED A1C: CPT

## 2025-04-14 PROCEDURE — 82607 VITAMIN B-12: CPT

## 2025-04-14 PROCEDURE — 80061 LIPID PANEL: CPT

## 2025-04-14 PROCEDURE — 85025 COMPLETE CBC W/AUTO DIFF WBC: CPT

## 2025-04-14 PROCEDURE — 87350 HEPATITIS BE AG IA: CPT

## 2025-04-15 LAB
25(OH)D3 SERPL-MCNC: 31 NG/ML (ref 30–100)
AFP-TM SERPL-MCNC: 2 NG/ML (ref 0–9)
ALBUMIN SERPL BCP-MCNC: 4.1 G/DL (ref 3.2–4.9)
ALBUMIN/GLOB SERPL: 1.4 G/DL
ALP SERPL-CCNC: 64 U/L (ref 30–99)
ALT SERPL-CCNC: 34 U/L (ref 2–50)
ANION GAP SERPL CALC-SCNC: 12 MMOL/L (ref 7–16)
AST SERPL-CCNC: 22 U/L (ref 12–45)
BILIRUB SERPL-MCNC: 0.4 MG/DL (ref 0.1–1.5)
BUN SERPL-MCNC: 13 MG/DL (ref 8–22)
CALCIUM ALBUM COR SERPL-MCNC: 8.7 MG/DL (ref 8.5–10.5)
CALCIUM SERPL-MCNC: 8.8 MG/DL (ref 8.5–10.5)
CHLORIDE SERPL-SCNC: 104 MMOL/L (ref 96–112)
CHOLEST SERPL-MCNC: 103 MG/DL (ref 100–199)
CO2 SERPL-SCNC: 23 MMOL/L (ref 20–33)
CREAT SERPL-MCNC: 0.56 MG/DL (ref 0.5–1.4)
CREAT UR-MCNC: 61.8 MG/DL
FASTING STATUS PATIENT QL REPORTED: NORMAL
GFR SERPLBLD CREATININE-BSD FMLA CKD-EPI: 112 ML/MIN/1.73 M 2
GLOBULIN SER CALC-MCNC: 3 G/DL (ref 1.9–3.5)
GLUCOSE SERPL-MCNC: 99 MG/DL (ref 65–99)
HBV E AG SERPL QL IA: NEGATIVE
HDLC SERPL-MCNC: 28 MG/DL
LDLC SERPL CALC-MCNC: 46 MG/DL
MICROALBUMIN UR-MCNC: <1.2 MG/DL
MICROALBUMIN/CREAT UR: NORMAL MG/G (ref 0–30)
POTASSIUM SERPL-SCNC: 3.7 MMOL/L (ref 3.6–5.5)
PROT SERPL-MCNC: 7.1 G/DL (ref 6–8.2)
SODIUM SERPL-SCNC: 139 MMOL/L (ref 135–145)
TRIGL SERPL-MCNC: 145 MG/DL (ref 0–149)
VIT B12 SERPL-MCNC: 766 PG/ML (ref 211–911)

## 2025-04-16 LAB
HBV DNA SERPL NAA+PROBE-ACNC: ABNORMAL IU/ML
HBV DNA SERPL NAA+PROBE-LOG IU: 4.77 LOG IU/ML
HBV DNA SERPL QL NAA+PROBE: DETECTED

## 2025-04-18 LAB — HBV SURFACE AG SERPL QL NT: POSITIVE

## 2025-04-29 ENCOUNTER — APPOINTMENT (OUTPATIENT)
Dept: MEDICAL GROUP | Facility: MEDICAL CENTER | Age: 50
End: 2025-04-29
Payer: COMMERCIAL

## 2025-05-06 ENCOUNTER — APPOINTMENT (OUTPATIENT)
Dept: MEDICAL GROUP | Facility: MEDICAL CENTER | Age: 50
End: 2025-05-06
Payer: COMMERCIAL

## 2025-05-06 VITALS
HEART RATE: 76 BPM | DIASTOLIC BLOOD PRESSURE: 86 MMHG | SYSTOLIC BLOOD PRESSURE: 114 MMHG | TEMPERATURE: 98.2 F | WEIGHT: 141.09 LBS | OXYGEN SATURATION: 98 % | HEIGHT: 64 IN | BODY MASS INDEX: 24.09 KG/M2

## 2025-05-06 DIAGNOSIS — E11.69 TYPE 2 DIABETES MELLITUS WITH OTHER SPECIFIED COMPLICATION, WITHOUT LONG-TERM CURRENT USE OF INSULIN (HCC): ICD-10-CM

## 2025-05-06 DIAGNOSIS — E55.9 VITAMIN D DEFICIENCY: ICD-10-CM

## 2025-05-06 DIAGNOSIS — R92.30 DENSE BREAST: ICD-10-CM

## 2025-05-06 DIAGNOSIS — Z12.31 ENCOUNTER FOR SCREENING MAMMOGRAM FOR BREAST CANCER: ICD-10-CM

## 2025-05-06 DIAGNOSIS — Z12.39 ENCOUNTER FOR BREAST CANCER SCREENING USING NON-MAMMOGRAM MODALITY: ICD-10-CM

## 2025-05-06 PROCEDURE — 99214 OFFICE O/P EST MOD 30 MIN: CPT | Performed by: FAMILY MEDICINE

## 2025-05-06 PROCEDURE — 3079F DIAST BP 80-89 MM HG: CPT | Performed by: FAMILY MEDICINE

## 2025-05-06 PROCEDURE — 3074F SYST BP LT 130 MM HG: CPT | Performed by: FAMILY MEDICINE

## 2025-05-06 RX ORDER — MULTIVIT-MIN/IRON/FOLIC ACID/K 18-600-40
CAPSULE ORAL
COMMUNITY

## 2025-05-06 RX ORDER — TIRZEPATIDE 10 MG/.5ML
10 INJECTION, SOLUTION SUBCUTANEOUS
Qty: 6 ML | Refills: 3 | Status: SHIPPED | OUTPATIENT
Start: 2025-05-06

## 2025-05-06 ASSESSMENT — ENCOUNTER SYMPTOMS
CHILLS: 0
FEVER: 0
PALPITATIONS: 0

## 2025-05-06 ASSESSMENT — FIBROSIS 4 INDEX: FIB4 SCORE: 0.62

## 2025-05-06 NOTE — PROGRESS NOTES
Verbal consent was acquired by the patient to use [employer] ambient listening note generation during this visit.      Ita was seen today for follow-up.    Diagnoses and all orders for this visit:    Vitamin D deficiency  -     VITAMIN D,25 HYDROXY (DEFICIENCY); Future    Type 2 diabetes mellitus with other specified complication, without long-term current use of insulin (HCC)  -     Comp Metabolic Panel; Future  -     HEMOGLOBIN A1C; Future  -     Tirzepatide (MOUNJARO) 10 MG/0.5ML Solution Auto-injector; Inject 10 mg under the skin every 7 days.    Encounter for screening mammogram for breast cancer  -     MA-SCREENING MAMMO BILAT W/TOMOSYNTHESIS W/CAD; Future    Encounter for breast cancer screening using non-mammogram modality  -     US-SCREENING WHOLE BREAST BILATERAL (3D SCREENING); Future    Dense breast  -     US-SCREENING WHOLE BREAST BILATERAL (3D SCREENING); Future                  Assessment & Plan  1.  Vitamin D deficiency  Chronic condition, unstable, recommended to take double the amount of vitamin D she is taking.  Recheck labs with next blood test.      2.  Type 2 diabetes  Chronic condition, stable  - A1c increased to 6.0 from previous numbers.  - Dosage of Mounjaro increased to 10 mg, to be taken every 7 days.  - Prescription for a 3-month supply of Mounjaro provided.  - Request eye exams from [facility].      Send follow-up in 4 months for lab follow-up          Chief complaint::Diagnoses of Vitamin D deficiency, Type 2 diabetes mellitus with other specified complication, without long-term current use of insulin (HCC), Encounter for screening mammogram for breast cancer, Encounter for breast cancer screening using non-mammogram modality, and Dense breast were pertinent to this visit.      History of Present Illness  The patient is a 49-year-old female who presents for a follow-up on her medications and blood test results.    Eye Examination  - She has expressed interest in undergoing an  eye examination.    Mammogram  - She has been advised to undergo a mammogram due to dense breast tissue.  - An optional ultrasound is recommended for better imaging.    Blood Test Results  - Recent blood test results show normal levels of vitamin B12 and cholesterol.  - Improvements in total cholesterol, LDL, and triglycerides.  - Vitamin D levels are slightly low.  - Her A1c has increased to 6.0 from previous numbers.    Medication Regimen  - Her current medication regimen includes Mounjaro.  - Mounjaro appears to be stimulating her appetite, leading to increased food intake and subsequent weight gain.  - She is considering escalating the dosage of Mounjaro to 10 mg.  - She is contemplating obtaining a 3-month supply of this medication due to her upcoming travel plans to the [facility].    Liver Ultrasound  - She has been advised to undergo a liver ultrasound as part of her follow-up care.    Supplemental information: None.      Review of Systems   Constitutional:  Negative for chills and fever.   Cardiovascular:  Negative for chest pain, palpitations and leg swelling.          Medications and Allergies:     Current Outpatient Medications   Medication Sig Dispense Refill    Cholecalciferol (VITAMIN D) 50 MCG (2000 UT) Cap Take  by mouth.      Multiple Vitamins-Minerals (ZINC PO) Take  by mouth.      Tirzepatide (MOUNJARO) 10 MG/0.5ML Solution Auto-injector Inject 10 mg under the skin every 7 days. 6 mL 3    losartan-hydrochlorothiazide (HYZAAR) 100-12.5 MG per tablet Take 1 Tablet by mouth every day. 90 Tablet 3    Ferrous Fumarate (IRON) 18 MG Tab CR Take  by mouth.      Omega-3 Fatty Acids (FISH OIL PO) Take 2 Capsules by mouth every day.      Ascorbic Acid (VITAMIN C PO) Take 1 Tablet by mouth every day.      BIOTIN PO Take 1 Tablet by mouth every day.       No current facility-administered medications for this visit.       /86 (BP Location: Left arm, Patient Position: Sitting, BP Cuff Size: Adult)    "Pulse 76   Temp 36.8 °C (98.2 °F) (Temporal)   Ht 1.626 m (5' 4\")   Wt 64 kg (141 lb 1.5 oz)   SpO2 98% , Body mass index is 24.22 kg/m².      Physical Exam  Constitutional:       Appearance: Normal appearance. She is well-developed and well-groomed.   HENT:      Head: Normocephalic and atraumatic.      Right Ear: External ear normal.      Left Ear: External ear normal.   Eyes:      General:         Right eye: No discharge.         Left eye: No discharge.      Conjunctiva/sclera: Conjunctivae normal.   Cardiovascular:      Rate and Rhythm: Normal rate.   Pulmonary:      Effort: Pulmonary effort is normal. No respiratory distress.   Musculoskeletal:      Cervical back: Neck supple.   Skin:     Findings: No rash.   Neurological:      Mental Status: She is alert.   Psychiatric:         Mood and Affect: Mood and affect normal.         Behavior: Behavior normal.            I reviewed with patient lab results resulted on 4/14/2025.        Please note that this dictation was created using voice recognition software. I have made every reasonable attempt to correct obvious errors, but I expect that there are errors of grammar and possibly content that I did not discover before finalizing the note.      "

## 2025-05-06 NOTE — LETTER
[place]  Eleuterio Romeo M.D.  [address]  [place] [address]  Fax: [phone]   Authorization for Release/Disclosure of   Protected Health Information   Name: ITA JOHNSON : 1975 SSN: [id number]   Address: [facility] [address] [place] [address] Phone:    There are no phone numbers on file.   I authorize the entity listed below to release/disclose the PHI below to:   [facility]/Eleuterio Romeo M.D. and Eleuterio Romeo M.D.   Provider or Entity Name:     Address   City, State, Zip   Phone:      Fax:     Reason for request: continuity of care   Information to be released:    [  ] LAST COLONOSCOPY,  including any PATH REPORT and follow-up  [  ] LAST FIT/COLOGUARD RESULT [  ] LAST DEXA  [  ] LAST MAMMOGRAM  [  ] LAST PAP  [  ] LAST LABS [  ] RETINA EXAM REPORT  [  ] IMMUNIZATION RECORDS  [  ] Release all info      [  ] Check here and initial the line next to each item to release ALL health information INCLUDING  _____ Care and treatment for drug and / or alcohol abuse  _____ HIV testing, infection status, or AIDS  _____ Genetic Testing    DATES OF SERVICE OR TIME PERIOD TO BE DISCLOSED: _____________  I understand and acknowledge that:  * This Authorization may be revoked at any time by you in writing, except if your health information has already been used or disclosed.  * Your health information that will be used or disclosed as a result of you signing this authorization could be re-disclosed by the recipient. If this occurs, your re-disclosed health information may no longer be protected by State or Federal laws.  * You may refuse to sign this Authorization. Your refusal will not affect your ability to obtain treatment.  * This Authorization becomes effective upon signing and will  on (date) __________.      If no date is indicated, this Authorization will  one (1) year from the signature date.    Name: Ita Johnson  Signature: Date:   2025      PLEASE FAX REQUESTED RECORDS BACK TO: [phone]

## 2025-07-07 ENCOUNTER — APPOINTMENT (OUTPATIENT)
Dept: RADIOLOGY | Facility: MEDICAL CENTER | Age: 50
End: 2025-07-07
Attending: FAMILY MEDICINE
Payer: COMMERCIAL

## 2025-08-28 ENCOUNTER — TELEPHONE (OUTPATIENT)
Dept: MEDICAL GROUP | Facility: MEDICAL CENTER | Age: 50
End: 2025-08-28
Payer: COMMERCIAL

## 2025-08-28 DIAGNOSIS — E11.69 TYPE 2 DIABETES MELLITUS WITH OTHER SPECIFIED COMPLICATION, WITHOUT LONG-TERM CURRENT USE OF INSULIN (HCC): Primary | ICD-10-CM

## 2025-09-08 ENCOUNTER — APPOINTMENT (OUTPATIENT)
Dept: RADIOLOGY | Facility: MEDICAL CENTER | Age: 50
End: 2025-09-08
Attending: FAMILY MEDICINE
Payer: COMMERCIAL

## 2025-09-08 DIAGNOSIS — Z12.31 VISIT FOR SCREENING MAMMOGRAM: ICD-10-CM
